# Patient Record
Sex: MALE | Race: WHITE | NOT HISPANIC OR LATINO | Employment: FULL TIME | URBAN - METROPOLITAN AREA
[De-identification: names, ages, dates, MRNs, and addresses within clinical notes are randomized per-mention and may not be internally consistent; named-entity substitution may affect disease eponyms.]

---

## 2023-06-03 DIAGNOSIS — E55.9 VITAMIN D DEFICIENCY: Primary | ICD-10-CM

## 2023-06-08 ENCOUNTER — APPOINTMENT (OUTPATIENT)
Dept: PRIMARY CARE | Facility: CLINIC | Age: 53
End: 2023-06-08
Payer: COMMERCIAL

## 2023-06-08 ENCOUNTER — TELEPHONE (OUTPATIENT)
Dept: PRIMARY CARE | Facility: CLINIC | Age: 53
End: 2023-06-08

## 2023-06-08 NOTE — TELEPHONE ENCOUNTER
Patient called to reschedule appointment stated he was admitted into [facility] this morning for his gallbladder. Just wanted to let you know.

## 2023-06-28 ENCOUNTER — APPOINTMENT (OUTPATIENT)
Dept: PRIMARY CARE | Facility: CLINIC | Age: 53
End: 2023-06-28
Payer: COMMERCIAL

## 2023-07-05 ENCOUNTER — OFFICE VISIT (OUTPATIENT)
Dept: PRIMARY CARE | Facility: CLINIC | Age: 53
End: 2023-07-05
Payer: COMMERCIAL

## 2023-07-05 VITALS
BODY MASS INDEX: 33.55 KG/M2 | HEART RATE: 114 BPM | DIASTOLIC BLOOD PRESSURE: 70 MMHG | SYSTOLIC BLOOD PRESSURE: 115 MMHG | WEIGHT: 211 LBS

## 2023-07-05 DIAGNOSIS — E11.43 DIABETIC AUTONOMIC NEUROPATHY ASSOCIATED WITH TYPE 2 DIABETES MELLITUS (MULTI): ICD-10-CM

## 2023-07-05 DIAGNOSIS — E29.1 HYPOGONADISM IN MALE: ICD-10-CM

## 2023-07-05 DIAGNOSIS — E11.69 TYPE 2 DIABETES MELLITUS WITH OTHER SPECIFIED COMPLICATION, WITHOUT LONG-TERM CURRENT USE OF INSULIN (MULTI): Primary | ICD-10-CM

## 2023-07-05 DIAGNOSIS — Z95.1 S/P CABG (CORONARY ARTERY BYPASS GRAFT): ICD-10-CM

## 2023-07-05 DIAGNOSIS — I50.42 CHRONIC COMBINED SYSTOLIC AND DIASTOLIC CONGESTIVE HEART FAILURE (MULTI): ICD-10-CM

## 2023-07-05 DIAGNOSIS — I10 BENIGN HYPERTENSION: ICD-10-CM

## 2023-07-05 DIAGNOSIS — Z12.11 SCREENING FOR MALIGNANT NEOPLASM OF COLON: ICD-10-CM

## 2023-07-05 DIAGNOSIS — I25.810 CORONARY ARTERY DISEASE INVOLVING AUTOLOGOUS VEIN CORONARY BYPASS GRAFT WITHOUT ANGINA PECTORIS: ICD-10-CM

## 2023-07-05 DIAGNOSIS — K21.00 GASTROESOPHAGEAL REFLUX DISEASE WITH ESOPHAGITIS WITHOUT HEMORRHAGE: ICD-10-CM

## 2023-07-05 PROBLEM — E55.9 VITAMIN D DEFICIENCY: Status: ACTIVE | Noted: 2023-07-05

## 2023-07-05 PROBLEM — I42.9 CARDIOMYOPATHY (MULTI): Status: ACTIVE | Noted: 2023-07-05

## 2023-07-05 PROBLEM — E04.9 GOITER: Status: ACTIVE | Noted: 2023-07-05

## 2023-07-05 PROBLEM — G47.19 EXCESSIVE DAYTIME SLEEPINESS: Status: ACTIVE | Noted: 2023-07-05

## 2023-07-05 PROBLEM — I44.7 LBBB (LEFT BUNDLE BRANCH BLOCK): Status: ACTIVE | Noted: 2023-07-05

## 2023-07-05 PROBLEM — E11.9 DIABETES MELLITUS (MULTI): Status: RESOLVED | Noted: 2023-07-05 | Resolved: 2023-07-05

## 2023-07-05 PROBLEM — R91.1 LUNG NODULE: Status: ACTIVE | Noted: 2023-07-05

## 2023-07-05 PROBLEM — N52.9 ERECTILE DYSFUNCTION: Status: ACTIVE | Noted: 2023-07-05

## 2023-07-05 PROBLEM — M17.12 PRIMARY OSTEOARTHRITIS OF LEFT KNEE: Status: ACTIVE | Noted: 2023-07-05

## 2023-07-05 PROBLEM — E11.9 DIABETES MELLITUS (MULTI): Status: ACTIVE | Noted: 2023-07-05

## 2023-07-05 PROCEDURE — 99212 OFFICE O/P EST SF 10 MIN: CPT | Performed by: NURSE PRACTITIONER

## 2023-07-05 PROCEDURE — 1036F TOBACCO NON-USER: CPT | Performed by: NURSE PRACTITIONER

## 2023-07-05 PROCEDURE — 3074F SYST BP LT 130 MM HG: CPT | Performed by: NURSE PRACTITIONER

## 2023-07-05 PROCEDURE — 3078F DIAST BP <80 MM HG: CPT | Performed by: NURSE PRACTITIONER

## 2023-07-05 PROCEDURE — 4010F ACE/ARB THERAPY RXD/TAKEN: CPT | Performed by: NURSE PRACTITIONER

## 2023-07-05 RX ORDER — EMPAGLIFLOZIN 25 MG/1
1 TABLET, FILM COATED ORAL DAILY
COMMUNITY
Start: 2020-11-10

## 2023-07-05 RX ORDER — ATORVASTATIN CALCIUM 40 MG/1
1 TABLET, FILM COATED ORAL DAILY
COMMUNITY
Start: 2017-12-22 | End: 2023-11-07 | Stop reason: SDUPTHER

## 2023-07-05 RX ORDER — ASPIRIN 81 MG/1
1 TABLET ORAL DAILY
COMMUNITY
Start: 2018-01-09

## 2023-07-05 RX ORDER — EZETIMIBE 10 MG/1
TABLET ORAL
COMMUNITY
Start: 2023-07-04

## 2023-07-05 RX ORDER — OMEPRAZOLE 20 MG/1
CAPSULE, DELAYED RELEASE ORAL
COMMUNITY
Start: 2020-11-10

## 2023-07-05 RX ORDER — LISINOPRIL 20 MG/1
1 TABLET ORAL DAILY
COMMUNITY
Start: 2018-01-09 | End: 2023-12-12

## 2023-07-05 RX ORDER — DULAGLUTIDE 3 MG/.5ML
INJECTION, SOLUTION SUBCUTANEOUS
COMMUNITY
Start: 2023-06-13 | End: 2024-04-19

## 2023-07-05 RX ORDER — CLOPIDOGREL BISULFATE 75 MG/1
1 TABLET ORAL DAILY
COMMUNITY
Start: 2018-01-09 | End: 2023-12-12

## 2023-07-05 RX ORDER — METFORMIN HYDROCHLORIDE 500 MG/1
2 TABLET ORAL
COMMUNITY
Start: 2017-11-28 | End: 2023-08-31 | Stop reason: SDUPTHER

## 2023-07-05 RX ORDER — ISOSORBIDE MONONITRATE 30 MG/1
1 TABLET, EXTENDED RELEASE ORAL NIGHTLY
COMMUNITY
Start: 2020-03-10 | End: 2023-12-12

## 2023-07-05 NOTE — PATIENT INSTRUCTIONS
Thank you for coming in to see me today. It was a pleasure to see you again!     #CAD s/p CABG  c/w Plavix, ASA, statin, Isosorbide  f/u cardiology     #HTN  c/w lisinopril 20 mg   today BP= 115/70     #HLD  c/w Atorvastatin  exercise regularly     #T2DM/HYPOGONADISM   Tx per Endocrinology   c/w Trulicity, Jardiance, Metformin     #GERD  c/w Prilosec OTC    Cologuard due in Dec 2023     RTC 6 MONTHS AND AS NEEDED

## 2023-07-05 NOTE — PROGRESS NOTES
"Subjective   Chief Complaint   Patient presents with    Follow-up     ARNALDO IS HERE TODAY FOR ROUTINE 6 MONTH F/U.        Patient ID: Arnaldo Balderrama is a 52 y.o. male who presents for Follow-up (ARNALDO IS HERE TODAY FOR ROUTINE 6 MONTH F/U. ).    HPI  Est 53 yo male presents today for f/u    PMH: CAD s/p CABG X 4 (2017), HTN, HLD,T2DM, GERD  works FT as maintenance, , children, social ETOH, nonsmoker    Pt had gallbladder removed in June 2023 per Dr. Lemons     #HYPOGONADISM   seeing Dr. Barr, urology  started testosterone injections ---> Testosterone= 249 Sept 2022  Testosterone d/c'd in June 2022 per Dr. Montiel \"level was normal\"     Pt had CT chest done in May for RUL nodule  no change @ 11 mm   recommend repeat CT; was due in Nov 2022       #CAD s/p CABG  cardiologist Dr. Schmidt/Juliano; LOV Nov 2022  stable on Plavix, ASA, statin, Isosorbide  had heart cath in Feb 2020---> clear     #HTN  Rx lisinopril 20 mg daily  stable today 115/70  NONSMOKER      #HLD  Rx Atorvastatin  FLP-----> 151/94/120 Sept 2022     #T2DM  Dx 2002  sees Dr. Augustin; LOV June 2023   Rx Trulicity, Jardiance, Metformin  6.8 % in Oct 2022  last eye exam: 2020  neuropathy: b/l feet, does not take medication      #HYPOTHYROIDISM  Off levothyroxine since Aug 2020  TSH = 2.64 June 2022     #GERD  Taking Prilosec OTC  sx controlled      #HM  COLON: cologuard due 2023  PSA: 2022    Review of Systems  All 13 systems were reviewed and are within normal limits except positive and pertinent negative responses which are noted below or in HPI.      Objective   /70   Pulse (!) 114   Wt 95.7 kg (211 lb)   BMI 33.55 kg/m²        Physical Exam  Vitals reviewed.   Cardiovascular:      Pulses: Normal pulses.   Pulmonary:      Effort: Pulmonary effort is normal.   Abdominal:      General: Bowel sounds are normal.   Skin:     General: Skin is warm and dry.      Capillary Refill: Capillary refill takes less than 2 seconds. "   Neurological:      Mental Status: He is alert.   Psychiatric:         Mood and Affect: Mood normal.         Assessment/Plan   Problem List Items Addressed This Visit       Chronic combined systolic and diastolic congestive heart failure (CMS/HCC)    Relevant Medications    clopidogrel (Plavix) 75 mg tablet    isosorbide mononitrate ER (Imdur) 30 mg 24 hr tablet    Hypogonadism in male    Benign hypertension    CAD (coronary artery disease), autologous vein bypass graft    Relevant Medications    clopidogrel (Plavix) 75 mg tablet    isosorbide mononitrate ER (Imdur) 30 mg 24 hr tablet    RESOLVED: Diabetes mellitus (CMS/HCC) - Primary    RESOLVED: Diabetic autonomic neuropathy associated with type 2 diabetes mellitus (CMS/HCC)    Gastroesophageal reflux disease with esophagitis    S/P CABG (coronary artery bypass graft)     Other Visit Diagnoses       Screening for malignant neoplasm of colon        Relevant Orders    Cologuard® colon cancer screening

## 2023-08-31 DIAGNOSIS — E11.69 DIABETES MELLITUS TYPE 2 IN OBESE: ICD-10-CM

## 2023-08-31 DIAGNOSIS — E66.9 DIABETES MELLITUS TYPE 2 IN OBESE: ICD-10-CM

## 2023-08-31 RX ORDER — METFORMIN HYDROCHLORIDE 500 MG/1
1000 TABLET ORAL
Qty: 360 TABLET | Refills: 0 | Status: SHIPPED | OUTPATIENT
Start: 2023-08-31 | End: 2023-12-11

## 2023-09-03 PROBLEM — E11.40 DIABETIC NEUROPATHY, PAINFUL (MULTI): Status: ACTIVE | Noted: 2023-09-03

## 2023-09-03 PROBLEM — Z79.4 LONG TERM (CURRENT) USE OF INSULIN (MULTI): Status: ACTIVE | Noted: 2023-09-03

## 2023-09-03 PROBLEM — I25.2 HISTORY OF MYOCARDIAL INFARCT AT AGE LESS THAN 60 YEARS: Status: ACTIVE | Noted: 2023-09-03

## 2023-09-03 PROBLEM — D68.9 COAGULOPATHY (MULTI): Status: ACTIVE | Noted: 2023-09-03

## 2023-09-03 PROBLEM — R13.10 DYSPHAGIA: Status: ACTIVE | Noted: 2023-09-03

## 2023-09-03 PROBLEM — I25.10 TRIPLE VESSEL DISEASE OF THE HEART: Status: ACTIVE | Noted: 2023-09-03

## 2023-09-03 PROBLEM — K81.0 ACUTE CHOLECYSTITIS: Status: ACTIVE | Noted: 2023-09-03

## 2023-09-03 PROBLEM — E78.5 DYSLIPIDEMIA: Status: ACTIVE | Noted: 2023-09-03

## 2023-09-03 PROBLEM — E66.8 OTHER OBESITY: Status: ACTIVE | Noted: 2023-09-03

## 2023-09-03 PROBLEM — E11.42 TYPE 2 DIABETES MELLITUS WITH DIABETIC POLYNEUROPATHY, WITHOUT LONG-TERM CURRENT USE OF INSULIN (MULTI): Status: ACTIVE | Noted: 2023-07-05

## 2023-09-03 PROBLEM — I25.10 ATHEROSCLEROSIS OF NATIVE CORONARY ARTERY OF NATIVE HEART WITHOUT ANGINA PECTORIS: Status: ACTIVE | Noted: 2023-09-03

## 2023-09-03 PROBLEM — I49.8 BIGEMINY: Status: ACTIVE | Noted: 2023-09-03

## 2023-09-03 PROBLEM — M1A.9XX0 CHRONIC GOUT WITHOUT TOPHUS: Status: ACTIVE | Noted: 2023-09-03

## 2023-09-03 PROBLEM — Z90.49 HX LAPAROSCOPIC CHOLECYSTECTOMY: Status: ACTIVE | Noted: 2023-09-03

## 2023-09-03 PROBLEM — E87.70 HYPERVOLEMIA: Status: ACTIVE | Noted: 2023-09-03

## 2023-09-03 PROBLEM — R73.9 HYPERGLYCEMIA: Status: ACTIVE | Noted: 2023-09-03

## 2023-09-03 PROBLEM — R57.0 CARDIOGENIC SHOCK (MULTI): Status: ACTIVE | Noted: 2023-09-03

## 2023-09-03 PROBLEM — I25.810 ATHEROSCLEROSIS OF AUTOLOGOUS VEIN CORONARY ARTERY BYPASS GRAFT: Status: ACTIVE | Noted: 2018-01-30

## 2023-09-03 PROBLEM — D62 ACUTE BLOOD LOSS AS CAUSE OF POSTOPERATIVE ANEMIA: Status: ACTIVE | Noted: 2023-09-03

## 2023-09-03 PROBLEM — I16.0 HYPERTENSIVE URGENCY: Status: ACTIVE | Noted: 2023-09-03

## 2023-09-03 PROBLEM — E66.89 OTHER OBESITY: Status: ACTIVE | Noted: 2023-09-03

## 2023-09-03 RX ORDER — ATORVASTATIN CALCIUM 20 MG/1
20 TABLET, FILM COATED ORAL DAILY
COMMUNITY
End: 2023-11-07

## 2023-09-03 RX ORDER — DULAGLUTIDE 1.5 MG/.5ML
INJECTION, SOLUTION SUBCUTANEOUS
COMMUNITY
End: 2023-11-07

## 2023-09-03 RX ORDER — METFORMIN HYDROCHLORIDE 500 MG/1
1000 TABLET, EXTENDED RELEASE ORAL 2 TIMES DAILY
COMMUNITY
End: 2023-11-07

## 2023-11-07 ENCOUNTER — OFFICE VISIT (OUTPATIENT)
Dept: CARDIOLOGY | Facility: CLINIC | Age: 53
End: 2023-11-07
Payer: COMMERCIAL

## 2023-11-07 VITALS
DIASTOLIC BLOOD PRESSURE: 80 MMHG | HEART RATE: 93 BPM | SYSTOLIC BLOOD PRESSURE: 133 MMHG | OXYGEN SATURATION: 97 % | WEIGHT: 200.7 LBS | BODY MASS INDEX: 31.43 KG/M2

## 2023-11-07 DIAGNOSIS — I25.10 CORONARY ARTERY DISEASE INVOLVING NATIVE HEART WITHOUT ANGINA PECTORIS, UNSPECIFIED VESSEL OR LESION TYPE: ICD-10-CM

## 2023-11-07 DIAGNOSIS — I10 ESSENTIAL HYPERTENSION, BENIGN: Primary | ICD-10-CM

## 2023-11-07 DIAGNOSIS — E78.5 HYPERLIPIDEMIA, UNSPECIFIED HYPERLIPIDEMIA TYPE: ICD-10-CM

## 2023-11-07 PROCEDURE — 3075F SYST BP GE 130 - 139MM HG: CPT | Performed by: NURSE PRACTITIONER

## 2023-11-07 PROCEDURE — 93005 ELECTROCARDIOGRAM TRACING: CPT | Performed by: NURSE PRACTITIONER

## 2023-11-07 PROCEDURE — 1036F TOBACCO NON-USER: CPT | Performed by: NURSE PRACTITIONER

## 2023-11-07 PROCEDURE — 99213 OFFICE O/P EST LOW 20 MIN: CPT | Performed by: NURSE PRACTITIONER

## 2023-11-07 PROCEDURE — 3051F HG A1C>EQUAL 7.0%<8.0%: CPT | Performed by: NURSE PRACTITIONER

## 2023-11-07 PROCEDURE — 99213 OFFICE O/P EST LOW 20 MIN: CPT | Mod: 25 | Performed by: NURSE PRACTITIONER

## 2023-11-07 PROCEDURE — 4010F ACE/ARB THERAPY RXD/TAKEN: CPT | Performed by: NURSE PRACTITIONER

## 2023-11-07 PROCEDURE — 93010 ELECTROCARDIOGRAM REPORT: CPT | Performed by: INTERNAL MEDICINE

## 2023-11-07 PROCEDURE — 3079F DIAST BP 80-89 MM HG: CPT | Performed by: NURSE PRACTITIONER

## 2023-11-07 RX ORDER — ACETAMINOPHEN 500 MG
500 TABLET ORAL EVERY 4 HOURS PRN
COMMUNITY
Start: 2023-06-10 | End: 2024-05-01 | Stop reason: WASHOUT

## 2023-11-07 RX ORDER — IBUPROFEN 600 MG/1
600 TABLET ORAL EVERY 6 HOURS PRN
COMMUNITY
Start: 2023-06-10 | End: 2024-05-01 | Stop reason: WASHOUT

## 2023-11-07 ASSESSMENT — ENCOUNTER SYMPTOMS
EYES NEGATIVE: 1
HEMATOLOGIC/LYMPHATIC NEGATIVE: 1
CARDIOVASCULAR NEGATIVE: 1
MYALGIAS: 1
ENDOCRINE NEGATIVE: 1
RESPIRATORY NEGATIVE: 1
CONSTITUTIONAL NEGATIVE: 1
PSYCHIATRIC NEGATIVE: 1
GASTROINTESTINAL NEGATIVE: 1
NEUROLOGICAL NEGATIVE: 1
DEPRESSION: 0

## 2023-11-07 NOTE — PROGRESS NOTES
Referred by Dr. Margo lewis. provider found for Follow-up (1 yr.)     History Of Present Illness:    Arnaldo Balderrama is a very pleasant 53 year old gentleman with a history of CAD s/p CABG x4, DM, HTN and HLD, he is here for a follow up visit. The patient is seen in collaboration with Dr. Schmidt. Mr. Balderrama has occasional right sided chest pain, occurs more in the morning and gets better through out the day. The pain is brief in nature when it occurs. Denies shortness of breath or heart palpitations. Continues to stay active on a regular basis without limitations.       Review of Systems   Constitutional: Negative.   HENT: Negative.     Eyes: Negative.    Cardiovascular: Negative.    Respiratory: Negative.     Endocrine: Negative.    Hematologic/Lymphatic: Negative.    Skin: Negative.    Musculoskeletal:  Positive for muscle weakness and myalgias.   Gastrointestinal: Negative.    Neurological: Negative.    Psychiatric/Behavioral: Negative.        Past Medical History:  He has a past medical history of Hypothyroidism, unspecified (12/02/2020), Other fatigue (12/02/2020), Personal history of other diseases of the musculoskeletal system and connective tissue (09/24/2019), and Personal history of other specified conditions (10/09/2018).    Past Surgical History:  He has a past surgical history that includes Coronary artery bypass graft (01/16/2018) and Other surgical history (12/08/2022).      Social History:  He reports that he has never smoked. He has never been exposed to tobacco smoke. He has never used smokeless tobacco. He reports current alcohol use. He reports that he does not use drugs.    Family History:  Family History   Problem Relation Name Age of Onset    Thyroid disease Mother      Diabetes Father      Heart disease Father      Thyroid disease Father      Hypertension Sibling          Allergies:  Patient has no known allergies.    Outpatient Medications:  Current Outpatient Medications   Medication  Instructions    acetaminophen (TYLENOL) 500 mg, oral, Every 4 hours PRN    aspirin 81 mg EC tablet 1 tablet, oral, Daily    cholecalciferol (Vitamin D3) 50 mcg (2,000 unit) capsule TAKE ONE CAPSULE BY MOUTH DAILY    clopidogrel (Plavix) 75 mg tablet 1 tablet, oral, Daily    ezetimibe (Zetia) 10 mg tablet     ibuprofen 600 mg, oral, Every 6 hours PRN    isosorbide mononitrate ER (Imdur) 30 mg 24 hr tablet 1 tablet, oral, Nightly    Jardiance 25 mg 1 tablet, oral, Daily    lisinopril 20 mg tablet 1 tablet, oral, Daily    metFORMIN (GLUCOPHAGE) 1,000 mg, oral, 2 times daily with meals    omeprazole (PriLOSEC) 20 mg DR capsule oral    polyethylene glycol (Glycolax, Miralax) 17 gram/dose powder MIX 17 GRAMS INTO 4-8 OUNCES OF WATER OR JUICE AND TAKE BY MOUTH ONCE A DAY    Trulicity 3 mg/0.5 mL pen injector USE AS DIRECTED. INJECT 3mg SUBCUTANEOUSLY WEEKLY        Last Recorded Vitals:  Vitals:    11/07/23 1527   BP: 133/80   Pulse: 93   SpO2: 97%   Weight: 91 kg (200 lb 11.2 oz)       Physical Exam:  Physical Exam  Vitals reviewed.   HENT:      Head: Normocephalic.      Nose: Nose normal.   Eyes:      Pupils: Pupils are equal, round, and reactive to light.   Cardiovascular:      Rate and Rhythm: Normal rate and regular rhythm.   Pulmonary:      Effort: Pulmonary effort is normal.      Breath sounds: Normal breath sounds.   Abdominal:      General: Abdomen is flat.      Palpations: Abdomen is soft.   Musculoskeletal:         General: Normal range of motion.      Cervical back: Normal range of motion.   Skin:     General: Skin is warm and dry.   Neurological:      General: No focal deficit present.      Mental Status: He is alert and oriented to person, place, and time.   Psychiatric:         Mood and Affect: Mood normal.            Last Labs:  CBC -  Lab Results   Component Value Date    WBC 9.1 06/12/2023    HGB 15.7 06/12/2023    HCT 47.4 06/12/2023    MCV 87.8 06/12/2023     06/12/2023       CMP -  Lab Results    Component Value Date    CALCIUM 9.6 06/12/2023    PHOS 4.7 12/22/2017    PROT 7.0 06/12/2023    ALBUMIN 3.9 06/12/2023    AST 34 06/12/2023    ALT 54 (H) 06/12/2023    ALKPHOS 77 06/12/2023    BILITOT 0.3 06/12/2023       LIPID PANEL -   Lab Results   Component Value Date    CHOL 113 (L) 06/12/2023    TRIG 124 06/12/2023    HDL 29 (L) 06/12/2023    CHHDL 3.9 06/12/2023    LDLF 94 09/24/2022    VLDL 24 09/24/2022    NHDL 156 03/08/2018       RENAL FUNCTION PANEL -   Lab Results   Component Value Date    GLUCOSE 132 (H) 06/12/2023     06/12/2023    K 4.5 06/12/2023     06/12/2023    CO2 22 (L) 06/12/2023    ANIONGAP 16 06/12/2023    BUN 10 06/12/2023    CREATININE 0.8 06/12/2023    GFRMALE >90 06/18/2022    CALCIUM 9.6 06/12/2023    PHOS 4.7 12/22/2017    ALBUMIN 3.9 06/12/2023        Lab Results   Component Value Date     (H) 12/11/2017    HGBA1C 7.8 (H) 06/08/2023       Last Cardiology Tests:  ECG:  EKG independently reviewed from today showed sinus rhythm heart rate 89 bpm     Echo:  Echocardiogram 4/7/2022  1. The left ventricular systolic function is normal with a 55-60% estimated ejection fraction.  2. There is trace mitral and tricuspid regurgitation.    Echo 1/19/18:   1. The left ventricular systolic function is low normal with a 50-55% estimated  ejection fraction.   2. Abnormal septal motion consistent with post-operative status.   3. Spectral Doppler shows an impaired relaxation pattern of left ventricular  diastolic filling.   4. There is mild mitral and tricuspid regurgitation.    Echo 12/14/17:   1. The left ventricular systolic function is severely decreased with a 25-30%  estimated ejection fraction.   2. Poorly visualized anatomical structures due to suboptimal image quality.   3. Spectral Doppler shows a restrictive pattern of left ventricular diastolic  filling.   4. RVSP within normal limits.  Ejection Fractions:  LVEF 55-60%    Cath:  Cath 2/21/2020:  1. Left main: 30% distal  stenosis.  2. LAD: 95% diffuse prox-mid disease.  3. LCx: 90% diffuse prox-mid disease.  4. Ramus 80% proximal disease.  5. RCA: 80% diffuse prox-mid disease.  6. 4/4 patent grafts: (1) LIMA to LAD (2) sequential SVG to OM2-D1 (3) SVG to  rPDA.  7. LVEDP 9mmHg, no aortic stenosis on LV-Ao gradient.    Stress Test:    Cardiac Imaging:      Assessment/Plan   Very pleasant 53-year-old gentleman with hypertension, diabetes, dyslipidemia, and coronary artery disease status post 4 vessel CABG in December 2017 (LIMA to LAD, SVG to PDA, SVG to OM, SVG to Diag attached proximally to zaman of OM graft). Underwent LHC in 2/2020 that showed 4/4 patent grafts. He continues to do well from a cardiac standpoint. He has occasional atypical chest pain. He was given reassurance. He continues to stay active without limitations. Echocardiogram in April 2022 showed a preserved LV function. Heart rate and blood pressure are well controlled today.      Plan   -call with any questions   -continue Aspirin indefinitely and Plavix   -continue Atorvastatin, Imdur and Lisinopril   -follow up in one year       PHOENIX Durán-CNP

## 2023-11-13 LAB
ATRIAL RATE: 89 BPM
P AXIS: 14 DEGREES
P OFFSET: 204 MS
P ONSET: 150 MS
PR INTERVAL: 146 MS
Q ONSET: 223 MS
QRS COUNT: 14 BEATS
QRS DURATION: 86 MS
QT INTERVAL: 352 MS
QTC CALCULATION(BAZETT): 428 MS
QTC FREDERICIA: 401 MS
R AXIS: 27 DEGREES
T AXIS: 155 DEGREES
T OFFSET: 399 MS
VENTRICULAR RATE: 89 BPM

## 2023-12-11 DIAGNOSIS — Z95.1 S/P CABG (CORONARY ARTERY BYPASS GRAFT): ICD-10-CM

## 2023-12-11 DIAGNOSIS — E11.69 DIABETES MELLITUS TYPE 2 IN OBESE: ICD-10-CM

## 2023-12-11 DIAGNOSIS — E66.9 DIABETES MELLITUS TYPE 2 IN OBESE: ICD-10-CM

## 2023-12-11 DIAGNOSIS — I25.2 HISTORY OF MYOCARDIAL INFARCT AT AGE LESS THAN 60 YEARS: ICD-10-CM

## 2023-12-11 DIAGNOSIS — I10 ESSENTIAL HYPERTENSION, BENIGN: ICD-10-CM

## 2023-12-11 RX ORDER — METFORMIN HYDROCHLORIDE 500 MG/1
TABLET ORAL
Qty: 360 TABLET | Refills: 0 | Status: SHIPPED | OUTPATIENT
Start: 2023-12-11 | End: 2024-03-21

## 2023-12-12 RX ORDER — LISINOPRIL 20 MG/1
20 TABLET ORAL DAILY
Qty: 90 TABLET | Refills: 3 | Status: SHIPPED | OUTPATIENT
Start: 2023-12-12

## 2023-12-12 RX ORDER — CLOPIDOGREL BISULFATE 75 MG/1
75 TABLET ORAL DAILY
Qty: 90 TABLET | Refills: 3 | Status: SHIPPED | OUTPATIENT
Start: 2023-12-12

## 2023-12-12 RX ORDER — ISOSORBIDE MONONITRATE 30 MG/1
30 TABLET, EXTENDED RELEASE ORAL NIGHTLY
Qty: 90 TABLET | Refills: 3 | Status: SHIPPED | OUTPATIENT
Start: 2023-12-12

## 2023-12-13 ENCOUNTER — ANCILLARY PROCEDURE (OUTPATIENT)
Dept: RADIOLOGY | Facility: CLINIC | Age: 53
End: 2023-12-13
Payer: COMMERCIAL

## 2023-12-13 DIAGNOSIS — R91.1 SOLITARY PULMONARY NODULE: ICD-10-CM

## 2023-12-13 PROCEDURE — 71250 CT THORAX DX C-: CPT

## 2023-12-13 PROCEDURE — 71250 CT THORAX DX C-: CPT | Performed by: RADIOLOGY

## 2023-12-21 ENCOUNTER — OFFICE VISIT (OUTPATIENT)
Dept: ENDOCRINOLOGY | Facility: CLINIC | Age: 53
End: 2023-12-21
Payer: COMMERCIAL

## 2023-12-21 VITALS
DIASTOLIC BLOOD PRESSURE: 79 MMHG | WEIGHT: 205 LBS | SYSTOLIC BLOOD PRESSURE: 153 MMHG | HEART RATE: 96 BPM | BODY MASS INDEX: 32.11 KG/M2

## 2023-12-21 DIAGNOSIS — E78.5 DYSLIPIDEMIA: ICD-10-CM

## 2023-12-21 DIAGNOSIS — E53.8 VITAMIN B12 DEFICIENCY: ICD-10-CM

## 2023-12-21 DIAGNOSIS — I10 ESSENTIAL HYPERTENSION, BENIGN: ICD-10-CM

## 2023-12-21 DIAGNOSIS — R53.83 OTHER FATIGUE: ICD-10-CM

## 2023-12-21 DIAGNOSIS — E11.42 TYPE 2 DIABETES MELLITUS WITH DIABETIC POLYNEUROPATHY, WITHOUT LONG-TERM CURRENT USE OF INSULIN (MULTI): Primary | ICD-10-CM

## 2023-12-21 LAB — POC HEMOGLOBIN A1C: 7.2 % (ref 4.2–6.5)

## 2023-12-21 PROCEDURE — 83036 HEMOGLOBIN GLYCOSYLATED A1C: CPT | Performed by: NURSE PRACTITIONER

## 2023-12-21 PROCEDURE — 99213 OFFICE O/P EST LOW 20 MIN: CPT | Performed by: NURSE PRACTITIONER

## 2023-12-21 PROCEDURE — 3077F SYST BP >= 140 MM HG: CPT | Performed by: NURSE PRACTITIONER

## 2023-12-21 PROCEDURE — 3051F HG A1C>EQUAL 7.0%<8.0%: CPT | Performed by: NURSE PRACTITIONER

## 2023-12-21 PROCEDURE — 1036F TOBACCO NON-USER: CPT | Performed by: NURSE PRACTITIONER

## 2023-12-21 PROCEDURE — 3078F DIAST BP <80 MM HG: CPT | Performed by: NURSE PRACTITIONER

## 2023-12-21 PROCEDURE — 4010F ACE/ARB THERAPY RXD/TAKEN: CPT | Performed by: NURSE PRACTITIONER

## 2023-12-21 RX ORDER — ATORVASTATIN CALCIUM 40 MG/1
40 TABLET, FILM COATED ORAL DAILY
COMMUNITY
Start: 2023-12-02 | End: 2024-04-15

## 2023-12-21 ASSESSMENT — PAIN SCALES - GENERAL: PAINLEVEL: 0-NO PAIN

## 2023-12-21 ASSESSMENT — ENCOUNTER SYMPTOMS: DEPRESSION: 0

## 2023-12-21 NOTE — PROGRESS NOTES
HPI:   54yo of Dr. Montiel practice with Diabetes 2 (dx in his 30's), HTN, Dyslipidemia, CVD s/p cabg , gout, presents for followup. A1c 7.2% was 7.8%. Pt is testing sugars <1 times per day. Pt is having low sugars 0 times/week. Pt's typical blood sugars are running 130's on waking, infrequent tests in the evening . Pt is following a carb controlled diet and knows reasonable carb allowances. Pt is able to afford their medications. Pt is exercising.         Taking metformin 500mger (4), jardiance 25mg, trulicity 3 mg (ozempic was working but not covered in 2023)         Taking atorvastatin 40mg, zetia 10mg tolerating LDL 59         Taking lisinopril 20mg, imdur 30mg for htn/cvd.    /79 (BP Location: Left arm, Patient Position: Sitting)   Pulse 96   Wt 93 kg (205 lb)   BMI 32.11 kg/m²     Labs:  Lab Results   Component Value Date    WBC 9.1 06/12/2023    NRBC 0 06/12/2023    RBC 5.40 06/12/2023    HGB 15.7 06/12/2023    HCT 47.4 06/12/2023     06/12/2023     Lab Results   Component Value Date    CALCIUM 9.6 06/12/2023    AST 34 06/12/2023    ALKPHOS 77 06/12/2023    BILITOT 0.3 06/12/2023    PROT 7.0 06/12/2023    ALBUMIN 3.9 06/12/2023    GLOB 3.1 06/12/2023    AGR 1.3 (L) 06/12/2023     06/12/2023    K 4.5 06/12/2023     06/12/2023    CO2 22 (L) 06/12/2023    ANIONGAP 16 06/12/2023    BUN 10 06/12/2023    CREATININE 0.8 06/12/2023    UREACREAUR 12.5 06/12/2023    GLUCOSE 132 (H) 06/12/2023    ALT 54 (H) 06/12/2023    EGFR 107 06/12/2023     Lab Results   Component Value Date    CHOL 113 (L) 06/12/2023    TRIG 124 06/12/2023    HDL 29 (L) 06/12/2023    LDLCALC 59 (L) 06/12/2023       Lab Results   Component Value Date    TSH 2.64 06/18/2022     Lab Results   Component Value Date    [id number] 902 12/05/2020     Lab Results   Component Value Date    HGBA1C 7.2 (A) 12/21/2023         Current Outpatient Medications:     atorvastatin (Lipitor) 40 mg tablet, Take 1 tablet (40 mg) by mouth once  daily., Disp: , Rfl:     acetaminophen (Tylenol) 500 mg tablet, Take 1 tablet (500 mg) by mouth every 4 hours if needed for mild pain (1 - 3)., Disp: , Rfl:     aspirin 81 mg EC tablet, Take 1 tablet (81 mg) by mouth once daily., Disp: , Rfl:     cholecalciferol (Vitamin D3) 50 mcg (2,000 unit) capsule, TAKE ONE CAPSULE BY MOUTH DAILY, Disp: 90 capsule, Rfl: 3    clopidogrel (Plavix) 75 mg tablet, TAKE ONE TABLET BY MOUTH DAILY, Disp: 90 tablet, Rfl: 3    ezetimibe (Zetia) 10 mg tablet, , Disp: , Rfl:     ibuprofen 600 mg tablet, Take 1 tablet (600 mg) by mouth every 6 hours if needed for mild pain (1 - 3)., Disp: , Rfl:     isosorbide mononitrate ER (Imdur) 30 mg 24 hr tablet, TAKE ONE TABLET BY MOUTH DAILY AT BEDTIME, Disp: 90 tablet, Rfl: 3    Jardiance 25 mg, Take 1 tablet (25 mg) by mouth once daily., Disp: , Rfl:     lisinopril 20 mg tablet, TAKE ONE TABLET BY MOUTH EVERY DAY, Disp: 90 tablet, Rfl: 3    metFORMIN (Glucophage) 500 mg tablet, TAKE TWO TABLETS (1000 MG) BY MOUTH TWO TIMES A DAY WITH MEALS, Disp: 360 tablet, Rfl: 0    omeprazole (PriLOSEC) 20 mg DR capsule, Take by mouth., Disp: , Rfl:     polyethylene glycol (Glycolax, Miralax) 17 gram/dose powder, MIX 17 GRAMS INTO 4-8 OUNCES OF WATER OR JUICE AND TAKE BY MOUTH ONCE A DAY, Disp: 510 g, Rfl: 1    Trulicity 3 mg/0.5 mL pen injector, USE AS DIRECTED. INJECT 3mg SUBCUTANEOUSLY WEEKLY, Disp: , Rfl:     Review of Systems:  Cardiology: Lightheadedness-denies.  Chest pain-denies.  Leg edema-denies.  Palpitations-denies.  Respiratory: Cough-denies. Shortness of breath-denies.  Wheezing-denies.  Gastroenterology: Constipation-denies.  Diarrhea-denies.  Heartburn-denies.  Endocrinology: Cold intolerance-denies.  Heat intolerance-denies.  Sweats-denies.  Neurology: Headache-denies.  Tremor-denies.  Neuropathy in extremities-denies.  Psychology: Low energy-denies.  Irritability-denies.  Sleep disturbances-denies.    Physical Exam:  General Appearance:  pleasant, cooperative, no acute distress  HEENT: no chemosis, no proptosis, no lid lag, no lid retraction  Neck: no lymphadenopathy, no thyromegaly, no dominant thyroid nodules  Heart: no murmurs, regular rate and rhythm, S1 and S2  Lungs: no wheezes, no rhonci, no rales  Extremities: no lower extremity swelling    Assessment and Plan:  1. Type 2 diabetes mellitus with diabetic polyneuropathy, without long-term current use of insulin (CMS/[facility])  -no changes in regimen  -reviewed allowable carbs per meal/snack working 2 jobs makes it challenging  -consider Ozempic 2024 if affordable    - POCT glycosylated hemoglobin (Hb A1C) manually resulted    2. Dyslipidemia  -LDL target < 55  -labs ordered    3. Essential hypertension, benign  -stable     Follow Up: 6 months    -labs/tests/notes reviewed  -reviewed and counseled patient on medication monitoring and side effects

## 2023-12-26 NOTE — PROGRESS NOTES
"Subjective   Chief Complaint   Patient presents with    Follow-up     Arnaldo is here today for routine 6 month F/U        Patient ID: Arnaldo Balderrama is a 53 y.o. male who presents for Follow-up (Arnaldo is here today for routine 6 month F/U ).    HPI  Arnaldo is a 52 yo male presenting today for 6 month f/u    PMH: CAD s/p CABG X 4 (2017), HTN, HLD,T2DM, GERD    works FT as , he is , has no children, social ETOH, nonsmoker     Pt had gallbladder removed in June 2023 per Dr. Lemons     #HYPOGONADISM   seeing Dr. Barr, urology  started testosterone injections ---> Testosterone= 249 Sept 2022  Testosterone d/c'd in June 2022 per Dr. Montiel, Endo b/c \"level was normal\"     Pt had CT chest done in May for RUL nodule  no change @ 11 mm   LDCT due Dec 2024     #CAD s/p CABG  cardiologist Dr. Schmidt/Juliano; LOV Nov 2022  stable on Plavix, ASA, statin, Isosorbide  had heart cath in Feb 2020---> clear     #HTN  Rx lisinopril 20 mg daily  stable today 128/80  NONSMOKER      #HLD  Rx Atorvastatin  FLP-----> DUE      #T2DM  Dx 2002  sees Dr. Augustin; LOV Dec 2023   Rx Trulicity, Jardiance, Metformin  7.2%  Dec 2023  last eye exam: 2023 (Pt had cataract surgery this month)  neuropathy: b/l feet, does not take medication      #HYPOTHYROIDISM  Off levothyroxine since Aug 2020  TSH = 2.64 June 2022, ordered      #GERD  Taking Prilosec OTC  sx controlled      #HM  COLON: cologuard due 2023 (pt has kit at home)  PSA: 2022    Review of Systems  All 13 systems were reviewed and are within normal limits except positive and pertinent negative responses which are noted below or in HPI.      Objective   /80   Pulse 88   Ht 1.702 m (5' 7\")   Wt 94.8 kg (209 lb)   BMI 32.73 kg/m²      Current Outpatient Medications   Medication Instructions    acetaminophen (TYLENOL) 500 mg, oral, Every 4 hours PRN    aspirin 81 mg EC tablet 1 tablet, oral, Daily    atorvastatin (LIPITOR) 40 mg, oral, Daily    " cholecalciferol (Vitamin D3) 50 mcg (2,000 unit) capsule TAKE ONE CAPSULE BY MOUTH DAILY    clopidogrel (PLAVIX) 75 mg, oral, Daily    ezetimibe (Zetia) 10 mg tablet     ibuprofen 600 mg, oral, Every 6 hours PRN    isosorbide mononitrate ER (IMDUR) 30 mg, oral, Nightly    Jardiance 25 mg 1 tablet, oral, Daily    lisinopril 20 mg, oral, Daily    metFORMIN (Glucophage) 500 mg tablet TAKE TWO TABLETS (1000 MG) BY MOUTH TWO TIMES A DAY WITH MEALS    omeprazole (PriLOSEC) 20 mg DR capsule oral    polyethylene glycol (Glycolax, Miralax) 17 gram/dose powder MIX 17 GRAMS INTO 4-8 OUNCES OF WATER OR JUICE AND TAKE BY MOUTH ONCE A DAY    Trulicity 3 mg/0.5 mL pen injector USE AS DIRECTED. INJECT 3mg SUBCUTANEOUSLY WEEKLY         Physical Exam  Vitals reviewed.   Cardiovascular:      Pulses: Normal pulses.      Heart sounds: Normal heart sounds.   Pulmonary:      Effort: Pulmonary effort is normal.      Breath sounds: Normal breath sounds.   Skin:     General: Skin is warm and dry.   Neurological:      Mental Status: He is alert.   Psychiatric:         Mood and Affect: Mood normal.         Thought Content: Thought content normal.         Judgment: Judgment normal.           Assessment/Plan   Problem List Items Addressed This Visit             ICD-10-CM    Essential hypertension, benign - Primary I10    Type 2 diabetes mellitus with diabetic polyneuropathy, without long-term current use of insulin (CMS/[facility]) E11.42    Vitamin D deficiency E55.9    Relevant Orders    Vitamin D 25-Hydroxy,Total (for eval of Vitamin D levels)    Dyslipidemia E78.5    Atherosclerosis of native coronary artery of native heart without angina pectoris I25.10

## 2023-12-27 ENCOUNTER — OFFICE VISIT (OUTPATIENT)
Dept: PRIMARY CARE | Facility: CLINIC | Age: 53
End: 2023-12-27
Payer: COMMERCIAL

## 2023-12-27 VITALS
HEART RATE: 88 BPM | BODY MASS INDEX: 32.8 KG/M2 | WEIGHT: 209 LBS | HEIGHT: 67 IN | SYSTOLIC BLOOD PRESSURE: 128 MMHG | DIASTOLIC BLOOD PRESSURE: 80 MMHG

## 2023-12-27 DIAGNOSIS — E11.42 TYPE 2 DIABETES MELLITUS WITH DIABETIC POLYNEUROPATHY, WITHOUT LONG-TERM CURRENT USE OF INSULIN (MULTI): ICD-10-CM

## 2023-12-27 DIAGNOSIS — I10 ESSENTIAL HYPERTENSION, BENIGN: Primary | ICD-10-CM

## 2023-12-27 DIAGNOSIS — I25.10 ATHEROSCLEROSIS OF NATIVE CORONARY ARTERY OF NATIVE HEART WITHOUT ANGINA PECTORIS: ICD-10-CM

## 2023-12-27 DIAGNOSIS — E78.5 DYSLIPIDEMIA: ICD-10-CM

## 2023-12-27 DIAGNOSIS — E55.9 VITAMIN D DEFICIENCY: ICD-10-CM

## 2023-12-27 PROBLEM — I16.0 HYPERTENSIVE URGENCY: Status: RESOLVED | Noted: 2023-09-03 | Resolved: 2023-12-27

## 2023-12-27 PROCEDURE — 3051F HG A1C>EQUAL 7.0%<8.0%: CPT | Performed by: NURSE PRACTITIONER

## 2023-12-27 PROCEDURE — 3074F SYST BP LT 130 MM HG: CPT | Performed by: NURSE PRACTITIONER

## 2023-12-27 PROCEDURE — 99212 OFFICE O/P EST SF 10 MIN: CPT | Performed by: NURSE PRACTITIONER

## 2023-12-27 PROCEDURE — 3079F DIAST BP 80-89 MM HG: CPT | Performed by: NURSE PRACTITIONER

## 2023-12-27 PROCEDURE — 1036F TOBACCO NON-USER: CPT | Performed by: NURSE PRACTITIONER

## 2023-12-27 PROCEDURE — 4010F ACE/ARB THERAPY RXD/TAKEN: CPT | Performed by: NURSE PRACTITIONER

## 2023-12-27 NOTE — PATIENT INSTRUCTIONS
Thank you for seeing me today.  It was a pleasure to see you again!    #HTN  Your blood pressure should be </= 130/80.  Today your blood pressure was 128/80  You should avoid foods that are high in sodium and saturated fats  Exercise daily for at least 30 minutes  minutes/week  Avoid stressful situations as this can increase your blood pressure  Take your medications as prescribed--do not skip doses  Obtain a BP monitor and check your blood pressure at home. Check it around the same time each day, at least 1 hour after taking your medication.  Record your blood pressure in a log and  bring your log with you to your next appointment.    Lab work ordered today.  Please have your blood drawn in the next 1-2 weeks.  You need to be FASTING for 12 hours prior to blood draw.  You may only have water.  Please contact your insurance company to ask about the best location to get blood drawn.  We will contact you with the results of your blood work and any necessary adjustments  to your plan of care, if you do not hear from us within 3-5 days of having your blood drawn, please call the office at [phone].    Call and schedule appt with pulmonary for f/u after CT chest    RTC 6 MONTHS FOR CPE

## 2023-12-29 ENCOUNTER — LAB (OUTPATIENT)
Dept: LAB | Facility: LAB | Age: 53
End: 2023-12-29
Payer: COMMERCIAL

## 2023-12-29 DIAGNOSIS — E55.9 VITAMIN D DEFICIENCY: ICD-10-CM

## 2023-12-29 DIAGNOSIS — R53.83 OTHER FATIGUE: ICD-10-CM

## 2023-12-29 DIAGNOSIS — E53.8 VITAMIN B12 DEFICIENCY: ICD-10-CM

## 2023-12-29 DIAGNOSIS — E11.42 TYPE 2 DIABETES MELLITUS WITH DIABETIC POLYNEUROPATHY, WITHOUT LONG-TERM CURRENT USE OF INSULIN (MULTI): ICD-10-CM

## 2023-12-29 LAB
25(OH)D3 SERPL-MCNC: 75 NG/ML (ref 31–100)
ALBUMIN SERPL-MCNC: 4.3 G/DL (ref 3.5–5)
ALP BLD-CCNC: 79 U/L (ref 35–125)
ALT SERPL-CCNC: 45 U/L (ref 5–40)
ANION GAP SERPL CALC-SCNC: 11 MMOL/L
AST SERPL-CCNC: 31 U/L (ref 5–40)
BASOPHILS # BLD AUTO: 0.06 X10*3/UL (ref 0–0.1)
BASOPHILS NFR BLD AUTO: 0.6 %
BILIRUB SERPL-MCNC: 0.9 MG/DL (ref 0.1–1.2)
BUN SERPL-MCNC: 15 MG/DL (ref 8–25)
CALCIUM SERPL-MCNC: 9.4 MG/DL (ref 8.5–10.4)
CHLORIDE SERPL-SCNC: 100 MMOL/L (ref 97–107)
CHOLEST SERPL-MCNC: 122 MG/DL (ref 133–200)
CHOLEST/HDLC SERPL: 3.1 {RATIO}
CO2 SERPL-SCNC: 26 MMOL/L (ref 24–31)
CREAT SERPL-MCNC: 0.9 MG/DL (ref 0.4–1.6)
CREAT UR-MCNC: 60.8 MG/DL
EOSINOPHIL # BLD AUTO: 0.09 X10*3/UL (ref 0–0.7)
EOSINOPHIL NFR BLD AUTO: 0.9 %
ERYTHROCYTE [DISTWIDTH] IN BLOOD BY AUTOMATED COUNT: 12.8 % (ref 11.5–14.5)
GFR SERPL CREATININE-BSD FRML MDRD: >90 ML/MIN/1.73M*2
GLUCOSE SERPL-MCNC: 91 MG/DL (ref 65–99)
HCT VFR BLD AUTO: 46.1 % (ref 41–52)
HDLC SERPL-MCNC: 39 MG/DL
HGB BLD-MCNC: 15.6 G/DL (ref 13.5–17.5)
IMM GRANULOCYTES # BLD AUTO: 0.07 X10*3/UL (ref 0–0.7)
IMM GRANULOCYTES NFR BLD AUTO: 0.7 % (ref 0–0.9)
LDLC SERPL CALC-MCNC: 64 MG/DL (ref 65–130)
LYMPHOCYTES # BLD AUTO: 3.29 X10*3/UL (ref 1.2–4.8)
LYMPHOCYTES NFR BLD AUTO: 33.6 %
MCH RBC QN AUTO: 29.5 PG (ref 26–34)
MCHC RBC AUTO-ENTMCNC: 33.8 G/DL (ref 32–36)
MCV RBC AUTO: 87 FL (ref 80–100)
MICROALBUMIN UR-MCNC: <12 MG/L (ref 0–23)
MICROALBUMIN/CREAT UR: NORMAL MG/G{CREAT}
MONOCYTES # BLD AUTO: 0.79 X10*3/UL (ref 0.1–1)
MONOCYTES NFR BLD AUTO: 8.1 %
NEUTROPHILS # BLD AUTO: 5.49 X10*3/UL (ref 1.2–7.7)
NEUTROPHILS NFR BLD AUTO: 56.1 %
NRBC BLD-RTO: 0 /100 WBCS (ref 0–0)
PLATELET # BLD AUTO: 284 X10*3/UL (ref 150–450)
POTASSIUM SERPL-SCNC: 4.7 MMOL/L (ref 3.4–5.1)
PROT SERPL-MCNC: 6.6 G/DL (ref 5.9–7.9)
RBC # BLD AUTO: 5.29 X10*6/UL (ref 4.5–5.9)
SODIUM SERPL-SCNC: 137 MMOL/L (ref 133–145)
TRIGL SERPL-MCNC: 94 MG/DL (ref 40–150)
TSH SERPL DL<=0.05 MIU/L-ACNC: 2.97 MIU/L (ref 0.27–4.2)
VIT B12 SERPL-MCNC: 783 PG/ML (ref 211–946)
WBC # BLD AUTO: 9.8 X10*3/UL (ref 4.4–11.3)

## 2023-12-29 PROCEDURE — 82306 VITAMIN D 25 HYDROXY: CPT

## 2023-12-29 PROCEDURE — 80053 COMPREHEN METABOLIC PANEL: CPT

## 2023-12-29 PROCEDURE — 85025 COMPLETE CBC W/AUTO DIFF WBC: CPT

## 2023-12-29 PROCEDURE — 82570 ASSAY OF URINE CREATININE: CPT

## 2023-12-29 PROCEDURE — 36415 COLL VENOUS BLD VENIPUNCTURE: CPT

## 2023-12-29 PROCEDURE — 82043 UR ALBUMIN QUANTITATIVE: CPT

## 2023-12-29 PROCEDURE — 84443 ASSAY THYROID STIM HORMONE: CPT

## 2023-12-29 PROCEDURE — 80061 LIPID PANEL: CPT

## 2023-12-29 PROCEDURE — 82607 VITAMIN B-12: CPT

## 2024-01-19 LAB — NONINV COLON CA DNA+OCC BLD SCRN STL QL: NEGATIVE

## 2024-01-24 ENCOUNTER — TELEPHONE (OUTPATIENT)
Dept: PULMONOLOGY | Facility: HOSPITAL | Age: 54
End: 2024-01-24

## 2024-01-24 ENCOUNTER — TELEMEDICINE (OUTPATIENT)
Dept: PULMONOLOGY | Facility: HOSPITAL | Age: 54
End: 2024-01-24
Payer: COMMERCIAL

## 2024-01-24 DIAGNOSIS — R91.1 LUNG NODULE: Primary | ICD-10-CM

## 2024-01-24 PROCEDURE — 99212 OFFICE O/P EST SF 10 MIN: CPT | Performed by: NURSE PRACTITIONER

## 2024-01-24 NOTE — TELEPHONE ENCOUNTER
Detailed message left requesting call back to office concerning provider's request to change pulmonary apppointment to a virtual meet.

## 2024-01-24 NOTE — PATIENT INSTRUCTIONS
1. Abnormal CT chest: small area of infiltrate and small nodule-- exam stable. New areas of ground glass/ inflammation - no infectious symptoms at that time   - will get CT chest in 3 months      2. Concern for GENESIS: excessive daytime sleepiness   - referral to sleep medicine at last visit     Thank you for visiting the Pulmonary clinic today!   Return to clinic  3 months after CT chest  or sooner if needed   Jazlyn Blas CNP  My office -  [phone]- Myrtle is my  and Ambika is my nurse.   Radiology scheduling [phone]   Appointment scheduling [phone]   Pulmonary function testing - [phone]

## 2024-01-24 NOTE — PROGRESS NOTES
Patient: Arnaldo Balderrama    [id number]  : 1970 -- AGE 53 y.o.    Provider: PHOENIX Weller-CNP     Location [facility]   Service Date: 2024              [facility]  Follow up visit note      HISTORY OF PRESENT ILLNESS       HISTORY OF PRESENT ILLNESS   Mr. Balderrama is a 53 year old CM (never smoker) here for follow up for abnormal CT chest. Last seen in clinic on 22.       PCP: Dr. Manuel    He denies any respiratory symptoms. He states he has SIMONS at times when he has a cold. He states his wife laying her head on his chest would cause him discomfort. He goes in/ out of cold at work - can make his nose runny. He does not think he felt sick prior to his CT scan in Dec. He denies any cough, wheezing, SIMONS, SOB at rest, or CP.     22: Since last visit he hears himself wheezing once a while with a deep breath. Reports SIMONS with pushing himself too much and over exerting himself. Denies cough, SOB at rest, allergies and chest pain. GERD is under good control with omeprazole. He did not go for sleep study as recommended with last visit. He states he feels his day time tiredness is related to not getting enough sleep.     22 Since last visit He denies any wheezing, SIMONS, SOB at rest, GERD, or CP. A few weeks ago he had some runny nose and post nasal drip. he had a bit of a cough at that time that has since resolved.      3/30/22: He had chills, no fever, sweating, shortness of breath, and chest pain. They could not find anything wrong. He had just that day started his testosterone shots -- did at 4 and went to ED at 10. He and his wife think it was all related to his sugars being low -- he had orange juice and felt better. CS was 71 and then down to 67 - was able to drink some juice and then felt better. He did not have any changes to his DM medications. He is not sure if his testosterone could have effected his blood sugar. He has not  had symptoms like this since then -- he did his next injection with eating pasta and did ok with it. He has some SIMONS with working out and at times with wearing a mask. He denies any coughing, wheezing, or SOB at rest. He has a little nasal congestion in the morning, but otherwise denies any respiratory symptoms. His GERD is under good control of omeprazole. He gets intermittent chest pains and has for several years. For some reason he has noticed it is worse in Feb/March and a few years ago he had a heart cath for similar symptoms. At that time he was told everything looked ok. He describes the pain as a twinge.      Previous pulmonary history: He has no history of recurrent infections, or lung disease as a child. He had no previous lung hx, never on oxygen or inhaler therapy.      Inhalers/nebulized medications: none      Hospitalization History: He has not been hospitalized over the last year for breathing related problem.     Sleep history: He does not snore often. He never wakes up rested -- he doesn't sleep well. He dozes off often in the evening.        ALLERGIES AND MEDICATIONS     ALLERGIES  No Known Allergies    MEDICATIONS  Current Outpatient Medications   Medication Sig Dispense Refill    acetaminophen (Tylenol) 500 mg tablet Take 1 tablet (500 mg) by mouth every 4 hours if needed for mild pain (1 - 3).      aspirin 81 mg EC tablet Take 1 tablet (81 mg) by mouth once daily.      atorvastatin (Lipitor) 40 mg tablet Take 1 tablet (40 mg) by mouth once daily.      cholecalciferol (Vitamin D3) 50 mcg (2,000 unit) capsule TAKE ONE CAPSULE BY MOUTH DAILY 90 capsule 3    clopidogrel (Plavix) 75 mg tablet TAKE ONE TABLET BY MOUTH DAILY 90 tablet 3    ezetimibe (Zetia) 10 mg tablet       ibuprofen 600 mg tablet Take 1 tablet (600 mg) by mouth every 6 hours if needed for mild pain (1 - 3).      isosorbide mononitrate ER (Imdur) 30 mg 24 hr tablet TAKE ONE TABLET BY MOUTH DAILY AT BEDTIME 90 tablet 3    Jardiance 25  mg Take 1 tablet (25 mg) by mouth once daily.      lisinopril 20 mg tablet TAKE ONE TABLET BY MOUTH EVERY DAY 90 tablet 3    metFORMIN (Glucophage) 500 mg tablet TAKE TWO TABLETS (1000 MG) BY MOUTH TWO TIMES A DAY WITH MEALS 360 tablet 0    omeprazole (PriLOSEC) 20 mg DR capsule Take by mouth.      polyethylene glycol (Glycolax, Miralax) 17 gram/dose powder MIX 17 GRAMS INTO 4-8 OUNCES OF WATER OR JUICE AND TAKE BY MOUTH ONCE A  g 1    Trulicity 3 mg/0.5 mL pen injector USE AS DIRECTED. INJECT 3mg SUBCUTANEOUSLY WEEKLY       No current facility-administered medications for this visit.         PAST HISTORY     PAST MEDICAL HISTORY  - HTN   - CAD s/p CABG - 2/2017                                                                                 - HLD   - DMII                                              - GERD              - ? thyroid issues                                 PAST SURGICAL HISTORY  Past Surgical History:   Procedure Laterality Date    CARDIAC CATHETERIZATION  03/2020    CARDIAC SURGERY  2017    CORONARY ARTERY BYPASS GRAFT  01/16/2018    CABG    GALLBLADDER SURGERY  06/2023    OTHER SURGICAL HISTORY  12/08/2022    Cataract surgery       IMMUNIZATION HISTORY  Immunization History   Administered Date(s) Administered    Flu vaccine (IIV4), preservative free *Check age/dose* 09/25/2018, 09/15/2021    Flu vaccine, quadrivalent, no egg protein, age 6 month or greater (FLUCELVAX) 09/23/2020, 10/07/2023    Influenza, Unspecified 09/03/2022    Influenza, injectable, quadrivalent 09/01/2019, 09/25/2019    Influenza, injectable, quadrivalent, preservative free, pediatric 09/20/2022    Influenza, seasonal, injectable, preservative free 10/03/2016, 10/10/2020    Influenza, trivalent, adjuvanted 09/12/2017    Pfizer COVID-19 vaccine, Fall 2023, 12 years and older, (30mcg/0.3mL) 10/07/2023    Pfizer Purple Cap SARS-CoV-2 03/17/2021, 04/14/2021, 10/26/2021    Tdap vaccine, age 7 year and older (BOOSTRIX) 06/23/2012     Zoster, Unspecified 12/08/2021, 12/08/2022       SOCIAL HISTORY  smoking: significant 2nd hand smoke exposure from parents   drinking: none  illicit drug use: none     OCCUPATIONAL/ENVIRONMENTAL HISTORY  Occupation: Currently works in maintenance - machining, welding, insulation      FAMILY HISTORY  GENESIS - mother and two brothers   Lung cancer - father - surgery for removal, grand father, and uncle   COPD - Father     RESULTS/DATA     Pulmonary Function Test Results     None on record     Chest Radiograph     XR chest 1 view   Impression  No acute finding.    [id number]      Chest CT Scan     CT chest:   - 5/9/22 - Stable size but with slightly reduced attenuation of a peribronchial focal ground-glass opacity within right upper lobe, measuring approximately 11 mm in average diameter. Although, the findings likely favor underlying inflammatory etiology, attention on follow-up CT scan chest within 6-9 months is recommended to assess stability/document resolution. No definite airspace consolidation, pleural effusion or pneumothorax. Esophagus is mildly patulous throughout its course and correlate with concern for gastroesophageal reflux versus motility disorder. Severe coronary artery calcifications, status post CABG. Redemonstrated layering hyperdensity within the gallbladder,  likely representing gallbladder sludge versus stones. No CT evidence of acute cholecystitis.  - 12/12/22 Similar appearance of a right upper lobe ground-glass nodule as compared to 05/09/2022 and appears less conspicuous as compared to 02/02/2022.Pulmonary nodules detected on CT images. Nonspecific 4 mm ground-glass nodule the left lower lobe. No further follow-up is required, however, if the nodule morphology is suspicious, consider follow up at 2 and 4 years; if grows or increasingly solid, consider resection. Chronic findings as above.    ----------------------------------     [facility] - 2/22/22 - No CT signs of pulmonary  embolism, aortic aneurysm or aortic dissection Small right upper lobe infiltrate. Also there is a 3 mm left lower lobe noncalcified nodule. Follow-up of the infiltrate to complete resolution is needed to exclude underlying neoplasm             . Hyperdense material in the gallbladder either sludge or gallstones. Postthoracotomy changes  -----------  12/13/23 - IMPRESSION:  Mild patchy ground-glass opacities in the right upper lobe, some of  which are nodular appearance, progressed when compared to the  previous study as evidenced by an increase in their size and number.  New similar appearing mild patchy ground-glass opacities in the  lingula.  Interval resolution of the 4 mm ground-glass nodule within  the left lower lobe. Findings are nonspecific and may relate to an  infectious or inflammatory process but will require continued  surveillance.      Echocardiogram        Echo: 1/19/18 - EF 50-55% . Abnormal septal motion consistent with post-operative status. Spectral Doppler shows an impaired relaxation pattern of left ventricular diastolic filling. There is mild mitral and tricuspid regurgitation.          Labs/ Other testing        REVIEW OF SYSTEMS     REVIEW OF SYSTEMS  Review of Systems      PHYSICAL EXAM     VITAL SIGNS: There were no vitals taken for this visit.     CURRENT WEIGHT: [unfilled]  BMI: [unfilled]  PREVIOUS WEIGHTS:  Wt Readings from Last 3 Encounters:   12/27/23 94.8 kg (209 lb)   12/21/23 93 kg (205 lb)   11/07/23 91 kg (200 lb 11.2 oz)       Physical Exam- pt in no acute distress on live audio/video chat    ASSESSMENT/PLAN       1. Abnormal CT chest: small area of infiltrate and small nodule-- exam stable. New areas of ground glass/ inflammation - no infectious symptoms at that time   - will get CT chest in 3 months      2. Concern for GENESIS: excessive daytime sleepiness   - referral to sleep medicine at last visit       Spoke with the patient on live audio/video chat for 10 minutes.

## 2024-02-13 ENCOUNTER — APPOINTMENT (OUTPATIENT)
Dept: RADIOLOGY | Facility: HOSPITAL | Age: 54
End: 2024-02-13
Payer: COMMERCIAL

## 2024-02-13 ENCOUNTER — HOSPITAL ENCOUNTER (EMERGENCY)
Facility: HOSPITAL | Age: 54
Discharge: HOME | End: 2024-02-13
Payer: COMMERCIAL

## 2024-02-13 VITALS
WEIGHT: 208 LBS | HEIGHT: 67 IN | HEART RATE: 88 BPM | RESPIRATION RATE: 16 BRPM | DIASTOLIC BLOOD PRESSURE: 77 MMHG | BODY MASS INDEX: 32.65 KG/M2 | OXYGEN SATURATION: 95 % | SYSTOLIC BLOOD PRESSURE: 155 MMHG | TEMPERATURE: 97.7 F

## 2024-02-13 DIAGNOSIS — S02.85XA CLOSED FRACTURE OF ORBIT, INITIAL ENCOUNTER (MULTI): ICD-10-CM

## 2024-02-13 DIAGNOSIS — S09.93XA FACIAL INJURY, INITIAL ENCOUNTER: Primary | ICD-10-CM
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
## 2024-02-13 PROCEDURE — 96365 THER/PROPH/DIAG IV INF INIT: CPT

## 2024-02-13 PROCEDURE — 70486 CT MAXILLOFACIAL W/O DYE: CPT

## 2024-02-13 PROCEDURE — 90715 TDAP VACCINE 7 YRS/> IM: CPT

## 2024-02-13 PROCEDURE — 90471 IMMUNIZATION ADMIN: CPT

## 2024-02-13 PROCEDURE — 99285 EMERGENCY DEPT VISIT HI MDM: CPT | Mod: 25

## 2024-02-13 PROCEDURE — 2500000005 HC RX 250 GENERAL PHARMACY W/O HCPCS

## 2024-02-13 PROCEDURE — 2500000001 HC RX 250 WO HCPCS SELF ADMINISTERED DRUGS (ALT 637 FOR MEDICARE OP)

## 2024-02-13 PROCEDURE — 72125 CT NECK SPINE W/O DYE: CPT

## 2024-02-13 PROCEDURE — 2500000004 HC RX 250 GENERAL PHARMACY W/ HCPCS (ALT 636 FOR OP/ED)

## 2024-02-13 PROCEDURE — 96375 TX/PRO/DX INJ NEW DRUG ADDON: CPT

## 2024-02-13 PROCEDURE — 70450 CT HEAD/BRAIN W/O DYE: CPT

## 2024-02-13 PROCEDURE — 76377 3D RENDER W/INTRP POSTPROCES: CPT

## 2024-02-13 RX ORDER — IBUPROFEN 600 MG/1
600 TABLET ORAL ONCE
Status: COMPLETED | OUTPATIENT
Start: 2024-02-13 | End: 2024-02-13

## 2024-02-13 RX ADMIN — IBUPROFEN 600 MG: 600 TABLET, FILM COATED ORAL at 14:54

## 2024-02-13 RX ADMIN — TETANUS TOXOID, REDUCED DIPHTHERIA TOXOID AND ACELLULAR PERTUSSIS VACCINE, ADSORBED 0.5 ML: 5; 2.5; 8; 8; 2.5 SUSPENSION INTRAMUSCULAR at 14:54

## 2024-02-13 RX ADMIN — Medication 1 TUBE: at 14:40

## 2024-02-13 ASSESSMENT — COLUMBIA-SUICIDE SEVERITY RATING SCALE - C-SSRS
2. HAVE YOU ACTUALLY HAD ANY THOUGHTS OF KILLING YOURSELF?: NO
1. IN THE PAST MONTH, HAVE YOU WISHED YOU WERE DEAD OR WISHED YOU COULD GO TO SLEEP AND NOT WAKE UP?: NO
6. HAVE YOU EVER DONE ANYTHING, STARTED TO DO ANYTHING, OR PREPARED TO DO ANYTHING TO END YOUR LIFE?: NO

## 2024-02-13 ASSESSMENT — PAIN SCALES - GENERAL: PAINLEVEL_OUTOF10: 5 - MODERATE PAIN

## 2024-02-13 ASSESSMENT — PAIN - FUNCTIONAL ASSESSMENT: PAIN_FUNCTIONAL_ASSESSMENT: 0-10

## 2024-02-13 NOTE — DISCHARGE INSTRUCTIONS
Please follow-up with the oral maxillofacial surgeon within 5 days for further evaluation of your CT scans and plan for your facial fractures.  Please take sinus precautions and prevent sneezing or blowing your nose.  Back to ER for any worsening of symptoms or decreased vision.  You may take Tylenol and ibuprofen for your pain at home.

## 2024-02-13 NOTE — ED PROVIDER NOTES
HPI   Chief Complaint   Patient presents with    Facial Injury     Was on a ladder and was trying to tighten a bolt and fell over and landed on his face. Was about 3-4 feet up on ladder.       Patient is a 53-year-old male presenting for evaluation of fall and facial injury.  Patient was at work up 3 to 4 feet on a ladder and states he fell and hit his face on the cement.  He is endorsing facial pain primarily on the right side of his face.  He denies headache, visual changes, dizziness.  No loss of consciousness not on blood thinners.  States he also scraped up his left arm but is refusing x-ray of the left arm at this time and is denying other injury or trauma.  Patient unsure of his last tetanus shot.  Denies chest pain or shortness of breath.  Denies abdominal pain.  No other acute complaints at this time.                        [place] Coma Scale Score: 15                     Patient History   Past Medical History:   Diagnosis Date    Atherosclerosis of native coronary artery of native heart without angina pectoris     Chronic gout without tophus, unspecified cause, unspecified site     Hyperlipidemia     Hypothyroidism, unspecified 12/02/2020    Primary hypothyroidism    Other fatigue 12/02/2020    Tiredness    Personal history of other diseases of the musculoskeletal system and connective tissue 09/24/2019    History of gout    Personal history of other specified conditions 10/09/2018    History of nausea and vomiting    Type 2 diabetes mellitus with diabetic polyneuropathy, without long-term current use of insulin (CMS/[facility])      Past Surgical History:   Procedure Laterality Date    CARDIAC CATHETERIZATION  03/2020    CARDIAC SURGERY  2017    CORONARY ARTERY BYPASS GRAFT  01/16/2018    CABG    GALLBLADDER SURGERY  06/2023    OTHER SURGICAL HISTORY  12/08/2022    Cataract surgery     Family History   Problem Relation Name Age of Onset    Thyroid disease Mother      Diabetes Father      Heart disease Father       Thyroid disease Father      Hypertension Sibling       Social History     Tobacco Use    Smoking status: Never     Passive exposure: Never    Smokeless tobacco: Never   Vaping Use    Vaping Use: Never used   Substance Use Topics    Alcohol use: Yes     Comment: SOCIAL    Drug use: Never       Physical Exam   ED Triage Vitals [02/13/24 1125]   Temperature Heart Rate Respirations BP   36.5 °C (97.7 °F) (!) 103 18 160/79      Pulse Ox Temp src Heart Rate Source Patient Position   95 % -- -- --      BP Location FiO2 (%)     -- --       Physical Exam  Vitals and nursing note reviewed.   Constitutional:       General: He is not in acute distress.     Appearance: He is not toxic-appearing.      Comments: Alert oriented resting in hospital chair with tissue to his nose   HENT:      Head:      Comments: 2 cm very superficial laceration above the eye with periorbital swelling and contusion.  Abrasion to the right cheek.     Ears:      Comments: No Ford sign     Nose:      Comments: Dried blood at the nose otherwise no tenderness to palpation     Mouth/Throat:      Comments: Dentition intact, no evidence of lesions or bleeding in the mouth.  No trismus.  Eyes:      Comments: No entrapment.  Pupils equal and reactive to light.  Extraocular eye movements intact   Neck:      Comments: No midline tenderness  Cardiovascular:      Rate and Rhythm: Normal rate and regular rhythm.      Heart sounds: Normal heart sounds.   Pulmonary:      Effort: Pulmonary effort is normal.      Breath sounds: Normal breath sounds.      Comments: Breath sounds equal bilaterally  Abdominal:      General: Abdomen is flat.      Palpations: Abdomen is soft.      Tenderness: There is no abdominal tenderness.      Comments: No ecchymosis   Musculoskeletal:      Cervical back: Normal range of motion and neck supple.   Skin:     General: Skin is warm and dry.   Neurological:      Mental Status: He is oriented to person, place, and time.      Comments: [facility]  0.  No cranial nerve deficit.  Symmetric strength of bilateral upper and lower extremities.  No sensory deficit.  Gait intact.   Psychiatric:         Mood and Affect: Mood normal.         Behavior: Behavior normal.         ED Course & MDM   ED Course as of 02/14/24 0714 Tue Feb 13, 2024 1454 Spoke with oral maxillofacial trauma surgeon Dr. Maharaj.  His recommendations include sinus protocol with no blowing of nose and follow-up with him in the office in 3 to 5 days for further assessment. [JJ]      ED Course User Index  [JJ] Vilma Urbano PA-C         Diagnoses as of 02/14/24 0714   Facial injury, initial encounter   Fall from ladder, initial encounter   Closed fracture of orbit, initial encounter (CMS/[facility])       Medical Decision Making  Parts of this chart have been completed using voice recognition software. Please excuse any errors of transcription.  My thought process and reason for plan has been formulated from the time that I saw the patient until the time of disposition and is not specific to one specific moment during their visit and furthermore my MDM encompasses this entire chart and not only this text box.      HPI: Detailed above.    Exam: A medically appropriate exam performed, outlined above, given the known history and presentation.    History obtained from: Patient    Social Determinants of Health considered during this visit: Lives independently    Medications given during visit:  Medications   ibuprofen tablet 600 mg (600 mg oral Given 2/13/24 1454)   diphth,pertus(acell),tetanus (BoostRIX) 2.5-8-5 Lf-mcg-Lf/0.5mL vaccine 0.5 mL (0.5 mL intramuscular Given 2/13/24 1454)   2-octyl cyanoacrylate (Octylseal) adhesive 1 Tube (1 Tube Topical Given 2/13/24 1440)        Diagnostic/tests  Labs Reviewed - No data to display   CT head wo IV contrast   Final Result   No CT evidence for acute intracranial pathology.        Right periorbital soft tissue swelling. Fractures of the right   anterior, lateral  and medial right maxillary sinus as well as a   minimally displaced fracture through the lateral wall of the right   orbit, better described and seen on facial bones CT.        Signed by: Lisandro Samson 2/13/2024 1:56 PM   Dictation workstation:   [id number]      CT cervical spine wo IV contrast   Final Result   No acute fracture or spondylolisthesis.             Signed by: Lisandro Samson 2/13/2024 2:01 PM   Dictation workstation:   [id number]      CT maxillofacial bones wo IV contrast   Final Result   Right periorbital soft tissue swelling. There is a mildly comminuted   but nondisplaced fracture through the zygoma. The right zygomatic   arch appears intact. There is also a minimally displaced fracture   through the lateral wall of the right orbit. Comminuted nondisplaced   fracture of the orbital floor, without significant herniation of   orbital fat into the right maxillary sinus. The orbital floor   fracture fragments are not significantly displaced inferiorly. There   also comminuted fractures of the lateral wall and anterior wall of   the right maxillary sinus. There is a nondisplaced fracture of the   medial wall of the maxillary sinus, otherwise the medial wall of the   maxillary sinuses intact. Hemorrhagic products are noted within the   right maxillary sinus with near complete opacification.        MACRO:   None        Signed by: Lisandro Samson 2/13/2024 2:16 PM   Dictation workstation:   [id number]           Considerations/further MDM:  53-year-old male presenting for evaluation of fall and facial injury.  During ER visit the patient is alert and oriented, neurologically intact in no acute distress.  Vital signs within normal limits during the visit.  Given the nature of the injury CT head, C-spine and maxillofacial bones was warranted.  Patient's laceration and skin versions were cleaned appropriately by myself and nursing staff.  CT significant for comminuted nondisplaced fracture through  the zygoma, orbital wall fractures without significant herniation of orbital flat and orbital floor fractures.  There is also a nondisplaced fracture of the medial wall of the maxillary sinus.  I spoke with oral maxillofacial surgeon as described in ED course we discussed patient case.  He is agreeable to discharging the patient home and following up with him in the office within the next 3 to 5 days.  Patient was educated on this and is agreeable.  He was educated to take sinus precautions.  He was provided his Boostrix and ibuprofen for while in the ER.  Dermabond was utilized to seal his minimal laceration to the right eyebrow.  He was discharged home in stable condition with proper follow-up.      Procedure  Procedures     Vilma Urbano PA-C  02/14/24 0755

## 2024-03-20 DIAGNOSIS — E11.69 DIABETES MELLITUS TYPE 2 IN OBESE: ICD-10-CM

## 2024-03-20 DIAGNOSIS — E66.9 DIABETES MELLITUS TYPE 2 IN OBESE: ICD-10-CM

## 2024-03-21 RX ORDER — METFORMIN HYDROCHLORIDE 500 MG/1
TABLET ORAL
Qty: 360 TABLET | Refills: 0 | Status: SHIPPED | OUTPATIENT
Start: 2024-03-21

## 2024-04-13 DIAGNOSIS — E11.42 TYPE 2 DIABETES MELLITUS WITH DIABETIC POLYNEUROPATHY, WITHOUT LONG-TERM CURRENT USE OF INSULIN (MULTI): Primary | ICD-10-CM

## 2024-04-15 RX ORDER — ATORVASTATIN CALCIUM 40 MG/1
40 TABLET, FILM COATED ORAL DAILY
Qty: 30 TABLET | Refills: 0 | Status: SHIPPED | OUTPATIENT
Start: 2024-04-15

## 2024-04-18 DIAGNOSIS — E11.42 TYPE 2 DIABETES MELLITUS WITH DIABETIC POLYNEUROPATHY, WITHOUT LONG-TERM CURRENT USE OF INSULIN (MULTI): Primary | ICD-10-CM

## 2024-04-19 RX ORDER — DULAGLUTIDE 3 MG/.5ML
INJECTION, SOLUTION SUBCUTANEOUS
Qty: 2 ML | Refills: 5 | Status: SHIPPED | OUTPATIENT
Start: 2024-04-19

## 2024-04-24 ENCOUNTER — HOSPITAL ENCOUNTER (OUTPATIENT)
Dept: RADIOLOGY | Facility: CLINIC | Age: 54
Discharge: HOME | End: 2024-04-24
Payer: COMMERCIAL

## 2024-04-24 DIAGNOSIS — R91.1 LUNG NODULE: ICD-10-CM

## 2024-04-24 PROCEDURE — 71250 CT THORAX DX C-: CPT | Performed by: RADIOLOGY

## 2024-04-24 PROCEDURE — 71250 CT THORAX DX C-: CPT

## 2024-05-01 ENCOUNTER — OFFICE VISIT (OUTPATIENT)
Dept: PULMONOLOGY | Facility: HOSPITAL | Age: 54
End: 2024-05-01
Payer: COMMERCIAL

## 2024-05-01 VITALS
WEIGHT: 215.39 LBS | TEMPERATURE: 97.5 F | BODY MASS INDEX: 33.73 KG/M2 | SYSTOLIC BLOOD PRESSURE: 150 MMHG | HEART RATE: 83 BPM | DIASTOLIC BLOOD PRESSURE: 74 MMHG

## 2024-05-01 DIAGNOSIS — R91.1 LUNG NODULE: ICD-10-CM

## 2024-05-01 PROCEDURE — 99213 OFFICE O/P EST LOW 20 MIN: CPT | Performed by: NURSE PRACTITIONER

## 2024-05-01 PROCEDURE — 3077F SYST BP >= 140 MM HG: CPT | Performed by: NURSE PRACTITIONER

## 2024-05-01 PROCEDURE — 3078F DIAST BP <80 MM HG: CPT | Performed by: NURSE PRACTITIONER

## 2024-05-01 PROCEDURE — 4010F ACE/ARB THERAPY RXD/TAKEN: CPT | Performed by: NURSE PRACTITIONER

## 2024-05-01 ASSESSMENT — ENCOUNTER SYMPTOMS
HEADACHES: 0
JOINT SWELLING: 0
FATIGUE: 0
FEVER: 0
PALPITATIONS: 0
EYE PAIN: 0
DIZZINESS: 0
NAUSEA: 0
NUMBNESS: 0
DIARRHEA: 0
WEAKNESS: 0
VOICE CHANGE: 0
ARTHRALGIAS: 0
BACK PAIN: 0
VOMITING: 0
MYALGIAS: 0
NERVOUS/ANXIOUS: 0
SINUS PRESSURE: 0
ABDOMINAL PAIN: 0
AGITATION: 0
RHINORRHEA: 0

## 2024-05-01 ASSESSMENT — PAIN SCALES - GENERAL: PAINLEVEL: 0-NO PAIN

## 2024-05-01 NOTE — PATIENT INSTRUCTIONS
1. Abnormal CT chest: small area of infiltrate and small nodule-- exam stable. New areas of ground glass/ inflammation - no infectious symptoms at that time   - will get CT chest in 1 year      2. Concern for GENESIS: excessive daytime sleepiness   - referral to sleep medicine at last visit -- declined       Thank you for visiting the Pulmonary clinic today!   Return to clinic 1 year after CT chest or sooner if needed   Jazlyn Blas CNP  My office -  [phone]- Myrtle is my  and Ambika is my nurse.   Radiology scheduling [phone]   Appointment scheduling [phone]   Pulmonary function testing - [phone]

## 2024-05-01 NOTE — PROGRESS NOTES
Patient: Arnaldo Balderrama    [id number]  : 1970 -- AGE 53 y.o.    Provider: PHOENIX Weller-CNP     Location [facility]   Service Date: 2024              [facility]  Follow up visit note      HISTORY OF PRESENT ILLNESS       HISTORY OF PRESENT ILLNESS   Mr. Balderrama is a 53 year old CM (never smoker) here for follow up for abnormal CT chest. Last seen in clinic on 24.      PCP: Dr. Manuel    Since last visit he continues to deny any respiratory symptoms. He denies any SOB at rest, cough, wheezing, or CP.  He states he had a little SIMONS yesterday - he was not sure if he over did it.  He denies any allergies. He feels his GERD is under good control on his PPI. Discussed walking more - currently working 80 hours a week at two jobs so feels he does not have time.     24: He denies any respiratory symptoms. He states he has SIMONS at times when he has a cold. He states his wife laying her head on his chest would cause him discomfort. He goes in/ out of cold at work - can make his nose runny. He does not think he felt sick prior to his CT scan in Dec. He denies any cough, wheezing, SIMONS, SOB at rest, or CP.     22: Since last visit he hears himself wheezing once a while with a deep breath. Reports SIMONS with pushing himself too much and over exerting himself. Denies cough, SOB at rest, allergies and chest pain. GERD is under good control with omeprazole. He did not go for sleep study as recommended with last visit. He states he feels his day time tiredness is related to not getting enough sleep.     22 Since last visit He denies any wheezing, SIMONS, SOB at rest, GERD, or CP. A few weeks ago he had some runny nose and post nasal drip. he had a bit of a cough at that time that has since resolved.      3/30/22: He had chills, no fever, sweating, shortness of breath, and chest pain. They could not find anything wrong. He had just that day  started his testosterone shots -- did at 4 and went to ED at 10. He and his wife think it was all related to his sugars being low -- he had orange juice and felt better. CS was 71 and then down to 67 - was able to drink some juice and then felt better. He did not have any changes to his DM medications. He is not sure if his testosterone could have effected his blood sugar. He has not had symptoms like this since then -- he did his next injection with eating pasta and did ok with it. He has some SIMONS with working out and at times with wearing a mask. He denies any coughing, wheezing, or SOB at rest. He has a little nasal congestion in the morning, but otherwise denies any respiratory symptoms. His GERD is under good control of omeprazole. He gets intermittent chest pains and has for several years. For some reason he has noticed it is worse in Feb/March and a few years ago he had a heart cath for similar symptoms. At that time he was told everything looked ok. He describes the pain as a twinge.      Previous pulmonary history: He has no history of recurrent infections, or lung disease as a child. He had no previous lung hx, never on oxygen or inhaler therapy.      Inhalers/nebulized medications: none      Hospitalization History: He has not been hospitalized over the last year for breathing related problem.     Sleep history: He does not snore often. He never wakes up rested -- he doesn't sleep well. He dozes off often in the evening.        ALLERGIES AND MEDICATIONS     ALLERGIES  No Known Allergies    MEDICATIONS  Current Outpatient Medications   Medication Sig Dispense Refill    aspirin 81 mg EC tablet Take 1 tablet (81 mg) by mouth once daily.      atorvastatin (Lipitor) 40 mg tablet TAKE ONE TABLET BY MOUTH ONCE A DAY 30 tablet 0    cholecalciferol (Vitamin D3) 50 mcg (2,000 unit) capsule TAKE ONE CAPSULE BY MOUTH DAILY 90 capsule 3    clopidogrel (Plavix) 75 mg tablet TAKE ONE TABLET BY MOUTH DAILY 90 tablet 3     dulaglutide (Trulicity) 3 mg/0.5 mL pen injector USE AS DIRECTED. INJECT 3mg SUBCUTANEOUSLY WEEKLY 2 mL 5    ezetimibe (Zetia) 10 mg tablet       isosorbide mononitrate ER (Imdur) 30 mg 24 hr tablet TAKE ONE TABLET BY MOUTH DAILY AT BEDTIME 90 tablet 3    Jardiance 25 mg Take 1 tablet (25 mg) by mouth once daily.      lisinopril 20 mg tablet TAKE ONE TABLET BY MOUTH EVERY DAY 90 tablet 3    metFORMIN (Glucophage) 500 mg tablet take 2 tablets by mouth (1000mg) by mouth 2 times a day with meals. 360 tablet 0    omeprazole (PriLOSEC) 20 mg DR capsule Take by mouth.      acetaminophen (Tylenol) 500 mg tablet Take 1 tablet (500 mg) by mouth every 4 hours if needed for mild pain (1 - 3).      ibuprofen 600 mg tablet Take 1 tablet (600 mg) by mouth every 6 hours if needed for mild pain (1 - 3).      polyethylene glycol (Glycolax, Miralax) 17 gram/dose powder MIX 17 GRAMS INTO 4-8 OUNCES OF WATER OR JUICE AND TAKE BY MOUTH ONCE A DAY (Patient not taking: Reported on 5/1/2024) 510 g 1     No current facility-administered medications for this visit.         PAST HISTORY     PAST MEDICAL HISTORY  - HTN   - CAD s/p CABG - /2017                                                                                 - HLD   - DMII                                              - GERD              - ? thyroid issues                              PAST SURGICAL HISTORY  Past Surgical History:   Procedure Laterality Date    CARDIAC CATHETERIZATION  03/2020    CARDIAC SURGERY  2017    CORONARY ARTERY BYPASS GRAFT  01/16/2018    CABG    GALLBLADDER SURGERY  06/2023    OTHER SURGICAL HISTORY  12/08/2022    Cataract surgery       IMMUNIZATION HISTORY  Immunization History   Administered Date(s) Administered    Flu vaccine (IIV4), preservative free *Check age/dose* 09/25/2018, 09/15/2021    Flu vaccine, quadrivalent, no egg protein, age 6 month or greater (FLUCELVAX) 09/23/2020, 10/07/2023    Influenza, Unspecified 09/03/2022    Influenza,  injectable, quadrivalent 09/01/2019, 09/25/2019    Influenza, injectable, quadrivalent, preservative free, pediatric 09/20/2022    Influenza, seasonal, injectable, preservative free 10/03/2016, 10/10/2020    Influenza, trivalent, adjuvanted 09/12/2017    Pfizer COVID-19 vaccine, Fall 2023, 12 years and older, (30mcg/0.3mL) 10/07/2023    Pfizer Purple Cap SARS-CoV-2 03/17/2021, 04/14/2021, 10/26/2021    Tdap vaccine, age 7 year and older (BOOSTRIX, ADACEL) 06/23/2012, 02/13/2024    Zoster, Unspecified 12/08/2021, 12/08/2022       SOCIAL HISTORY  smoking: significant 2nd hand smoke exposure from parents   drinking: none  illicit drug use: none     OCCUPATIONAL/ENVIRONMENTAL HISTORY  Occupation: Currently works in maintenance - machining, welding, insulation      FAMILY HISTORY  GENESIS - mother and two brothers   Lung cancer - father - surgery for removal, grand father, and uncle   COPD - Father     RESULTS/DATA     Pulmonary Function Test Results     None on record     Chest Radiograph     XR chest 1 view   Impression  No acute finding.    [id number]      Chest CT Scan     CT chest:   - 5/9/22 - Stable size but with slightly reduced attenuation of a peribronchial focal ground-glass opacity within right upper lobe, measuring approximately 11 mm in average diameter. Although, the findings likely favor underlying inflammatory etiology, attention on follow-up CT scan chest within 6-9 months is recommended to assess stability/document resolution. No definite airspace consolidation, pleural effusion or pneumothorax. Esophagus is mildly patulous throughout its course and correlate with concern for gastroesophageal reflux versus motility disorder. Severe coronary artery calcifications, status post CABG. Redemonstrated layering hyperdensity within the gallbladder,  likely representing gallbladder sludge versus stones. No CT evidence of acute cholecystitis.  - 12/12/22 Similar appearance of a right upper lobe ground-glass  nodule as compared to 05/09/2022 and appears less conspicuous as compared to 02/02/2022.Pulmonary nodules detected on CT images. Nonspecific 4 mm ground-glass nodule the left lower lobe. No further follow-up is required, however, if the nodule morphology is suspicious, consider follow up at 2 and 4 years; if grows or increasingly solid, consider resection. Chronic findings as above.    ----------------------------------     [facility] CTPE - 2/22/22 - No CT signs of pulmonary embolism, aortic aneurysm or aortic dissection Small right upper lobe infiltrate. Also there is a 3 mm left lower lobe noncalcified nodule. Follow-up of the infiltrate to complete resolution is needed to exclude underlying neoplasm             . Hyperdense material in the gallbladder either sludge or gallstones. Postthoracotomy changes  -----------  12/13/23 - IMPRESSION:  Mild patchy ground-glass opacities in the right upper lobe, some of  which are nodular appearance, progressed when compared to the  previous study as evidenced by an increase in their size and number.  New similar appearing mild patchy ground-glass opacities in the  lingula.  Interval resolution of the 4 mm ground-glass nodule within  the left lower lobe. Findings are nonspecific and may relate to an  infectious or inflammatory process but will require continued  surveillance.    - 4/24/24 - Improvement of right upper lobe airspace opacities presumed  inflammatory. Some residual focal bronchiectasis detected in the  right upper lobe not significantly changed.  2. New area of subtle ground-glass opacity seen in the left upper  lobe as well as a ground-glass nodule also new. These are highly  likely inflammatory. Conservative management would be a follow-up in  12 months  3. Median sternotomy. Heart size is enlarged. Hepatic steatosis.  Vascular calcification.  4. Small sliding hiatal hernia.          Echocardiogram        Echo: 1/19/18 - EF 50-55% . Abnormal septal motion  consistent with post-operative status. Spectral Doppler shows an impaired relaxation pattern of left ventricular diastolic filling. There is mild mitral and tricuspid regurgitation.          Labs/ Other testing        REVIEW OF SYSTEMS     REVIEW OF SYSTEMS  Review of Systems   Constitutional:  Negative for fatigue and fever.   HENT:  Negative for congestion, postnasal drip, rhinorrhea, sinus pressure and voice change.    Eyes:  Negative for pain and visual disturbance.   Cardiovascular:  Negative for chest pain, palpitations and leg swelling.   Gastrointestinal:  Negative for abdominal pain, diarrhea, nausea and vomiting.   Endocrine: Negative for cold intolerance and heat intolerance.   Musculoskeletal:  Negative for arthralgias, back pain, joint swelling and myalgias.   Skin:  Negative for rash.   Neurological:  Negative for dizziness, weakness, numbness and headaches.   Psychiatric/Behavioral:  Negative for agitation. The patient is not nervous/anxious.          PHYSICAL EXAM     VITAL SIGNS: /74 (BP Location: Left arm, Patient Position: Sitting)   Pulse 83   Temp 36.4 °C (97.5 °F)   Wt 97.7 kg (215 lb 6.2 oz)   BMI 33.73 kg/m²      CURRENT WEIGHT: [unfilled]  BMI: [unfilled]  PREVIOUS WEIGHTS:  Wt Readings from Last 3 Encounters:   05/01/24 97.7 kg (215 lb 6.2 oz)   02/13/24 94.3 kg (208 lb)   12/27/23 94.8 kg (209 lb)       Physical Exam  Vitals reviewed.   Constitutional:       General: He is not in acute distress.     Appearance: Normal appearance. He is not ill-appearing or toxic-appearing.   HENT:      Head: Normocephalic.      Nose: No rhinorrhea.   Cardiovascular:      Rate and Rhythm: Normal rate and regular rhythm.      Heart sounds: Normal heart sounds.   Pulmonary:      Effort: Pulmonary effort is normal. No respiratory distress.      Breath sounds: Normal breath sounds. No stridor. No wheezing, rhonchi or rales.   Abdominal:      General: Abdomen is flat.   Musculoskeletal:         General:  Normal range of motion.      Right lower leg: No edema.      Left lower leg: No edema.   Skin:     General: Skin is warm and dry.      Nails: There is no clubbing.   Neurological:      General: No focal deficit present.      Mental Status: He is alert and oriented to person, place, and time.   Psychiatric:         Mood and Affect: Mood normal.         Behavior: Behavior normal.         Judgment: Judgment normal.     - pt in no acute distress on live audio/video chat    ASSESSMENT/PLAN       1. Abnormal CT chest: small area of infiltrate and small nodule-- exam stable. New areas of ground glass/ inflammation - no infectious symptoms at that time   - will get CT chest in 1 year      2. Concern for GENESIS: excessive daytime sleepiness   - referral to sleep medicine at last visit -- declined       Thank you for visiting the Pulmonary clinic today!   Return to clinic 1 year after CT chest or sooner if needed   Jazlyn Blas CNP  My office -  [phone]- Myrtle is my  and Ambika is my nurse.   Radiology scheduling [phone]   Appointment scheduling [phone]   Pulmonary function testing - [phone]

## 2024-05-30 ENCOUNTER — HOSPITAL ENCOUNTER (EMERGENCY)
Facility: HOSPITAL | Age: 54
Discharge: HOME | End: 2024-05-30
Attending: EMERGENCY MEDICINE
Payer: COMMERCIAL

## 2024-05-30 ENCOUNTER — APPOINTMENT (OUTPATIENT)
Dept: RADIOLOGY | Facility: HOSPITAL | Age: 54
End: 2024-05-30
Payer: COMMERCIAL

## 2024-05-30 ENCOUNTER — APPOINTMENT (OUTPATIENT)
Dept: CARDIOLOGY | Facility: HOSPITAL | Age: 54
End: 2024-05-30
Payer: COMMERCIAL

## 2024-05-30 VITALS
WEIGHT: 215 LBS | HEIGHT: 67 IN | TEMPERATURE: 98.1 F | DIASTOLIC BLOOD PRESSURE: 88 MMHG | SYSTOLIC BLOOD PRESSURE: 136 MMHG | OXYGEN SATURATION: 95 % | RESPIRATION RATE: 18 BRPM | HEART RATE: 88 BPM | BODY MASS INDEX: 33.74 KG/M2

## 2024-05-30 DIAGNOSIS — K85.00 IDIOPATHIC ACUTE PANCREATITIS WITHOUT INFECTION OR NECROSIS (HHS-HCC): ICD-10-CM

## 2024-05-30 DIAGNOSIS — K59.00 CONSTIPATION, UNSPECIFIED CONSTIPATION TYPE: Primary | ICD-10-CM

## 2024-05-30 LAB
ALBUMIN SERPL BCP-MCNC: 4.2 G/DL (ref 3.4–5)
ALP SERPL-CCNC: 75 U/L (ref 33–120)
ALT SERPL W P-5'-P-CCNC: 28 U/L (ref 10–52)
ANION GAP SERPL CALC-SCNC: 14 MMOL/L (ref 10–20)
AST SERPL W P-5'-P-CCNC: 14 U/L (ref 9–39)
BASOPHILS # BLD AUTO: 0.07 X10*3/UL (ref 0–0.1)
BASOPHILS NFR BLD AUTO: 0.6 %
BILIRUB SERPL-MCNC: 1.1 MG/DL (ref 0–1.2)
BUN SERPL-MCNC: 13 MG/DL (ref 6–23)
CALCIUM SERPL-MCNC: 9.2 MG/DL (ref 8.6–10.3)
CHLORIDE SERPL-SCNC: 101 MMOL/L (ref 98–107)
CO2 SERPL-SCNC: 25 MMOL/L (ref 21–32)
CREAT SERPL-MCNC: 1.06 MG/DL (ref 0.5–1.3)
EGFRCR SERPLBLD CKD-EPI 2021: 84 ML/MIN/1.73M*2
EOSINOPHIL # BLD AUTO: 0.21 X10*3/UL (ref 0–0.7)
EOSINOPHIL NFR BLD AUTO: 1.7 %
ERYTHROCYTE [DISTWIDTH] IN BLOOD BY AUTOMATED COUNT: 12.9 % (ref 11.5–14.5)
GLUCOSE SERPL-MCNC: 126 MG/DL (ref 74–99)
HCT VFR BLD AUTO: 46 % (ref 41–52)
HGB BLD-MCNC: 15.9 G/DL (ref 13.5–17.5)
IMM GRANULOCYTES # BLD AUTO: 0.08 X10*3/UL (ref 0–0.7)
IMM GRANULOCYTES NFR BLD AUTO: 0.7 % (ref 0–0.9)
LIPASE SERPL-CCNC: 31 U/L (ref 9–82)
LYMPHOCYTES # BLD AUTO: 3.56 X10*3/UL (ref 1.2–4.8)
LYMPHOCYTES NFR BLD AUTO: 29 %
MCH RBC QN AUTO: 29.7 PG (ref 26–34)
MCHC RBC AUTO-ENTMCNC: 34.6 G/DL (ref 32–36)
MCV RBC AUTO: 86 FL (ref 80–100)
MONOCYTES # BLD AUTO: 1.16 X10*3/UL (ref 0.1–1)
MONOCYTES NFR BLD AUTO: 9.5 %
NEUTROPHILS # BLD AUTO: 7.18 X10*3/UL (ref 1.2–7.7)
NEUTROPHILS NFR BLD AUTO: 58.5 %
NRBC BLD-RTO: 0 /100 WBCS (ref 0–0)
PLATELET # BLD AUTO: 286 X10*3/UL (ref 150–450)
POTASSIUM SERPL-SCNC: 4 MMOL/L (ref 3.5–5.3)
PROT SERPL-MCNC: 7.3 G/DL (ref 6.4–8.2)
RBC # BLD AUTO: 5.36 X10*6/UL (ref 4.5–5.9)
SODIUM SERPL-SCNC: 136 MMOL/L (ref 136–145)
WBC # BLD AUTO: 12.3 X10*3/UL (ref 4.4–11.3)

## 2024-05-30 PROCEDURE — 2550000001 HC RX 255 CONTRASTS: Performed by: EMERGENCY MEDICINE

## 2024-05-30 PROCEDURE — 74177 CT ABD & PELVIS W/CONTRAST: CPT | Performed by: RADIOLOGY

## 2024-05-30 PROCEDURE — 80053 COMPREHEN METABOLIC PANEL: CPT | Performed by: STUDENT IN AN ORGANIZED HEALTH CARE EDUCATION/TRAINING PROGRAM

## 2024-05-30 PROCEDURE — 93005 ELECTROCARDIOGRAM TRACING: CPT

## 2024-05-30 PROCEDURE — 74177 CT ABD & PELVIS W/CONTRAST: CPT

## 2024-05-30 PROCEDURE — 36415 COLL VENOUS BLD VENIPUNCTURE: CPT | Performed by: STUDENT IN AN ORGANIZED HEALTH CARE EDUCATION/TRAINING PROGRAM

## 2024-05-30 PROCEDURE — 85025 COMPLETE CBC W/AUTO DIFF WBC: CPT | Performed by: STUDENT IN AN ORGANIZED HEALTH CARE EDUCATION/TRAINING PROGRAM

## 2024-05-30 PROCEDURE — 99285 EMERGENCY DEPT VISIT HI MDM: CPT | Mod: 25

## 2024-05-30 PROCEDURE — 83690 ASSAY OF LIPASE: CPT | Performed by: STUDENT IN AN ORGANIZED HEALTH CARE EDUCATION/TRAINING PROGRAM

## 2024-05-30 RX ORDER — BISACODYL 5 MG
5 TABLET, DELAYED RELEASE (ENTERIC COATED) ORAL 2 TIMES DAILY
Qty: 14 TABLET | Refills: 0 | Status: SHIPPED | OUTPATIENT
Start: 2024-05-30 | End: 2024-06-06

## 2024-05-30 RX ORDER — POLYETHYLENE GLYCOL 3350 17 G/17G
17 POWDER, FOR SOLUTION ORAL DAILY
Qty: 51 G | Refills: 0 | Status: SHIPPED | OUTPATIENT
Start: 2024-05-30 | End: 2024-06-02

## 2024-05-30 RX ADMIN — IOHEXOL 75 ML: 350 INJECTION, SOLUTION INTRAVENOUS at 18:42

## 2024-05-30 ASSESSMENT — PAIN DESCRIPTION - LOCATION: LOCATION: ABDOMEN

## 2024-05-30 ASSESSMENT — PAIN SCALES - GENERAL: PAINLEVEL_OUTOF10: 5 - MODERATE PAIN

## 2024-05-30 ASSESSMENT — PAIN DESCRIPTION - DESCRIPTORS
DESCRIPTORS: ACHING;CRAMPING
DESCRIPTORS: ACHING;CRAMPING

## 2024-05-30 ASSESSMENT — PAIN - FUNCTIONAL ASSESSMENT: PAIN_FUNCTIONAL_ASSESSMENT: 0-10

## 2024-05-30 ASSESSMENT — LIFESTYLE VARIABLES
HAVE PEOPLE ANNOYED YOU BY CRITICIZING YOUR DRINKING: NO
HAVE YOU EVER FELT YOU SHOULD CUT DOWN ON YOUR DRINKING: NO
EVER FELT BAD OR GUILTY ABOUT YOUR DRINKING: NO
EVER HAD A DRINK FIRST THING IN THE MORNING TO STEADY YOUR NERVES TO GET RID OF A HANGOVER: NO
TOTAL SCORE: 0

## 2024-05-30 ASSESSMENT — PAIN DESCRIPTION - PAIN TYPE: TYPE: ACUTE PAIN

## 2024-05-30 NOTE — ED TRIAGE NOTES
Patient c/o abdominal pain with nausea and constipation for the past 2 days. Patient states he took miralax yesterday and was able to have a bowel movement.

## 2024-05-30 NOTE — ED PROVIDER NOTES
CC: Abdominal Pain     HPI:  Patient is a 53-year-old male with past medical history as noted above is presenting to the emergency department with a chief concern of abdominal pain.  Patient states abdominal pain has been present since Tuesday.  Patient reports initial concern for constipation reports that he is attempted MiraLAX and has had small hard stool since then.  Reports that he has no associated chest pain, shortness of breath, slight nausea with no vomiting, no fevers/chills.  Patient reports abdominal pain is similar to when he had his gallbladder surgically removed.  He reports polyuria but denies hematuria, dysuria, denies bloody bowel movements, denies hematemesis, denies melena.    Records Reviewed:  Recent available ED and inpatient notes reviewed in EMR.    PMHx/PSHx:  Per HPI.   - has a past medical history of Atherosclerosis of native coronary artery of native heart without angina pectoris, Chronic gout without tophus, unspecified cause, unspecified site, Hyperlipidemia, Hypothyroidism, unspecified (12/02/2020), Other fatigue (12/02/2020), Personal history of other diseases of the musculoskeletal system and connective tissue (09/24/2019), Personal history of other specified conditions (10/09/2018), and Type 2 diabetes mellitus with diabetic polyneuropathy, without long-term current use of insulin (Multi).  - has a past surgical history that includes Coronary artery bypass graft (01/16/2018); Other surgical history (12/08/2022); Cardiac surgery (2017); Cardiac catheterization (03/2020); and Gallbladder surgery (06/2023).    Medications:  Reviewed in EMR. See EMR for complete list of medications and doses.    Allergies:  Patient has no known allergies.    Social History:  - Tobacco:  reports that he has never smoked. He has never been exposed to tobacco smoke. He has never used smokeless tobacco.   - Alcohol:  reports current alcohol use.   - Illicit Drugs:  reports no history of drug use.     ROS:   Per HPI.       ???????????????????????????????????????????????????????????????  Triage Vitals:  T 36.7 °C (98.1 °F)  HR 90  /77  RR 18  O2 98 % None (Room air)    Physical Exam  Vitals and nursing note reviewed.   Constitutional:       General: He is not in acute distress.     Appearance: Normal appearance. He is not toxic-appearing.   HENT:      Head: Normocephalic and atraumatic.      Nose: No congestion or rhinorrhea.      Mouth/Throat:      Mouth: Mucous membranes are moist.      Pharynx: Oropharynx is clear.   Eyes:      Extraocular Movements: Extraocular movements intact.      Conjunctiva/sclera: Conjunctivae normal.      Pupils: Pupils are equal, round, and reactive to light.   Cardiovascular:      Rate and Rhythm: Normal rate and regular rhythm.      Pulses: Normal pulses.      Heart sounds: No murmur heard.     No friction rub. No gallop.   Pulmonary:      Effort: Pulmonary effort is normal.      Breath sounds: Normal breath sounds. No wheezing, rhonchi or rales.   Abdominal:      General: There is no distension.      Palpations: Abdomen is soft.      Tenderness: There is generalized abdominal tenderness. There is no guarding or rebound.   Musculoskeletal:         General: No swelling, deformity or signs of injury. Normal range of motion.      Right lower leg: No edema.      Left lower leg: No edema.   Skin:     General: Skin is warm.      Findings: No bruising, lesion or rash.   Neurological:      General: No focal deficit present.      Mental Status: He is alert and oriented to person, place, and time. Mental status is at baseline.      Cranial Nerves: No cranial nerve deficit.      Sensory: No sensory deficit.      Coordination: Coordination normal.   Psychiatric:         Mood and Affect: Mood normal.         Thought Content: Thought content normal.         Judgment: Judgment normal.       ???????????????????????????????????????????????????????????????  EKG:  EKG is obtained at 1735 is noted to  be normal sinus rhythm with a ventricular rate of 94 bpm, parable 144, QRS duration of 82, QTc of 445 there is no significant T wave version, no sign of ST elevation it is compared to prior EKG from November 7, 2023 with no significant changes appreciated.    Assessment and Plan:  Patient is a 53-year-old male with past medical history as noted above is presenting to the emergency department with a chief concern of abdominal pain.  Patient states abdominal pain has been present since Tuesday.  Patient reports initial concern for constipation reports that he is attempted MiraLAX and has had small hard stool since then.  Reports that he has no associated chest pain, shortness of breath, slight nausea with no vomiting, no fevers/chills.  Patient reports abdominal pain is similar to when he had his gallbladder surgically removed.  He reports polyuria but denies hematuria, dysuria, denies bloody bowel movements, denies hematemesis, denies melena.    Given patient's significant pain will obtain abdominal labs, CT imaging to evaluate for potential intra-abdominal infection, low concern for abdominal aortic aneurysm however this is also on the differential diagnosis and will be evaluated for with contrasted CT.  Patient was without significant chest pain, without significant radiation towards the back and therefore I have a low concern for thoracic pathology.    ED Course:  Diagnoses as of 06/01/24 1007   Constipation, unspecified constipation type   Idiopathic acute pancreatitis without infection or necrosis ([facility])      Patient a 53-year-old male past medical history as noted above presented emergency room with generalized abdominal pain.  Workup is most notable for CT evidence of pancreatitis with generalized abdominal pain on physical examination and nonelevated lipase.  Patient has no history of drug or alcohol use, is not a smoker.  Patient is currently on Trulicity.  Patient was advised to discuss use of Trulicity  with his primary care physician in light of these findings.  Patient is currently tolerating p.o. intake, reports ability to take anti-inflammatory medications at home.  Given the patient is tolerating p.o. intake, tolerating pain admission at this point would be of limited utility, patient is advised return to the emergency department if there is any severe worsening of pain and follow-up with his primary care physician and GI regarding recommendations for potential idiopathic pancreatitis, modification of diabetic regimen.  Patient is agreeable with plan for discharge and is discharged in stable condition.    Social Determinants Limiting Care:      Disposition:  Discharge    Ajay Yanez MD       Procedures ? [employer] last updated 5/30/2024 5:59 PM        Ajay Yanez MD  Resident  06/01/24 1017

## 2024-05-31 ENCOUNTER — OFFICE VISIT (OUTPATIENT)
Dept: GASTROENTEROLOGY | Facility: CLINIC | Age: 54
End: 2024-05-31
Payer: COMMERCIAL

## 2024-05-31 VITALS — BODY MASS INDEX: 33.74 KG/M2 | HEIGHT: 67 IN | HEART RATE: 94 BPM | OXYGEN SATURATION: 95 % | WEIGHT: 215 LBS

## 2024-05-31 DIAGNOSIS — K85.80 OTHER ACUTE PANCREATITIS, UNSPECIFIED COMPLICATION STATUS (HHS-HCC): Primary | ICD-10-CM

## 2024-05-31 PROBLEM — K85.90 ACUTE PANCREATITIS (HHS-HCC): Status: ACTIVE | Noted: 2024-05-31

## 2024-05-31 PROCEDURE — 99203 OFFICE O/P NEW LOW 30 MIN: CPT

## 2024-05-31 PROCEDURE — 4010F ACE/ARB THERAPY RXD/TAKEN: CPT

## 2024-05-31 PROCEDURE — 1036F TOBACCO NON-USER: CPT

## 2024-05-31 RX ORDER — IBUPROFEN 600 MG/1
600 TABLET ORAL EVERY 8 HOURS PRN
Qty: 20 TABLET | Refills: 0 | Status: SHIPPED | OUTPATIENT
Start: 2024-05-31 | End: 2024-06-10

## 2024-05-31 RX ORDER — ACETAMINOPHEN 500 MG
500 TABLET ORAL EVERY 6 HOURS PRN
Qty: 30 TABLET | Refills: 0 | Status: SHIPPED | OUTPATIENT
Start: 2024-05-31 | End: 2024-06-10

## 2024-05-31 ASSESSMENT — ENCOUNTER SYMPTOMS
SHORTNESS OF BREATH: 0
RECTAL PAIN: 0
ABDOMINAL PAIN: 1
ABDOMINAL DISTENTION: 0
ANAL BLEEDING: 0
COUGH: 0
FEVER: 0
DIARRHEA: 0
CHILLS: 0
APPETITE CHANGE: 0
TROUBLE SWALLOWING: 0
BLOOD IN STOOL: 0
VOMITING: 0
FATIGUE: 0
CONSTIPATION: 0
NAUSEA: 0

## 2024-05-31 NOTE — PROGRESS NOTES
Subjective     History of Present Illness:   Arnaldo Balderrama is a 53 y.o. male with PMHx of CHF, fatty liver, CAD on Plavix, s/p CABG, HTN, DM2, cardiomyopathy, GERD who presents to GI clinic for further evaluation pancreatitis    Today, patient states starting Tuesday felt constipated and stomach started hurting.  Started Miralax, which seemed to have started working yesterday and today has fecal urgency.  Went to ED and has been rotating Tylenol and ibuprofen for pain control.  States he was supposed to get IV fluids in the ED and they never gave it to him.  Pain is in epigastric region moving to low abdomen. Normally moves bowels every few days.  Does not drink water.  Only drinks diet mountain dew.  Is very sensitive with the taste of drinks and Mountain Dew is the only thing he can tolerate.  States he works a lot and is unable to take a break.  Patient went to ED yesterday for abdominal pain, CT suggested acute interstitial edematous pancreatitis and umbilical hernia, lipase was normal.  Denies diarrhea, dyspepsia, melena, hematochezia, dysphagia, unintentional weight loss    Denies ETOH, smoking, marijuana  Denies fxh GI cancer or IBD  Abdominal Surgeries: Cholecystectomy    Denies history of colonoscopy   Denies history of EGD       Past Medical History  As per HPI.     Social History  he  reports that he has never smoked. He has never been exposed to tobacco smoke. He has never used smokeless tobacco. He reports current alcohol use. He reports that he does not use drugs.     Family History  his family history includes Diabetes in his father; Heart disease in his father; Hypertension in his sibling; Thyroid disease in his father and mother.     Review of Systems  Review of Systems   Constitutional:  Negative for appetite change, chills, fatigue and fever.   HENT:  Negative for trouble swallowing.    Respiratory:  Negative for cough and shortness of breath.    Gastrointestinal:  Positive for abdominal pain  (Generalized, worse in epigastric region). Negative for abdominal distention, anal bleeding, blood in stool, constipation, diarrhea, nausea, rectal pain and vomiting.       Allergies  No Known Allergies    Medications  Current Outpatient Medications   Medication Instructions    acetaminophen (TYLENOL EXTRA STRENGTH) 500 mg, oral, Every 6 hours PRN    aspirin 81 mg EC tablet 1 tablet, oral, Daily    atorvastatin (LIPITOR) 40 mg, oral, Daily    bisacodyl (DULCOLAX) 5 mg, oral, 2 times daily, Do not crush, chew, or split.    cholecalciferol (Vitamin D3) 50 mcg (2,000 unit) capsule TAKE ONE CAPSULE BY MOUTH DAILY    clopidogrel (PLAVIX) 75 mg, oral, Daily    dulaglutide (Trulicity) 3 mg/0.5 mL pen injector USE AS DIRECTED. INJECT 3mg SUBCUTANEOUSLY WEEKLY    ezetimibe (Zetia) 10 mg tablet     ibuprofen 600 mg, oral, Every 8 hours PRN    isosorbide mononitrate ER (IMDUR) 30 mg, oral, Nightly    Jardiance 25 mg 1 tablet, oral, Daily    lisinopril 20 mg, oral, Daily    metFORMIN (Glucophage) 500 mg tablet take 2 tablets by mouth (1000mg) by mouth 2 times a day with meals.    omeprazole (PriLOSEC) 20 mg DR capsule oral    polyethylene glycol (GLYCOLAX, MIRALAX) 17 g, oral, Daily        Objective   Visit Vitals  Pulse 94      Physical Exam  Constitutional:       Appearance: Normal appearance. He is obese.   HENT:      Mouth/Throat:      Mouth: Mucous membranes are dry.      Pharynx: Oropharynx is clear.   Cardiovascular:      Rate and Rhythm: Normal rate and regular rhythm.   Pulmonary:      Effort: Pulmonary effort is normal.      Breath sounds: Normal breath sounds. No wheezing or rhonchi.   Abdominal:      General: Abdomen is flat. Bowel sounds are normal. There is no distension.      Palpations: Abdomen is soft. There is no hepatomegaly.      Tenderness: There is abdominal tenderness (Epigastric, radiating to entire abdomen). There is guarding and rebound. Negative signs include Patel's sign.      Hernia: No hernia is  present.   Musculoskeletal:         General: Normal range of motion.   Skin:     General: Skin is warm and dry.   Neurological:      General: No focal deficit present.      Mental Status: He is alert and oriented to person, place, and time.   Psychiatric:         Mood and Affect: Mood normal.         Behavior: Behavior normal.           Lab Results   Component Value Date    WBC 12.3 (H) 05/30/2024    WBC 9.8 12/29/2023    WBC 9.1 06/12/2023    HGB 15.9 05/30/2024    HGB 15.6 12/29/2023    HGB 15.7 06/12/2023    HCT 46.0 05/30/2024    HCT 46.1 12/29/2023    HCT 47.4 06/12/2023     05/30/2024     12/29/2023     06/12/2023     Lab Results   Component Value Date     05/30/2024     12/29/2023     06/12/2023    K 4.0 05/30/2024    K 4.7 12/29/2023    K 4.5 06/12/2023     05/30/2024     12/29/2023     06/12/2023    CO2 25 05/30/2024    CO2 26 12/29/2023    CO2 22 (L) 06/12/2023    BUN 13 05/30/2024    BUN 15 12/29/2023    BUN 10 06/12/2023    CREATININE 1.06 05/30/2024    CREATININE 0.90 12/29/2023    CREATININE 0.8 06/12/2023    CALCIUM 9.2 05/30/2024    CALCIUM 9.4 12/29/2023    CALCIUM 9.6 06/12/2023    PROT 7.3 05/30/2024    PROT 6.6 12/29/2023    PROT 7.0 06/12/2023    BILITOT 1.1 05/30/2024    BILITOT 0.9 12/29/2023    BILITOT 0.3 06/12/2023    ALKPHOS 75 05/30/2024    ALKPHOS 79 12/29/2023    ALKPHOS 77 06/12/2023    ALT 28 05/30/2024    ALT 45 (H) 12/29/2023    ALT 54 (H) 06/12/2023    AST 14 05/30/2024    AST 31 12/29/2023    AST 34 06/12/2023    GLUCOSE 126 (H) 05/30/2024    GLUCOSE 91 12/29/2023    GLUCOSE 132 (H) 06/12/2023           Arnaldo Balderrama is a 53 y.o. male who presents to GI clinic for acute pancreatitis.    Acute pancreatitis ([facility]-HCC)  CT scan confirms mild pancreatitis.  Likely medication induced from atorvastatin, possibly diabetes or Trulicity.  Consider idiopathic pancreatitis  -Recommend pain control with Tylenol and ibuprofen,  significantly increased hydration  -Clear liquid diet advance as tolerated  -Recommended patient go to ED if symptoms worsen  -Patient has appointment with endocrinology and PCP.  PCP notified we stopped atorvastatin.  Follow-up as needed         Madalyn Melendrez APRN-CNP

## 2024-05-31 NOTE — ASSESSMENT & PLAN NOTE
CT scan confirms mild pancreatitis.  Likely medication induced from atorvastatin, possibly diabetes or Trulicity.  Consider idiopathic pancreatitis  -Recommend pain control with Tylenol and ibuprofen, significantly increased hydration  -Clear liquid diet advance as tolerated  -Recommended patient go to ED if symptoms worsen  -Patient has appointment with endocrinology and PCP.  PCP notified we stopped atorvastatin.  Follow-up as needed

## 2024-05-31 NOTE — PATIENT INSTRUCTIONS
I recommend you stop your atorvastatin because this can cause pancreatitis.  You may also stop your Trulicity for 1 week and speak to your endocrinologist about this possibly causing it.  Clear liquid diet for a few days then advance as tolerated.  I recommend you hydrate with water.  Continue your as needed ibuprofen and tylenol alternating every 4 hours  I recommend taking 1 capful of Miralax daily in 8 oz of liquid.  This is to help move bowels and soften stool.  If your symptoms worsen, go back to ED.  Follow up as needed

## 2024-06-01 LAB
ATRIAL RATE: 94 BPM
P AXIS: -20 DEGREES
P OFFSET: 202 MS
P ONSET: 154 MS
PR INTERVAL: 144 MS
Q ONSET: 226 MS
QRS COUNT: 16 BEATS
QRS DURATION: 82 MS
QT INTERVAL: 356 MS
QTC CALCULATION(BAZETT): 445 MS
QTC FREDERICIA: 413 MS
R AXIS: 12 DEGREES
T AXIS: 142 DEGREES
T OFFSET: 404 MS
VENTRICULAR RATE: 94 BPM

## 2024-06-19 ENCOUNTER — APPOINTMENT (OUTPATIENT)
Dept: PRIMARY CARE | Facility: CLINIC | Age: 54
End: 2024-06-19
Payer: COMMERCIAL

## 2024-06-19 VITALS
SYSTOLIC BLOOD PRESSURE: 126 MMHG | WEIGHT: 214 LBS | BODY MASS INDEX: 33.59 KG/M2 | OXYGEN SATURATION: 96 % | HEART RATE: 83 BPM | HEIGHT: 67 IN | DIASTOLIC BLOOD PRESSURE: 82 MMHG

## 2024-06-19 DIAGNOSIS — I10 ESSENTIAL HYPERTENSION, BENIGN: ICD-10-CM

## 2024-06-19 DIAGNOSIS — E11.42 TYPE 2 DIABETES MELLITUS WITH DIABETIC POLYNEUROPATHY, WITHOUT LONG-TERM CURRENT USE OF INSULIN (MULTI): ICD-10-CM

## 2024-06-19 DIAGNOSIS — Z00.00 ANNUAL PHYSICAL EXAM: Primary | ICD-10-CM

## 2024-06-19 DIAGNOSIS — I50.42 CHRONIC COMBINED SYSTOLIC AND DIASTOLIC CONGESTIVE HEART FAILURE (MULTI): ICD-10-CM

## 2024-06-19 DIAGNOSIS — E66.09 CLASS 1 OBESITY DUE TO EXCESS CALORIES WITH SERIOUS COMORBIDITY AND BODY MASS INDEX (BMI) OF 33.0 TO 33.9 IN ADULT: ICD-10-CM

## 2024-06-19 DIAGNOSIS — R91.1 LUNG NODULE: ICD-10-CM

## 2024-06-19 PROBLEM — K85.90 ACUTE PANCREATITIS (HHS-HCC): Status: RESOLVED | Noted: 2024-05-31 | Resolved: 2024-06-19

## 2024-06-19 PROBLEM — R73.9 HYPERGLYCEMIA: Status: RESOLVED | Noted: 2023-09-03 | Resolved: 2024-06-19

## 2024-06-19 PROBLEM — R57.0 CARDIOGENIC SHOCK (MULTI): Status: RESOLVED | Noted: 2023-09-03 | Resolved: 2024-06-19

## 2024-06-19 PROBLEM — D68.9 COAGULOPATHY (MULTI): Status: RESOLVED | Noted: 2023-09-03 | Resolved: 2024-06-19

## 2024-06-19 PROBLEM — Z79.4 LONG TERM (CURRENT) USE OF INSULIN (MULTI): Status: RESOLVED | Noted: 2023-09-03 | Resolved: 2024-06-19

## 2024-06-19 PROBLEM — E66.8 OTHER OBESITY: Status: RESOLVED | Noted: 2023-09-03 | Resolved: 2024-06-19

## 2024-06-19 PROBLEM — K81.0 ACUTE CHOLECYSTITIS: Status: RESOLVED | Noted: 2023-09-03 | Resolved: 2024-06-19

## 2024-06-19 PROBLEM — R13.10 DYSPHAGIA: Status: RESOLVED | Noted: 2023-09-03 | Resolved: 2024-06-19

## 2024-06-19 PROBLEM — D62 ACUTE BLOOD LOSS AS CAUSE OF POSTOPERATIVE ANEMIA: Status: RESOLVED | Noted: 2023-09-03 | Resolved: 2024-06-19

## 2024-06-19 PROBLEM — E66.89 OTHER OBESITY: Status: RESOLVED | Noted: 2023-09-03 | Resolved: 2024-06-19

## 2024-06-19 PROBLEM — E66.811 CLASS 1 OBESITY DUE TO EXCESS CALORIES WITH SERIOUS COMORBIDITY AND BODY MASS INDEX (BMI) OF 33.0 TO 33.9 IN ADULT: Status: ACTIVE | Noted: 2024-06-19

## 2024-06-19 PROBLEM — E87.70 HYPERVOLEMIA: Status: RESOLVED | Noted: 2023-09-03 | Resolved: 2024-06-19

## 2024-06-19 PROCEDURE — G0447 BEHAVIOR COUNSEL OBESITY 15M: HCPCS | Performed by: NURSE PRACTITIONER

## 2024-06-19 PROCEDURE — 3079F DIAST BP 80-89 MM HG: CPT | Performed by: NURSE PRACTITIONER

## 2024-06-19 PROCEDURE — 99213 OFFICE O/P EST LOW 20 MIN: CPT | Performed by: NURSE PRACTITIONER

## 2024-06-19 PROCEDURE — 99396 PREV VISIT EST AGE 40-64: CPT | Performed by: NURSE PRACTITIONER

## 2024-06-19 PROCEDURE — 3074F SYST BP LT 130 MM HG: CPT | Performed by: NURSE PRACTITIONER

## 2024-06-19 PROCEDURE — 3008F BODY MASS INDEX DOCD: CPT | Performed by: NURSE PRACTITIONER

## 2024-06-19 PROCEDURE — 4010F ACE/ARB THERAPY RXD/TAKEN: CPT | Performed by: NURSE PRACTITIONER

## 2024-06-19 ASSESSMENT — ENCOUNTER SYMPTOMS
DEPRESSION: 0
OCCASIONAL FEELINGS OF UNSTEADINESS: 0
LOSS OF SENSATION IN FEET: 0

## 2024-06-19 NOTE — PATIENT INSTRUCTIONS
Thank you for seeing me today.  It was a pleasure to see you again!      Today we did your Annual Physical Exam and discussed the following:     Continue medications as we reviewed in office today    See Endo next week, your A1C is due    For assistance with scheduling referrals or consultations, please call [phone] or [phone].    For laboratory, radiology, and other tests, please call [phone] ([phone] for pediatrics).   If you do not get results within 7-10 days, or you have any questions or concerns, please send a message, call the office ([phone]), or return to the office for a follow-up appointment.     For acute/sick visits, if you are unable to get an office visit, you can do a  On Demand Virtual Visit that is accessible via your My Chart account.  For emergencies, call 9-1-1 or go to the nearest Emergency Department.     Please schedule additional appointment(s) to address concern(s) not addressed today.    Please review prescription inserts and published information for possible adverse effects of all medications.     In general, results are discussed over the phone or via  [employer].     You can see your health information, review clinical summaries from office visits & test results online when you follow your health with MY  Chart, a personal health record.   To sign up go to [email or web address].   If you need assistance with signing up or trouble getting into your account call [facility] Patient Line 24/7 at [phone]         Ways to Help Prevent Falls at Home    Quick Tips   ? Ask for help if you need it. Most people want to help!   ? Get up slowly after sitting or laying down   ? Wear a medical alert device or keep cell phone in your pocket   ? Use night lights, especially areas near a bathroom   ? Keep the items you use often within reach on a small stool or end table   ? Use an assistive device such as walker or cane, as directed by provider/physical therapy   ?  Use a non-slip mat and grab bars in your bathroom. Look for home health sections for best options     Other Areas to Focus On   ? Exercise and nutrition: Regular exercise or taking a falls prevention class are great ways improve strength and balance. Don’t forget to stay hydrated and bring a snack!   ? Medicine side effects: Some medicines can make you sleepy or dizzy, which could cause a fall. Ask your healthcare provider about the side effects your medicines could cause. Be sure to let them know if you take any vitamins or supplements as well.   ? Tripping hazards: Remove items you could trip on, such as loose mats, rugs, cords, and clutter. Wear closed toe shoes with rubber soles.   ? Health and wellness: Get regular checkups with your healthcare provider, plus routine vision and hearing screenings. Talk with your healthcare provider about:   o Your medicines and the possible side effects - bring them in a bag if that is easier!   o Problems with balance or feeling dizzy   o Ways to promote bone health, such as Vitamin D and calcium supplements   o Questions or concerns about falling     *Ask your healthcare team if you have questions     [facility], 2022    RTC 6 MONTHS

## 2024-06-19 NOTE — ASSESSMENT & PLAN NOTE
cardiologist Dr. Schmidt/Juliano; LOV Nov 2022  stable on Plavix, ASA, statin, Isosorbide  had heart cath in Feb 2020---> clear

## 2024-06-19 NOTE — ASSESSMENT & PLAN NOTE
"seeing Dr. Barr, urology  started testosterone injections ---> Testosterone= 249 Sept 2022  Testosterone d/c'd in June 2022 per Dr. Montiel, Endo b/c \"level was normal\"  No concerns per pt.  "

## 2024-06-19 NOTE — PROGRESS NOTES
"Subjective   Reason for Visit: Arnaldo Balderrama is an 53 y.o. male here for a CPE     Chief Complaint   Patient presents with    Annual Exam     Arnaldo Balderrama is a 53 y.o. male is here today for a CPE. Patient reports no questions or concerns at this time. /98       HPI  Arnaldo is a 54 yo male presenting today for Annual CPE and 6 month f/u on HTN   LOV: 12/27/2023    Dx: CAD s/p CABG X 4 (2017), HTN, HLD,T2DM, GERD    Pt was in ED on 5/30/24 for pancreatitis  Diabetes meds held x 2 weeks, then pt resumed  Feeling better, no abdominal pain  CT reviewed w/pt  Has appt w/Endo this week     Health Maintenance Due   Topic Date Due    HIV Screening  Never done    MMR Vaccines (1 of 1 - Standard series) Never done    Pneumococcal Vaccine: Pediatrics (0 to 5 Years) and At-Risk Patients (6 to 64 Years) (1 of 2 - PCV) Never done    Hepatitis C Screening  Never done    Hepatitis B Vaccines (1 of 3 - 19+ 3-dose series) Never done    Echocardiogram  04/07/2023    Diabetes: Foot Exam  02/22/2024    Diabetes: Hemoglobin A1C  03/21/2024       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 Vitamin D3 and B12    Are you taking aspirin daily? Yes     Colon cancer screening: UTD  PSA: 2022  Fasting blood work: UTD  Last eye exam: 2023  Last dental Exam: years   Exercise: not regularly  Mood: no concerns   Sleep: waking up several times at night, not to pee  Vaccines: UTD    #HYPOGONADISM   seeing Dr. Barr, urology  started testosterone injections ---> Testosterone= 249 Sept 2022  Testosterone d/c'd in June 2022 per Dr. Montiel, Endo b/c \"level was normal\"     Pt had CT chest done in May for RUL nodule  no change @ 11 mm   LDCT due Dec 2024     #CAD s/p CABG  cardiologist Dr. Schmidt/Juliano; LOV Nov 2022  stable on Plavix, ASA, statin, Isosorbide  had heart cath " in Feb 2020---> clear     #HTN  Rx lisinopril 20 mg daily  BP today: 126/82  NONSMOKER      #HLD  Rx Atorvastatin  FLP done Dec 2023     #T2DM  Dx 2002  sees Dr. Augustin  Rx Trulicity, Jardiance, Metformin  7.2%  Dec 2023---> due, has appt Friday w/Endo  last eye exam: 2023 (Pt had cataract surgery this month)  neuropathy: b/l feet, does not take medication   Pt was in ED in May for pancreatitis   Rare ETOH use, 2/2 medications ---> stopped Atorvastatin and Trulicity x 2 weeks, but has resumed      #HYPOTHYROIDISM  Off levothyroxine since Aug 2020  TSH = 2.97 Dec 2023     #GERD  Taking Prilosec OTC  sx controlled      #HM  COLON: UTD  PSA: 2022    No Known Allergies    Admission on 05/30/2024, Discharged on 05/30/2024   Component Date Value Ref Range Status    WBC 05/30/2024 12.3 (H)  4.4 - 11.3 x10*3/uL Final    nRBC 05/30/2024 0.0  0.0 - 0.0 /100 WBCs Final    RBC 05/30/2024 5.36  4.50 - 5.90 x10*6/uL Final    Hemoglobin 05/30/2024 15.9  13.5 - 17.5 g/dL Final    Hematocrit 05/30/2024 46.0  41.0 - 52.0 % Final    MCV 05/30/2024 86  80 - 100 fL Final    MCH 05/30/2024 29.7  26.0 - 34.0 pg Final    MCHC 05/30/2024 34.6  32.0 - 36.0 g/dL Final    RDW 05/30/2024 12.9  11.5 - 14.5 % Final    Platelets 05/30/2024 286  150 - 450 x10*3/uL Final    Neutrophils % 05/30/2024 58.5  40.0 - 80.0 % Final    Immature Granulocytes %, Automated 05/30/2024 0.7  0.0 - 0.9 % Final    Immature Granulocyte Count (IG) includes promyelocytes, myelocytes and metamyelocytes but does not include bands. Percent differential counts (%) should be interpreted in the context of the absolute cell counts (cells/UL).    Lymphocytes % 05/30/2024 29.0  13.0 - 44.0 % Final    Monocytes % 05/30/2024 9.5  2.0 - 10.0 % Final    Eosinophils % 05/30/2024 1.7  0.0 - 6.0 % Final    Basophils % 05/30/2024 0.6  0.0 - 2.0 % Final    Neutrophils Absolute 05/30/2024 7.18  1.20 - 7.70 x10*3/uL Final    Percent differential counts (%) should be interpreted in the  context of the absolute cell counts (cells/uL).    Immature Granulocytes Absolute, Au* 05/30/2024 0.08  0.00 - 0.70 x10*3/uL Final    Lymphocytes Absolute 05/30/2024 3.56  1.20 - 4.80 x10*3/uL Final    Monocytes Absolute 05/30/2024 1.16 (H)  0.10 - 1.00 x10*3/uL Final    Eosinophils Absolute 05/30/2024 0.21  0.00 - 0.70 x10*3/uL Final    Basophils Absolute 05/30/2024 0.07  0.00 - 0.10 x10*3/uL Final    Glucose 05/30/2024 126 (H)  74 - 99 mg/dL Final    Sodium 05/30/2024 136  136 - 145 mmol/L Final    Potassium 05/30/2024 4.0  3.5 - 5.3 mmol/L Final    Chloride 05/30/2024 101  98 - 107 mmol/L Final    Bicarbonate 05/30/2024 25  21 - 32 mmol/L Final    Anion Gap 05/30/2024 14  10 - 20 mmol/L Final    Urea Nitrogen 05/30/2024 13  6 - 23 mg/dL Final    Creatinine 05/30/2024 1.06  0.50 - 1.30 mg/dL Final    eGFR 05/30/2024 84  >60 mL/min/1.73m*2 Final    Calculations of estimated GFR are performed using the 2021 CKD-EPI Study Refit equation without the race variable for the IDMS-Traceable creatinine methods.  https://jasn.asnjournals.org/content/early/2021/09/22/ASN.8485193196    Calcium 05/30/2024 9.2  8.6 - 10.3 mg/dL Final    Albumin 05/30/2024 4.2  3.4 - 5.0 g/dL Final    Alkaline Phosphatase 05/30/2024 75  33 - 120 U/L Final    Total Protein 05/30/2024 7.3  6.4 - 8.2 g/dL Final    AST 05/30/2024 14  9 - 39 U/L Final    Bilirubin, Total 05/30/2024 1.1  0.0 - 1.2 mg/dL Final    ALT 05/30/2024 28  10 - 52 U/L Final    Patients treated with Sulfasalazine may generate falsely decreased results for ALT.    Lipase 05/30/2024 31  9 - 82 U/L Final    Ventricular Rate 05/30/2024 94  BPM Final    Atrial Rate 05/30/2024 94  BPM Final    MS Interval 05/30/2024 144  ms Final    QRS Duration 05/30/2024 82  ms Final    QT Interval 05/30/2024 356  ms Final    QTC Calculation(Bazett) 05/30/2024 445  ms Final    P Axis 05/30/2024 -20  degrees Final    R Axis 05/30/2024 12  degrees Final    T Axis 05/30/2024 142  degrees Final    QRS  "Count 05/30/2024 16  beats Final    Q Onset 05/30/2024 226  ms Final    P Onset 05/30/2024 154  ms Final    P Offset 05/30/2024 202  ms Final    T Offset 05/30/2024 404  ms Final    QTC Fredericia 05/30/2024 413  ms Final         Patient Care Team:  NATHAN Yousif as PCP - General (Family Medicine)  NATHAN Yousif as PCP - [facility] PCP  NATHAN Yousif     Review of Systems  ROS was completed and all systems are negative with the exception of what was noted in the the HPI.       Objective   Vitals:  /82   Pulse 83   Ht 1.702 m (5' 7\")   Wt 97.1 kg (214 lb)   SpO2 96%   BMI 33.52 kg/m²       Current Outpatient Medications   Medication Instructions    aspirin 81 mg EC tablet 1 tablet, oral, Daily    atorvastatin (LIPITOR) 40 mg, oral, Daily    cholecalciferol (Vitamin D3) 50 mcg (2,000 unit) capsule TAKE ONE CAPSULE BY MOUTH DAILY    clopidogrel (PLAVIX) 75 mg, oral, Daily    dulaglutide (Trulicity) 3 mg/0.5 mL pen injector USE AS DIRECTED. INJECT 3mg SUBCUTANEOUSLY WEEKLY    ezetimibe (Zetia) 10 mg tablet     isosorbide mononitrate ER (IMDUR) 30 mg, oral, Nightly    Jardiance 25 mg 1 tablet, oral, Daily    lisinopril 20 mg, oral, Daily    metFORMIN (Glucophage) 500 mg tablet take 2 tablets by mouth (1000mg) by mouth 2 times a day with meals.    omeprazole (PriLOSEC) 20 mg DR capsule oral     CT abdomen pelvis w IV contrast 05/30/2024    Narrative  Interpreted By:  Jeffery Diallo,  STUDY:  CT ABDOMEN PELVIS W IV CONTRAST;  5/30/2024 6:41 pm    INDICATION:  Signs/Symptoms:Right upper quadrant to bilateral lower quadrant  abdominal pain.    COMPARISON:  06/07/2023    ACCESSION NUMBER(S):  [id number]    ORDERING CLINICIAN:  FLOWER SULTANA    TECHNIQUE:  Axial CT images of the abdomen and pelvis with coronal and sagittal  reconstructed images obtained. Intravenous contrast was administered,  75 mL Omnipaque 350.    FINDINGS:  LOWER CHEST: There is mild subsegmental " atelectasis/scarring.  Calcified granuloma within the medial left lower lobe.    LIVER: Within normal limits.    BILE DUCTS: Normal caliber.    GALLBLADDER: Cholecystectomy.    PANCREAS: There is mild fat stranding about the pancreatic head.  Normal pancreatic enhancement. No peripancreatic fluid collection.    SPLEEN: Within normal limits.    ADRENALS: Within normal limits.    KIDNEYS, URETERS, and BLADDER: There is mild bilateral perinephric  fat stranding. No hydronephrosis or renal calculi. Ureters are  non-dilated.  Urinary bladder within normal limits.    REPRODUCTIVE: No pelvic masses.    VESSELS: Moderate atherosclerosis. No abdominal aortic aneurysm.    RETROPERITONEUM and LYMPH NODES: No lymphadenopathy.    BOWEL: The stomach is unremarkable. Small bowel is non-dilated.  Normal appendix. Large bowel is normal.    PERITONEUM: No ascites or free air, no fluid collection.    BODY WALL: Small fat containing periumbilical hernia with mild  surrounding fat stranding which appears similar to 06/07/2023. Small  bilateral fat containing inguinal hernias.    MUSCULOSKELETAL: Subacute fractures of the bilateral anterolateral  6th ribs. Moderate multilevel spinal degenerative change. Bilateral  L5 pars interarticularis defects and unchanged grade 2  anterolisthesis of L5 on S1 measuring 1.5 cm.    Impression  1. Mild fat stranding about the pancreatic head concerning for acute  interstitial edematous pancreatitis. No fluid collection or  pancreatic hypoenhancement to suggest necrosis.  2. Small fat containing periumbilical hernia with mild surrounding  fat stranding/inflammation which appears similar to 06/07/2023.  Please correlate for focal point tenderness.  3. Bilateral L5 pars interarticularis defects and unchanged grade 2  anterolisthesis of L5 on S1 measuring 1.5 cm.    Signed by: Jeffery Diallo 5/30/2024 7:02 PM  Dictation workstation:   [id number]        Physical Exam  Vitals reviewed.   HENT:       Mouth/Throat:      Mouth: Mucous membranes are moist.   Eyes:      Conjunctiva/sclera: Conjunctivae normal.   Cardiovascular:      Pulses: Normal pulses.      Heart sounds: Normal heart sounds.   Pulmonary:      Effort: Pulmonary effort is normal.      Breath sounds: Normal breath sounds.   Abdominal:      General: Bowel sounds are normal.   Skin:     General: Skin is warm and dry.   Neurological:      Mental Status: He is alert and oriented to person, place, and time.   Psychiatric:         Mood and Affect: Mood normal.         Assessment/Plan   Problem List Items Addressed This Visit             ICD-10-CM    Chronic combined systolic and diastolic congestive heart failure (Multi) I50.42     cardiologist Dr. Schmidt/Juliano; LOV Nov 2022  stable on Plavix, ASA, statin, Isosorbide  had heart cath in Feb 2020---> clear         Essential hypertension, benign I10     Rx lisinopril 20 mg daily  BP today: 126/82  NONSMOKER          Type 2 diabetes mellitus with diabetic polyneuropathy, without long-term current use of insulin (Multi) E11.42     Dx 2002  sees Dr. Augustin  Rx Trulicity, Jardiance, Metformin  7.2%  Dec 2023---> due, has appt next week w/Endo  last eye exam: 2023 (Pt had cataract surgery this month)  neuropathy: b/l feet, does not take medication         Lung nodule R91.1     Pt had CT chest done in May for RUL nodule  no change @ 11 mm   LDCT due Dec 2024         Annual physical exam - Primary Z00.00     - Counseled on healthy diet and regular exercise  - Fall avoidance information provided  - Personalized prevention plan provided   - Colon UTD  - Vaccines UTD            Class 1 obesity due to excess calories with serious comorbidity and body mass index (BMI) of 33.0 to 33.9 in adult E66.09, Z68.33     In a face to face session, I informed patient of his/her BMI > 30.  We discussed appropriate nutrition choices and exercise plan to help achieve weight reduction.   Aim for 60 gm of Protein daily  Drink  60 oz of Water daily  Exercise 60 min EVERYDAY                Patient was identified as a fall risk. Risk prevention instructions provided.    Minutes spent on counseling patient on weight loss: 10

## 2024-06-19 NOTE — ASSESSMENT & PLAN NOTE
- Counseled on healthy diet and regular exercise  - Fall avoidance information provided  - Personalized prevention plan provided   - Colon UTD  - Vaccines UTD

## 2024-06-19 NOTE — ASSESSMENT & PLAN NOTE
Dx 2002  sees Dr. Augustin  Rx Anuradha Mcneill, Metformin  7.2%  Dec 2023---> due, has appt next week w/Endo  last eye exam: 2023 (Pt had cataract surgery this month)  neuropathy: b/l feet, does not take medication

## 2024-06-21 ENCOUNTER — APPOINTMENT (OUTPATIENT)
Dept: ENDOCRINOLOGY | Facility: CLINIC | Age: 54
End: 2024-06-21
Payer: COMMERCIAL

## 2024-06-21 DIAGNOSIS — E11.42 TYPE 2 DIABETES MELLITUS WITH DIABETIC POLYNEUROPATHY, WITHOUT LONG-TERM CURRENT USE OF INSULIN (MULTI): Primary | ICD-10-CM

## 2024-06-21 DIAGNOSIS — I10 ESSENTIAL HYPERTENSION, BENIGN: ICD-10-CM

## 2024-06-21 DIAGNOSIS — E78.5 DYSLIPIDEMIA: ICD-10-CM

## 2024-06-21 LAB — POC HEMOGLOBIN A1C: 7.5 % (ref 4.2–6.5)

## 2024-06-21 PROCEDURE — 99214 OFFICE O/P EST MOD 30 MIN: CPT | Performed by: NURSE PRACTITIONER

## 2024-06-21 PROCEDURE — 83036 HEMOGLOBIN GLYCOSYLATED A1C: CPT | Performed by: NURSE PRACTITIONER

## 2024-06-21 PROCEDURE — 3008F BODY MASS INDEX DOCD: CPT | Performed by: NURSE PRACTITIONER

## 2024-06-21 PROCEDURE — 3075F SYST BP GE 130 - 139MM HG: CPT | Performed by: NURSE PRACTITIONER

## 2024-06-21 PROCEDURE — 4010F ACE/ARB THERAPY RXD/TAKEN: CPT | Performed by: NURSE PRACTITIONER

## 2024-06-21 PROCEDURE — 3078F DIAST BP <80 MM HG: CPT | Performed by: NURSE PRACTITIONER

## 2024-06-21 RX ORDER — DULAGLUTIDE 1.5 MG/.5ML
1.5 INJECTION, SOLUTION SUBCUTANEOUS
Qty: 2 ML | Refills: 1 | Status: SHIPPED | OUTPATIENT
Start: 2024-06-23

## 2024-06-21 RX ORDER — EMPAGLIFLOZIN 25 MG/1
25 TABLET, FILM COATED ORAL DAILY
Qty: 30 TABLET | Refills: 11 | Status: SHIPPED | OUTPATIENT
Start: 2024-06-21

## 2024-06-21 ASSESSMENT — LIFESTYLE VARIABLES
HOW MANY STANDARD DRINKS CONTAINING ALCOHOL DO YOU HAVE ON A TYPICAL DAY: PATIENT DOES NOT DRINK
HOW OFTEN DO YOU HAVE A DRINK CONTAINING ALCOHOL: NEVER
SKIP TO QUESTIONS 9-10: 1
AUDIT-C TOTAL SCORE: 0
HOW OFTEN DO YOU HAVE SIX OR MORE DRINKS ON ONE OCCASION: NEVER

## 2024-06-21 ASSESSMENT — PATIENT HEALTH QUESTIONNAIRE - PHQ9
SUM OF ALL RESPONSES TO PHQ9 QUESTIONS 1 & 2: 0
1. LITTLE INTEREST OR PLEASURE IN DOING THINGS: NOT AT ALL
2. FEELING DOWN, DEPRESSED OR HOPELESS: NOT AT ALL

## 2024-06-21 ASSESSMENT — PAIN SCALES - GENERAL: PAINLEVEL: 0-NO PAIN

## 2024-06-21 ASSESSMENT — ENCOUNTER SYMPTOMS
LOSS OF SENSATION IN FEET: 0
DEPRESSION: 0
OCCASIONAL FEELINGS OF UNSTEADINESS: 0

## 2024-06-21 NOTE — PROGRESS NOTES
HPI   54 yo with Diabetes 2 (dx in his 30's), HTN, Dyslipidemia, CVD s/p cabg , gout, presents for followup. A1c 7.5% was 7.2%. Pt is testing sugars <1 times per day. Pt is having low sugars 0 times/week. Pt's typical blood sugars are running 130's on waking, infrequent tests in the evening . Pt is following a carb controlled diet and knows reasonable carb allowances. Pt is able to afford their medications. Pt is exercising.         Taking metformin 500mger (4), jardiance 25mg, trulicity 3 mg (ozempic was working but not covered in 2023)         Taking atorvastatin 40mg, zetia 10mg tolerating LDL 59         Taking lisinopril 20mg, imdur 30mg for htn/cvd.    Patient had ER visit and diagnosed with abdominal pain/constipation/idiopathic acute pancreatitis without infection or necrosis (normal lipase) followed up by GI (05/2024).       Current Outpatient Medications:     aspirin 81 mg EC tablet, Take 1 tablet (81 mg) by mouth once daily., Disp: , Rfl:     atorvastatin (Lipitor) 40 mg tablet, TAKE ONE TABLET BY MOUTH ONCE A DAY, Disp: 30 tablet, Rfl: 0    cholecalciferol (Vitamin D3) 50 mcg (2,000 unit) capsule, TAKE ONE CAPSULE BY MOUTH DAILY, Disp: 90 capsule, Rfl: 3    clopidogrel (Plavix) 75 mg tablet, TAKE ONE TABLET BY MOUTH DAILY, Disp: 90 tablet, Rfl: 3    dulaglutide (Trulicity) 3 mg/0.5 mL pen injector, USE AS DIRECTED. INJECT 3mg SUBCUTANEOUSLY WEEKLY, Disp: 2 mL, Rfl: 5    ezetimibe (Zetia) 10 mg tablet, , Disp: , Rfl:     isosorbide mononitrate ER (Imdur) 30 mg 24 hr tablet, TAKE ONE TABLET BY MOUTH DAILY AT BEDTIME, Disp: 90 tablet, Rfl: 3    lisinopril 20 mg tablet, TAKE ONE TABLET BY MOUTH EVERY DAY, Disp: 90 tablet, Rfl: 3    metFORMIN (Glucophage) 500 mg tablet, take 2 tablets by mouth (1000mg) by mouth 2 times a day with meals., Disp: 360 tablet, Rfl: 0    omeprazole (PriLOSEC) 20 mg DR capsule, Take by mouth., Disp: , Rfl:     dulaglutide (Trulicity) 1.5 mg/0.5 mL pen injector injection, Inject 1.5  "mg under the skin 1 (one) time per week., Disp: 2 mL, Rfl: 1    Jardiance 25 mg, Take 1 tablet (25 mg) by mouth once daily., Disp: 30 tablet, Rfl: 11      Allergies as of 06/21/2024    (No Known Allergies)         Review of Systems   Cardiology: Lightheadedness-denies.  Chest pain-denies.  Leg edema-denies.  Palpitations-denies.  Respiratory: Cough-denies. Shortness of breath-denies.  Wheezing-denies.  Gastroenterology: Constipation-denies.  Diarrhea-denies.  Heartburn-denies.  Endocrinology: Cold intolerance-denies.  Heat intolerance-denies.  Sweats-denies.  Neurology: Headache-denies.  Tremor-denies.  Neuropathy in extremities-denies.  Psychology: Low energy-denies.  Irritability-denies.  Sleep disturbances-denies.      /78   Pulse 86   Ht 1.702 m (5' 7\")   Wt 96.2 kg (212 lb)   BMI 33.20 kg/m²       Labs:  Lab Results   Component Value Date    WBC 12.3 (H) 05/30/2024    NRBC 0.0 05/30/2024    RBC 5.36 05/30/2024    HGB 15.9 05/30/2024    HCT 46.0 05/30/2024     05/30/2024     Lab Results   Component Value Date    CALCIUM 9.2 05/30/2024    AST 14 05/30/2024    ALKPHOS 75 05/30/2024    BILITOT 1.1 05/30/2024    PROT 7.3 05/30/2024    ALBUMIN 4.2 05/30/2024    GLOB 3.1 06/12/2023    AGR 1.3 (L) 06/12/2023     05/30/2024    K 4.0 05/30/2024     05/30/2024    CO2 25 05/30/2024    ANIONGAP 14 05/30/2024    BUN 13 05/30/2024    CREATININE 1.06 05/30/2024    UREACREAUR 12.5 06/12/2023    GLUCOSE 126 (H) 05/30/2024    ALT 28 05/30/2024    EGFR 84 05/30/2024     Lab Results   Component Value Date    CHOL 122 (L) 12/29/2023    TRIG 94 12/29/2023    HDL 39.0 (L) 12/29/2023    LDLCALC 64 (L) 12/29/2023     Lab Results   Component Value Date    MICROALBCREA  12/29/2023      Comment:      One or more analytes used in this calculation is outside of the analytical measurement range. Calculation cannot be performed.     Lab Results   Component Value Date    TSH 2.97 12/29/2023     Lab Results "   Component Value Date    [id number] 783 12/29/2023     Lab Results   Component Value Date    HGBA1C 7.5 (A) 06/21/2024         Physical Exam   General Appearance: pleasant, cooperative, no acute distress  HEENT: no chemosis, no proptosis, no lid lag, no lid retraction  Neck: no lymphadenopathy, no thyromegaly, no dominant thyroid nodules  Heart: no murmurs, regular rate and rhythm, S1 and S2  Lungs: no wheezes, no rhonci, no rales  Extremities: no lower extremity swelling      Assessment/Plan   1. Type 2 diabetes mellitus with diabetic polyneuropathy, without long-term current use of insulin (Multi)  -seen by GI, remains feeling well  -can resume GLP1 lower dose x 1 month if okay return to 3 mg dose  -discussed possible/reportable side effects with GLP1 if symptoms return would stop GLP1 and not resume  -reviewed target BS    - POCT glycosylated hemoglobin (Hb A1C) manually resulted  - Jardiance 25 mg; Take 1 tablet (25 mg) by mouth once daily.  Dispense: 30 tablet; Refill: 11  - dulaglutide (Trulicity) 1.5 mg/0.5 mL pen injector injection; Inject 1.5 mg under the skin 1 (one) time per week.  Dispense: 2 mL; Refill: 1    2. Dyslipidemia  -statin + zetia  -LDL target < 70    3. Essential hypertension, benign  -stable     Follow Up: CNP 4 months    -labs/tests/notes reviewed  -reviewed and counseled patient on medication monitoring and side effects

## 2024-06-24 VITALS
HEIGHT: 67 IN | HEART RATE: 86 BPM | DIASTOLIC BLOOD PRESSURE: 78 MMHG | BODY MASS INDEX: 33.27 KG/M2 | WEIGHT: 212 LBS | SYSTOLIC BLOOD PRESSURE: 136 MMHG

## 2024-06-28 DIAGNOSIS — E11.42 TYPE 2 DIABETES MELLITUS WITH DIABETIC POLYNEUROPATHY, WITHOUT LONG-TERM CURRENT USE OF INSULIN (MULTI): ICD-10-CM

## 2024-07-01 RX ORDER — ATORVASTATIN CALCIUM 40 MG/1
40 TABLET, FILM COATED ORAL DAILY
Qty: 90 TABLET | Refills: 3 | Status: SHIPPED | OUTPATIENT
Start: 2024-07-01

## 2024-09-26 ENCOUNTER — TELEPHONE (OUTPATIENT)
Dept: ENDOCRINOLOGY | Facility: CLINIC | Age: 54
End: 2024-09-26
Payer: COMMERCIAL

## 2024-09-26 NOTE — TELEPHONE ENCOUNTER
Arnaldo Balderrama   1970   [id number]   [phone]       Called and spoke to patient in regards to canceling 10/21/24 appt with CNP and rescheduled with MD due to provider is leaving the practice.

## 2024-09-30 NOTE — PROGRESS NOTES
HPI   55 yo with Diabetes 2 (dx in his 30's), HTN, Dyslipidemia, CVD s/p cabg , gout, presents for followup. A1c 7.5% last visit, today 8.2%.     Pt is testing sugars <1 times per day. Pt is having low sugars 0 times/week. Pt's typical blood sugars are running 120-150's on waking, infrequent tests in the evening . Pt is following a carb controlled diet and knows reasonable carb allowances. Pt is able to afford their medications. Pt is active at work.           Taking metformin 500mger (4), jardiance 25mg, trulicity 3 mg   -(ozempic was working but not covered in 2023)  -no tzd/fluid retention concerns with cardiac history per previous notes           Taking atorvastatin 40mg, zetia 10mg and tolerating.           Taking lisinopril 20mg, imdur 30mg for htn/cvd.     Patient had ER visit and diagnosed with abdominal pain/constipation/idiopathic acute pancreatitis without infection or necrosis (normal lipase) followed up by GI (05/2024).           Current Outpatient Medications:     aspirin 81 mg EC tablet, Take 1 tablet (81 mg) by mouth once daily., Disp: , Rfl:     atorvastatin (Lipitor) 40 mg tablet, TAKE ONE TABLET BY MOUTH once a day, Disp: 90 tablet, Rfl: 3    cholecalciferol (Vitamin D3) 50 mcg (2,000 unit) capsule, TAKE ONE CAPSULE BY MOUTH DAILY, Disp: 90 capsule, Rfl: 3    clopidogrel (Plavix) 75 mg tablet, TAKE ONE TABLET BY MOUTH DAILY, Disp: 90 tablet, Rfl: 3    dulaglutide (Trulicity) 1.5 mg/0.5 mL pen injector injection, Inject 1.5 mg under the skin 1 (one) time per week., Disp: 2 mL, Rfl: 1    dulaglutide (Trulicity) 3 mg/0.5 mL pen injector, USE AS DIRECTED. INJECT 3mg SUBCUTANEOUSLY WEEKLY, Disp: 2 mL, Rfl: 5    ezetimibe (Zetia) 10 mg tablet, , Disp: , Rfl:     isosorbide mononitrate ER (Imdur) 30 mg 24 hr tablet, TAKE ONE TABLET BY MOUTH DAILY AT BEDTIME, Disp: 90 tablet, Rfl: 3    Jardiance 25 mg, Take 1 tablet (25 mg) by mouth once daily., Disp: 30 tablet, Rfl: 11    lisinopril 20 mg tablet, TAKE  "ONE TABLET BY MOUTH EVERY DAY, Disp: 90 tablet, Rfl: 3    metFORMIN (Glucophage) 500 mg tablet, take 2 tablets by mouth (1000mg) by mouth 2 times a day with meals., Disp: 360 tablet, Rfl: 0    omeprazole (PriLOSEC) 20 mg DR capsule, Take by mouth., Disp: , Rfl:       Allergies as of 10/01/2024    (No Known Allergies)         Review of Systems   Cardiology: Lightheadedness-denies.  Chest pain-denies.  Leg edema-denies.  Palpitations-denies.  Respiratory: Cough-denies. Shortness of breath-at times.  Wheezing-denies.  Gastroenterology: Constipation-denies.  Diarrhea-denies.  Heartburn-denies.  Endocrinology: Cold intolerance-denies.  Heat intolerance-denies.  Sweats-denies.  Neurology: Headache-denies.  Tremor-denies.  Neuropathy in extremities-denies.  Psychology: Low energy-denies.  Irritability-denies.  Sleep disturbances-denies.      /81   Pulse 81   Ht 1.702 m (5' 7\")   Wt 103 kg (226 lb 6.4 oz)   BMI 35.46 kg/m²       Labs:  Lab Results   Component Value Date    WBC 12.3 (H) 05/30/2024    NRBC 0.0 05/30/2024    RBC 5.36 05/30/2024    HGB 15.9 05/30/2024    HCT 46.0 05/30/2024     05/30/2024     Lab Results   Component Value Date    CALCIUM 9.2 05/30/2024    AST 14 05/30/2024    ALKPHOS 75 05/30/2024    BILITOT 1.1 05/30/2024    PROT 7.3 05/30/2024    ALBUMIN 4.2 05/30/2024    GLOB 3.1 06/12/2023    AGR 1.3 (L) 06/12/2023     05/30/2024    K 4.0 05/30/2024     05/30/2024    CO2 25 05/30/2024    ANIONGAP 14 05/30/2024    BUN 13 05/30/2024    CREATININE 1.06 05/30/2024    UREACREAUR 12.5 06/12/2023    GLUCOSE 126 (H) 05/30/2024    ALT 28 05/30/2024    EGFR 84 05/30/2024     Lab Results   Component Value Date    CHOL 122 (L) 12/29/2023    TRIG 94 12/29/2023    HDL 39.0 (L) 12/29/2023    LDLCALC 64 (L) 12/29/2023     Lab Results   Component Value Date    MICROALBCREA  12/29/2023      Comment:      One or more analytes used in this calculation is outside of the analytical measurement range. " Calculation cannot be performed.     Lab Results   Component Value Date    TSH 2.97 12/29/2023     Lab Results   Component Value Date    [id number] 783 12/29/2023     Lab Results   Component Value Date    HGBA1C 8.3 (A) 10/01/2024         Physical Exam   General Appearance: pleasant, cooperative, no acute distress  HEENT: no chemosis, no proptosis, no lid lag, no lid retraction  Neck: no lymphadenopathy, no thyromegaly, no dominant thyroid nodules  Heart: no murmurs, regular rate and rhythm, S1 and S2  Lungs: no wheezes, no rhonci, no rales  Extremities: no lower extremity swelling      Assessment/Plan   1. Type 2 diabetes mellitus with diabetic polyneuropathy, without long-term current use of insulin  -A1c ordered and reviewed  -glycemic values reviewed  -labs reviewed    -as pt has no gallbladder, doesn't drink alcohol with a mild case of pancreatitis would simply hold trulicity/glp-1's as we have no other good cause    -add glipizide 5mg bid, watch for low blood sugars    -test daily at different times of day    -if ok with cardiology, could consider low dose tzd cautiously in the future    2. Dyslipidemia  -on statin and tolerating    3. Essential hypertension, benign  -at target on therapy    4. Vitamin B12 deficiency  -on therapy         Follow Up:  Amanda 3-4 months    -labs/tests/notes reviewed  -reviewed and counseled patient on medication monitoring and side effects

## 2024-10-01 ENCOUNTER — APPOINTMENT (OUTPATIENT)
Dept: ENDOCRINOLOGY | Facility: CLINIC | Age: 54
End: 2024-10-01
Payer: COMMERCIAL

## 2024-10-01 VITALS
SYSTOLIC BLOOD PRESSURE: 120 MMHG | DIASTOLIC BLOOD PRESSURE: 81 MMHG | WEIGHT: 226.4 LBS | BODY MASS INDEX: 35.53 KG/M2 | HEART RATE: 81 BPM | HEIGHT: 67 IN

## 2024-10-01 DIAGNOSIS — I10 ESSENTIAL HYPERTENSION, BENIGN: ICD-10-CM

## 2024-10-01 DIAGNOSIS — E11.42 TYPE 2 DIABETES MELLITUS WITH DIABETIC POLYNEUROPATHY, WITHOUT LONG-TERM CURRENT USE OF INSULIN: Primary | ICD-10-CM

## 2024-10-01 DIAGNOSIS — E53.8 VITAMIN B12 DEFICIENCY: ICD-10-CM

## 2024-10-01 DIAGNOSIS — E78.5 DYSLIPIDEMIA: ICD-10-CM

## 2024-10-01 LAB — POC HEMOGLOBIN A1C: 8.3 % (ref 4.2–6.5)

## 2024-10-01 PROCEDURE — 3074F SYST BP LT 130 MM HG: CPT | Performed by: INTERNAL MEDICINE

## 2024-10-01 PROCEDURE — 3008F BODY MASS INDEX DOCD: CPT | Performed by: INTERNAL MEDICINE

## 2024-10-01 PROCEDURE — 1036F TOBACCO NON-USER: CPT | Performed by: INTERNAL MEDICINE

## 2024-10-01 PROCEDURE — 4010F ACE/ARB THERAPY RXD/TAKEN: CPT | Performed by: INTERNAL MEDICINE

## 2024-10-01 PROCEDURE — 99214 OFFICE O/P EST MOD 30 MIN: CPT | Performed by: INTERNAL MEDICINE

## 2024-10-01 PROCEDURE — 83036 HEMOGLOBIN GLYCOSYLATED A1C: CPT | Performed by: INTERNAL MEDICINE

## 2024-10-01 PROCEDURE — 3079F DIAST BP 80-89 MM HG: CPT | Performed by: INTERNAL MEDICINE

## 2024-10-01 RX ORDER — GLIPIZIDE 5 MG/1
5 TABLET ORAL
Qty: 60 TABLET | Refills: 6 | Status: SHIPPED | OUTPATIENT
Start: 2024-10-01 | End: 2025-10-01

## 2024-10-01 ASSESSMENT — PAIN SCALES - GENERAL: PAINLEVEL: 0-NO PAIN

## 2024-10-01 ASSESSMENT — ENCOUNTER SYMPTOMS
OCCASIONAL FEELINGS OF UNSTEADINESS: 0
LOSS OF SENSATION IN FEET: 0
DEPRESSION: 0

## 2024-10-01 ASSESSMENT — LIFESTYLE VARIABLES
HOW MANY STANDARD DRINKS CONTAINING ALCOHOL DO YOU HAVE ON A TYPICAL DAY: 1 OR 2
HOW OFTEN DO YOU HAVE A DRINK CONTAINING ALCOHOL: 2-4 TIMES A MONTH
HOW OFTEN DO YOU HAVE SIX OR MORE DRINKS ON ONE OCCASION: NEVER
SKIP TO QUESTIONS 9-10: 1
AUDIT-C TOTAL SCORE: 2

## 2024-10-01 ASSESSMENT — PATIENT HEALTH QUESTIONNAIRE - PHQ9
1. LITTLE INTEREST OR PLEASURE IN DOING THINGS: NOT AT ALL
SUM OF ALL RESPONSES TO PHQ9 QUESTIONS 1 & 2: 0
2. FEELING DOWN, DEPRESSED OR HOPELESS: NOT AT ALL

## 2024-10-20 ENCOUNTER — APPOINTMENT (OUTPATIENT)
Dept: CARDIOLOGY | Facility: HOSPITAL | Age: 54
End: 2024-10-20
Payer: COMMERCIAL

## 2024-10-20 ENCOUNTER — HOSPITAL ENCOUNTER (INPATIENT)
Facility: HOSPITAL | Age: 54
End: 2024-10-20
Attending: EMERGENCY MEDICINE | Admitting: INTERNAL MEDICINE
Payer: COMMERCIAL

## 2024-10-20 ENCOUNTER — APPOINTMENT (OUTPATIENT)
Dept: RADIOLOGY | Facility: HOSPITAL | Age: 54
End: 2024-10-20
Payer: COMMERCIAL

## 2024-10-20 VITALS
RESPIRATION RATE: 20 BRPM | HEIGHT: 67 IN | BODY MASS INDEX: 35.79 KG/M2 | SYSTOLIC BLOOD PRESSURE: 134 MMHG | OXYGEN SATURATION: 97 % | WEIGHT: 228 LBS | DIASTOLIC BLOOD PRESSURE: 83 MMHG | HEART RATE: 85 BPM | TEMPERATURE: 97.7 F

## 2024-10-20 DIAGNOSIS — I21.4 NSTEMI (NON-ST ELEVATED MYOCARDIAL INFARCTION) (MULTI): Primary | ICD-10-CM

## 2024-10-20 DIAGNOSIS — E83.42 HYPOMAGNESEMIA: ICD-10-CM

## 2024-10-20 LAB
ALBUMIN SERPL BCP-MCNC: 3.8 G/DL (ref 3.4–5)
ALP SERPL-CCNC: 60 U/L (ref 33–120)
ALT SERPL W P-5'-P-CCNC: 30 U/L (ref 10–52)
ANION GAP SERPL CALC-SCNC: 13 MMOL/L (ref 10–20)
APTT PPP: 35 SECONDS (ref 27–38)
AST SERPL W P-5'-P-CCNC: 22 U/L (ref 9–39)
BASOPHILS # BLD AUTO: 0.05 X10*3/UL (ref 0–0.1)
BASOPHILS NFR BLD AUTO: 0.5 %
BILIRUB SERPL-MCNC: 0.7 MG/DL (ref 0–1.2)
BNP SERPL-MCNC: 521 PG/ML (ref 0–99)
BUN SERPL-MCNC: 9 MG/DL (ref 6–23)
CALCIUM SERPL-MCNC: 8.3 MG/DL (ref 8.6–10.3)
CARDIAC TROPONIN I PNL SERPL HS: 349 NG/L (ref 0–20)
CARDIAC TROPONIN I PNL SERPL HS: 427 NG/L (ref 0–20)
CARDIAC TROPONIN I PNL SERPL HS: 514 NG/L (ref 0–20)
CHLORIDE SERPL-SCNC: 102 MMOL/L (ref 98–107)
CHOLEST SERPL-MCNC: 117 MG/DL (ref 0–199)
CHOLESTEROL/HDL RATIO: 3.7
CO2 SERPL-SCNC: 26 MMOL/L (ref 21–32)
CREAT SERPL-MCNC: 0.86 MG/DL (ref 0.5–1.3)
EGFRCR SERPLBLD CKD-EPI 2021: >90 ML/MIN/1.73M*2
EOSINOPHIL # BLD AUTO: 0.11 X10*3/UL (ref 0–0.7)
EOSINOPHIL NFR BLD AUTO: 1.1 %
ERYTHROCYTE [DISTWIDTH] IN BLOOD BY AUTOMATED COUNT: 13.2 % (ref 11.5–14.5)
GLUCOSE BLD MANUAL STRIP-MCNC: 180 MG/DL (ref 74–99)
GLUCOSE BLD MANUAL STRIP-MCNC: 240 MG/DL (ref 74–99)
GLUCOSE BLD MANUAL STRIP-MCNC: 267 MG/DL (ref 74–99)
GLUCOSE SERPL-MCNC: 176 MG/DL (ref 74–99)
HCT VFR BLD AUTO: 44.7 % (ref 41–52)
HDLC SERPL-MCNC: 31.3 MG/DL
HGB BLD-MCNC: 15 G/DL (ref 13.5–17.5)
IMM GRANULOCYTES # BLD AUTO: 0.06 X10*3/UL (ref 0–0.7)
IMM GRANULOCYTES NFR BLD AUTO: 0.6 % (ref 0–0.9)
INR PPP: 1.1 (ref 0.9–1.1)
LDLC SERPL CALC-MCNC: 68 MG/DL
LYMPHOCYTES # BLD AUTO: 2.6 X10*3/UL (ref 1.2–4.8)
LYMPHOCYTES NFR BLD AUTO: 25.9 %
MAGNESIUM SERPL-MCNC: 0.96 MG/DL (ref 1.6–2.4)
MCH RBC QN AUTO: 28.7 PG (ref 26–34)
MCHC RBC AUTO-ENTMCNC: 33.6 G/DL (ref 32–36)
MCV RBC AUTO: 86 FL (ref 80–100)
MONOCYTES # BLD AUTO: 0.75 X10*3/UL (ref 0.1–1)
MONOCYTES NFR BLD AUTO: 7.5 %
NEUTROPHILS # BLD AUTO: 6.47 X10*3/UL (ref 1.2–7.7)
NEUTROPHILS NFR BLD AUTO: 64.4 %
NON HDL CHOLESTEROL: 86 MG/DL (ref 0–149)
NRBC BLD-RTO: 0 /100 WBCS (ref 0–0)
PLATELET # BLD AUTO: 274 X10*3/UL (ref 150–450)
POTASSIUM SERPL-SCNC: 4.1 MMOL/L (ref 3.5–5.3)
PROT SERPL-MCNC: 6.9 G/DL (ref 6.4–8.2)
PROTHROMBIN TIME: 12 SECONDS (ref 9.8–12.8)
RBC # BLD AUTO: 5.23 X10*6/UL (ref 4.5–5.9)
SODIUM SERPL-SCNC: 137 MMOL/L (ref 136–145)
TRIGL SERPL-MCNC: 88 MG/DL (ref 0–149)
UFH PPP CHRO-ACNC: 0.1 IU/ML
UFH PPP CHRO-ACNC: 0.2 IU/ML
VLDL: 18 MG/DL (ref 0–40)
WBC # BLD AUTO: 10 X10*3/UL (ref 4.4–11.3)

## 2024-10-20 PROCEDURE — 83880 ASSAY OF NATRIURETIC PEPTIDE: CPT | Performed by: PHYSICIAN ASSISTANT

## 2024-10-20 PROCEDURE — 82947 ASSAY GLUCOSE BLOOD QUANT: CPT

## 2024-10-20 PROCEDURE — 96375 TX/PRO/DX INJ NEW DRUG ADDON: CPT

## 2024-10-20 PROCEDURE — B2131ZZ FLUOROSCOPY OF MULTIPLE CORONARY ARTERY BYPASS GRAFTS USING LOW OSMOLAR CONTRAST: ICD-10-PCS | Performed by: INTERNAL MEDICINE

## 2024-10-20 PROCEDURE — 2500000001 HC RX 250 WO HCPCS SELF ADMINISTERED DRUGS (ALT 637 FOR MEDICARE OP)

## 2024-10-20 PROCEDURE — 2500000004 HC RX 250 GENERAL PHARMACY W/ HCPCS (ALT 636 FOR OP/ED): Performed by: NURSE PRACTITIONER

## 2024-10-20 PROCEDURE — 80061 LIPID PANEL: CPT

## 2024-10-20 PROCEDURE — 96374 THER/PROPH/DIAG INJ IV PUSH: CPT

## 2024-10-20 PROCEDURE — 71275 CT ANGIOGRAPHY CHEST: CPT | Performed by: RADIOLOGY

## 2024-10-20 PROCEDURE — 85520 HEPARIN ASSAY: CPT

## 2024-10-20 PROCEDURE — 80053 COMPREHEN METABOLIC PANEL: CPT | Performed by: PHYSICIAN ASSISTANT

## 2024-10-20 PROCEDURE — 94760 N-INVAS EAR/PLS OXIMETRY 1: CPT

## 2024-10-20 PROCEDURE — 2500000004 HC RX 250 GENERAL PHARMACY W/ HCPCS (ALT 636 FOR OP/ED): Performed by: PHYSICIAN ASSISTANT

## 2024-10-20 PROCEDURE — 71275 CT ANGIOGRAPHY CHEST: CPT

## 2024-10-20 PROCEDURE — 84484 ASSAY OF TROPONIN QUANT: CPT | Performed by: PHYSICIAN ASSISTANT

## 2024-10-20 PROCEDURE — 36415 COLL VENOUS BLD VENIPUNCTURE: CPT | Performed by: PHYSICIAN ASSISTANT

## 2024-10-20 PROCEDURE — 84484 ASSAY OF TROPONIN QUANT: CPT

## 2024-10-20 PROCEDURE — 83735 ASSAY OF MAGNESIUM: CPT | Performed by: PHYSICIAN ASSISTANT

## 2024-10-20 PROCEDURE — 4A023N7 MEASUREMENT OF CARDIAC SAMPLING AND PRESSURE, LEFT HEART, PERCUTANEOUS APPROACH: ICD-10-PCS | Performed by: INTERNAL MEDICINE

## 2024-10-20 PROCEDURE — 93005 ELECTROCARDIOGRAM TRACING: CPT

## 2024-10-20 PROCEDURE — 85025 COMPLETE CBC W/AUTO DIFF WBC: CPT | Performed by: PHYSICIAN ASSISTANT

## 2024-10-20 PROCEDURE — 2500000001 HC RX 250 WO HCPCS SELF ADMINISTERED DRUGS (ALT 637 FOR MEDICARE OP): Performed by: PHYSICIAN ASSISTANT

## 2024-10-20 PROCEDURE — 2500000001 HC RX 250 WO HCPCS SELF ADMINISTERED DRUGS (ALT 637 FOR MEDICARE OP): Performed by: NURSE PRACTITIONER

## 2024-10-20 PROCEDURE — 99291 CRITICAL CARE FIRST HOUR: CPT | Performed by: EMERGENCY MEDICINE

## 2024-10-20 PROCEDURE — B2111ZZ FLUOROSCOPY OF MULTIPLE CORONARY ARTERIES USING LOW OSMOLAR CONTRAST: ICD-10-PCS | Performed by: INTERNAL MEDICINE

## 2024-10-20 PROCEDURE — 2500000002 HC RX 250 W HCPCS SELF ADMINISTERED DRUGS (ALT 637 FOR MEDICARE OP, ALT 636 FOR OP/ED): Performed by: NURSE PRACTITIONER

## 2024-10-20 PROCEDURE — 85610 PROTHROMBIN TIME: CPT | Performed by: PHYSICIAN ASSISTANT

## 2024-10-20 PROCEDURE — 2500000005 HC RX 250 GENERAL PHARMACY W/O HCPCS: Performed by: INTERNAL MEDICINE

## 2024-10-20 PROCEDURE — B2181ZZ FLUOROSCOPY OF LEFT INTERNAL MAMMARY BYPASS GRAFT USING LOW OSMOLAR CONTRAST: ICD-10-PCS | Performed by: INTERNAL MEDICINE

## 2024-10-20 PROCEDURE — 2500000002 HC RX 250 W HCPCS SELF ADMINISTERED DRUGS (ALT 637 FOR MEDICARE OP, ALT 636 FOR OP/ED)

## 2024-10-20 PROCEDURE — 1200000002 HC GENERAL ROOM WITH TELEMETRY DAILY

## 2024-10-20 PROCEDURE — 2550000001 HC RX 255 CONTRASTS: Performed by: EMERGENCY MEDICINE

## 2024-10-20 PROCEDURE — 99223 1ST HOSP IP/OBS HIGH 75: CPT

## 2024-10-20 PROCEDURE — 99223 1ST HOSP IP/OBS HIGH 75: CPT | Performed by: NURSE PRACTITIONER

## 2024-10-20 PROCEDURE — 85730 THROMBOPLASTIN TIME PARTIAL: CPT | Performed by: PHYSICIAN ASSISTANT

## 2024-10-20 RX ORDER — PANTOPRAZOLE SODIUM 20 MG/1
20 TABLET, DELAYED RELEASE ORAL
Status: DISCONTINUED | OUTPATIENT
Start: 2024-10-21 | End: 2024-10-21 | Stop reason: HOSPADM

## 2024-10-20 RX ORDER — NAPROXEN SODIUM 220 MG/1
324 TABLET, FILM COATED ORAL ONCE
Status: COMPLETED | OUTPATIENT
Start: 2024-10-20 | End: 2024-10-20

## 2024-10-20 RX ORDER — INSULIN LISPRO 100 [IU]/ML
0-10 INJECTION, SOLUTION INTRAVENOUS; SUBCUTANEOUS
Status: DISCONTINUED | OUTPATIENT
Start: 2024-10-20 | End: 2024-10-20

## 2024-10-20 RX ORDER — METOPROLOL TARTRATE 25 MG/1
25 TABLET, FILM COATED ORAL 2 TIMES DAILY
Status: DISCONTINUED | OUTPATIENT
Start: 2024-10-20 | End: 2024-10-21

## 2024-10-20 RX ORDER — FUROSEMIDE 10 MG/ML
20 INJECTION INTRAMUSCULAR; INTRAVENOUS ONCE
Status: COMPLETED | OUTPATIENT
Start: 2024-10-20 | End: 2024-10-20

## 2024-10-20 RX ORDER — FUROSEMIDE 10 MG/ML
40 INJECTION INTRAMUSCULAR; INTRAVENOUS EVERY 12 HOURS
Status: DISCONTINUED | OUTPATIENT
Start: 2024-10-20 | End: 2024-10-21

## 2024-10-20 RX ORDER — ASPIRIN 81 MG/1
81 TABLET ORAL DAILY
Status: DISCONTINUED | OUTPATIENT
Start: 2024-10-21 | End: 2024-10-21 | Stop reason: HOSPADM

## 2024-10-20 RX ORDER — CLOPIDOGREL BISULFATE 75 MG/1
75 TABLET ORAL DAILY
Status: DISCONTINUED | OUTPATIENT
Start: 2024-10-20 | End: 2024-10-20

## 2024-10-20 RX ORDER — LISINOPRIL 20 MG/1
20 TABLET ORAL DAILY
Status: DISCONTINUED | OUTPATIENT
Start: 2024-10-20 | End: 2024-10-21 | Stop reason: HOSPADM

## 2024-10-20 RX ORDER — ATORVASTATIN CALCIUM 40 MG/1
40 TABLET, FILM COATED ORAL NIGHTLY
Status: DISCONTINUED | OUTPATIENT
Start: 2024-10-20 | End: 2024-10-21

## 2024-10-20 RX ORDER — ATORVASTATIN CALCIUM 80 MG/1
80 TABLET, FILM COATED ORAL DAILY
Status: DISCONTINUED | OUTPATIENT
Start: 2024-10-20 | End: 2024-10-20

## 2024-10-20 RX ORDER — INSULIN LISPRO 100 [IU]/ML
0-10 INJECTION, SOLUTION INTRAVENOUS; SUBCUTANEOUS
Status: DISCONTINUED | OUTPATIENT
Start: 2024-10-20 | End: 2024-10-21 | Stop reason: HOSPADM

## 2024-10-20 RX ORDER — HEPARIN SODIUM 10000 [USP'U]/100ML
0-4000 INJECTION, SOLUTION INTRAVENOUS CONTINUOUS
Status: DISCONTINUED | OUTPATIENT
Start: 2024-10-20 | End: 2024-10-21 | Stop reason: HOSPADM

## 2024-10-20 RX ORDER — ISOSORBIDE MONONITRATE 30 MG/1
30 TABLET, EXTENDED RELEASE ORAL NIGHTLY
Status: DISCONTINUED | OUTPATIENT
Start: 2024-10-20 | End: 2024-10-21

## 2024-10-20 RX ORDER — MAGNESIUM SULFATE HEPTAHYDRATE 40 MG/ML
2 INJECTION, SOLUTION INTRAVENOUS ONCE
Status: COMPLETED | OUTPATIENT
Start: 2024-10-20 | End: 2024-10-20

## 2024-10-20 RX ORDER — EZETIMIBE 10 MG/1
10 TABLET ORAL NIGHTLY
Status: DISCONTINUED | OUTPATIENT
Start: 2024-10-20 | End: 2024-10-21 | Stop reason: HOSPADM

## 2024-10-20 RX ORDER — ATORVASTATIN CALCIUM 40 MG/1
40 TABLET, FILM COATED ORAL DAILY
Status: DISCONTINUED | OUTPATIENT
Start: 2024-10-20 | End: 2024-10-20

## 2024-10-20 RX ORDER — CHOLECALCIFEROL (VITAMIN D3) 25 MCG
2000 TABLET ORAL DAILY
Status: DISCONTINUED | OUTPATIENT
Start: 2024-10-20 | End: 2024-10-21 | Stop reason: HOSPADM

## 2024-10-20 RX ADMIN — Medication 2 L/MIN: at 19:45

## 2024-10-20 RX ADMIN — TICAGRELOR 180 MG: 90 TABLET ORAL at 14:30

## 2024-10-20 RX ADMIN — METOPROLOL TARTRATE 25 MG: 25 TABLET, FILM COATED ORAL at 13:51

## 2024-10-20 RX ADMIN — METOPROLOL TARTRATE 25 MG: 25 TABLET, FILM COATED ORAL at 20:39

## 2024-10-20 RX ADMIN — ASPIRIN 81 MG 324 MG: 81 TABLET ORAL at 10:10

## 2024-10-20 RX ADMIN — HEPARIN SODIUM 1000 UNITS/HR: 10000 INJECTION, SOLUTION INTRAVENOUS at 11:01

## 2024-10-20 RX ADMIN — INSULIN LISPRO 6 UNITS: 100 INJECTION, SOLUTION INTRAVENOUS; SUBCUTANEOUS at 16:56

## 2024-10-20 RX ADMIN — MAGNESIUM SULFATE HEPTAHYDRATE 2 G: 2 INJECTION, SOLUTION INTRAVENOUS at 11:00

## 2024-10-20 RX ADMIN — EZETIMIBE 10 MG: 10 TABLET ORAL at 20:39

## 2024-10-20 RX ADMIN — FUROSEMIDE 20 MG: 10 INJECTION, SOLUTION INTRAMUSCULAR; INTRAVENOUS at 11:42

## 2024-10-20 RX ADMIN — TICAGRELOR 90 MG: 90 TABLET ORAL at 20:38

## 2024-10-20 RX ADMIN — ATORVASTATIN CALCIUM 40 MG: 40 TABLET, FILM COATED ORAL at 20:39

## 2024-10-20 RX ADMIN — IOHEXOL 75 ML: 350 INJECTION, SOLUTION INTRAVENOUS at 10:45

## 2024-10-20 RX ADMIN — FUROSEMIDE 40 MG: 10 INJECTION, SOLUTION INTRAMUSCULAR; INTRAVENOUS at 16:56

## 2024-10-20 RX ADMIN — ISOSORBIDE MONONITRATE 30 MG: 30 TABLET, EXTENDED RELEASE ORAL at 20:38

## 2024-10-20 RX ADMIN — LISINOPRIL 20 MG: 20 TABLET ORAL at 13:51

## 2024-10-20 RX ADMIN — Medication 2000 UNITS: at 13:51

## 2024-10-20 RX ADMIN — NITROGLYCERIN 1 INCH: 20 OINTMENT TOPICAL at 10:09

## 2024-10-20 RX ADMIN — INSULIN LISPRO 4 UNITS: 100 INJECTION, SOLUTION INTRAVENOUS; SUBCUTANEOUS at 20:40

## 2024-10-20 SDOH — SOCIAL STABILITY: SOCIAL INSECURITY: WITHIN THE LAST YEAR, HAVE YOU BEEN AFRAID OF YOUR PARTNER OR EX-PARTNER?: NO

## 2024-10-20 SDOH — SOCIAL STABILITY: SOCIAL INSECURITY: WERE YOU ABLE TO COMPLETE ALL THE BEHAVIORAL HEALTH SCREENINGS?: YES

## 2024-10-20 SDOH — ECONOMIC STABILITY: HOUSING INSECURITY: IN THE LAST 12 MONTHS, WAS THERE A TIME WHEN YOU WERE NOT ABLE TO PAY THE MORTGAGE OR RENT ON TIME?: NO

## 2024-10-20 SDOH — SOCIAL STABILITY: SOCIAL INSECURITY
WITHIN THE LAST YEAR, HAVE YOU BEEN KICKED, HIT, SLAPPED, OR OTHERWISE PHYSICALLY HURT BY YOUR PARTNER OR EX-PARTNER?: NO

## 2024-10-20 SDOH — SOCIAL STABILITY: SOCIAL INSECURITY: WITHIN THE LAST YEAR, HAVE YOU BEEN HUMILIATED OR EMOTIONALLY ABUSED IN OTHER WAYS BY YOUR PARTNER OR EX-PARTNER?: NO

## 2024-10-20 SDOH — ECONOMIC STABILITY: HOUSING INSECURITY: IN THE PAST 12 MONTHS, HOW MANY TIMES HAVE YOU MOVED WHERE YOU WERE LIVING?: 0

## 2024-10-20 SDOH — ECONOMIC STABILITY: TRANSPORTATION INSECURITY: IN THE PAST 12 MONTHS, HAS LACK OF TRANSPORTATION KEPT YOU FROM MEDICAL APPOINTMENTS OR FROM GETTING MEDICATIONS?: NO

## 2024-10-20 SDOH — ECONOMIC STABILITY: INCOME INSECURITY: IN THE PAST 12 MONTHS HAS THE ELECTRIC, GAS, OIL, OR WATER COMPANY THREATENED TO SHUT OFF SERVICES IN YOUR HOME?: NO

## 2024-10-20 SDOH — ECONOMIC STABILITY: FOOD INSECURITY: WITHIN THE PAST 12 MONTHS, THE FOOD YOU BOUGHT JUST DIDN'T LAST AND YOU DIDN'T HAVE MONEY TO GET MORE.: NEVER TRUE

## 2024-10-20 SDOH — SOCIAL STABILITY: SOCIAL INSECURITY: ARE YOU OR HAVE YOU BEEN THREATENED OR ABUSED PHYSICALLY, EMOTIONALLY, OR SEXUALLY BY ANYONE?: NO

## 2024-10-20 SDOH — SOCIAL STABILITY: SOCIAL INSECURITY: HAVE YOU HAD ANY THOUGHTS OF HARMING ANYONE ELSE?: NO

## 2024-10-20 SDOH — ECONOMIC STABILITY: HOUSING INSECURITY: AT ANY TIME IN THE PAST 12 MONTHS, WERE YOU HOMELESS OR LIVING IN A SHELTER (INCLUDING NOW)?: NO

## 2024-10-20 SDOH — SOCIAL STABILITY: SOCIAL INSECURITY: DOES ANYONE TRY TO KEEP YOU FROM HAVING/CONTACTING OTHER FRIENDS OR DOING THINGS OUTSIDE YOUR HOME?: NO

## 2024-10-20 SDOH — SOCIAL STABILITY: SOCIAL INSECURITY: DO YOU FEEL UNSAFE GOING BACK TO THE PLACE WHERE YOU ARE LIVING?: NO

## 2024-10-20 SDOH — SOCIAL STABILITY: SOCIAL INSECURITY: HAS ANYONE EVER THREATENED TO HURT YOUR FAMILY OR YOUR PETS?: NO

## 2024-10-20 SDOH — SOCIAL STABILITY: SOCIAL INSECURITY
WITHIN THE LAST YEAR, HAVE YOU BEEN RAPED OR FORCED TO HAVE ANY KIND OF SEXUAL ACTIVITY BY YOUR PARTNER OR EX-PARTNER?: NO

## 2024-10-20 SDOH — ECONOMIC STABILITY: FOOD INSECURITY: WITHIN THE PAST 12 MONTHS, YOU WORRIED THAT YOUR FOOD WOULD RUN OUT BEFORE YOU GOT THE MONEY TO BUY MORE.: NEVER TRUE

## 2024-10-20 SDOH — SOCIAL STABILITY: SOCIAL INSECURITY: ARE THERE ANY APPARENT SIGNS OF INJURIES/BEHAVIORS THAT COULD BE RELATED TO ABUSE/NEGLECT?: NO

## 2024-10-20 SDOH — SOCIAL STABILITY: SOCIAL INSECURITY: ABUSE: ADULT

## 2024-10-20 SDOH — SOCIAL STABILITY: SOCIAL INSECURITY: HAVE YOU HAD THOUGHTS OF HARMING ANYONE ELSE?: NO

## 2024-10-20 SDOH — ECONOMIC STABILITY: FOOD INSECURITY: HOW HARD IS IT FOR YOU TO PAY FOR THE VERY BASICS LIKE FOOD, HOUSING, MEDICAL CARE, AND HEATING?: NOT HARD AT ALL

## 2024-10-20 ASSESSMENT — HEART SCORE
HISTORY: MODERATELY SUSPICIOUS
TROPONIN: GREATER THAN OR EQUAL TO 3 TIMES NORMAL LIMIT
RISK FACTORS: >2 RISK FACTORS OR HX OF ATHEROSCLEROTIC DISEASE
AGE: 45-64
HEART SCORE: 7
ECG: NON-SPECIFIC REPOLARIZATION DISTURBANCE

## 2024-10-20 ASSESSMENT — COGNITIVE AND FUNCTIONAL STATUS - GENERAL
HELP NEEDED FOR BATHING: A LITTLE
DAILY ACTIVITIY SCORE: 24
DRESSING REGULAR UPPER BODY CLOTHING: A LITTLE
TURNING FROM BACK TO SIDE WHILE IN FLAT BAD: A LITTLE
MOVING TO AND FROM BED TO CHAIR: A LITTLE
TURNING FROM BACK TO SIDE WHILE IN FLAT BAD: A LITTLE
PATIENT BASELINE BEDBOUND: NO
WALKING IN HOSPITAL ROOM: A LITTLE
TOILETING: A LITTLE
EATING MEALS: A LITTLE
DRESSING REGULAR LOWER BODY CLOTHING: A LITTLE
MOBILITY SCORE: 23
PERSONAL GROOMING: A LITTLE
MOBILITY SCORE: 21
DAILY ACTIVITIY SCORE: 18

## 2024-10-20 ASSESSMENT — LIFESTYLE VARIABLES
TOTAL SCORE: 0
EVER HAD A DRINK FIRST THING IN THE MORNING TO STEADY YOUR NERVES TO GET RID OF A HANGOVER: NO
SKIP TO QUESTIONS 9-10: 1
AUDIT-C TOTAL SCORE: 0
EVER FELT BAD OR GUILTY ABOUT YOUR DRINKING: NO
HOW OFTEN DO YOU HAVE A DRINK CONTAINING ALCOHOL: NEVER
HAVE PEOPLE ANNOYED YOU BY CRITICIZING YOUR DRINKING: NO
HAVE YOU EVER FELT YOU SHOULD CUT DOWN ON YOUR DRINKING: NO
HOW OFTEN DO YOU HAVE 6 OR MORE DRINKS ON ONE OCCASION: NEVER
HOW MANY STANDARD DRINKS CONTAINING ALCOHOL DO YOU HAVE ON A TYPICAL DAY: PATIENT DOES NOT DRINK
AUDIT-C TOTAL SCORE: 0

## 2024-10-20 ASSESSMENT — PAIN SCALES - GENERAL
PAINLEVEL_OUTOF10: 6
PAINLEVEL_OUTOF10: 0 - NO PAIN
PAINLEVEL_OUTOF10: 6
PAINLEVEL_OUTOF10: 0 - NO PAIN
PAINLEVEL_OUTOF10: 0 - NO PAIN

## 2024-10-20 ASSESSMENT — ACTIVITIES OF DAILY LIVING (ADL)
ADEQUATE_TO_COMPLETE_ADL: YES
GROOMING: INDEPENDENT
HEARING - RIGHT EAR: FUNCTIONAL
HEARING - LEFT EAR: FUNCTIONAL
TOILETING: INDEPENDENT
LACK_OF_TRANSPORTATION: NO
FEEDING YOURSELF: INDEPENDENT
WALKS IN HOME: INDEPENDENT
LACK_OF_TRANSPORTATION: NO
DRESSING YOURSELF: INDEPENDENT
JUDGMENT_ADEQUATE_SAFELY_COMPLETE_DAILY_ACTIVITIES: YES
BATHING: INDEPENDENT
PATIENT'S MEMORY ADEQUATE TO SAFELY COMPLETE DAILY ACTIVITIES?: YES

## 2024-10-20 ASSESSMENT — PAIN - FUNCTIONAL ASSESSMENT
PAIN_FUNCTIONAL_ASSESSMENT: 0-10

## 2024-10-20 ASSESSMENT — ENCOUNTER SYMPTOMS
SLEEP DISTURBANCE: 1
CHEST TIGHTNESS: 1
SHORTNESS OF BREATH: 1
DIARRHEA: 1

## 2024-10-20 ASSESSMENT — PATIENT HEALTH QUESTIONNAIRE - PHQ9
SUM OF ALL RESPONSES TO PHQ9 QUESTIONS 1 & 2: 0
2. FEELING DOWN, DEPRESSED OR HOPELESS: NOT AT ALL
1. LITTLE INTEREST OR PLEASURE IN DOING THINGS: NOT AT ALL

## 2024-10-20 ASSESSMENT — PAIN DESCRIPTION - DESCRIPTORS
DESCRIPTORS: ACHING
DESCRIPTORS: DISCOMFORT
DESCRIPTORS: ACHING

## 2024-10-20 ASSESSMENT — PAIN DESCRIPTION - LOCATION: LOCATION: CHEST

## 2024-10-20 ASSESSMENT — PAIN DESCRIPTION - ORIENTATION: ORIENTATION: LEFT

## 2024-10-20 ASSESSMENT — PAIN DESCRIPTION - PAIN TYPE: TYPE: ACUTE PAIN

## 2024-10-20 NOTE — H&P
History Of Present Illness  Arnaldo Balderrama is a 54 y.o. male w/ PMHx of CAD on Plavix, s/p CABG, HLD, HTN, DMII, GERD  presenting with chest pain and sob. The pt mentions that yesterday he noticed sob when he was walking up and down the steps. This is not usual for him.  He also says that around the same time he was having some minor chest pressure and discomfort in his left arm. The patient reports a high pain tolerance. He said these symptoms did not bother him all that much. 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 What prompted him to come in was that he was unable to sleep. He mentions that he had a similar instance before his CABG. Last night he was able to get an hour of sleep here and there but would repeatedly wake up. During time of exam the patient denies any chest pain. He says that he only feels his sob when he moves. He reports feeling sob when he got up to use the restroom. The pt also states that his left arm pain is still present but has changed to a sharp feeling at the bottom of his bicep. He attributes this new pain to his IV. His only other complaint is diarrhea, however he reports that this is a chronic issue. Denies any fever, chills nausea or vomiting.    12 point Review of systems negative unless stated above.     ED Course  Vitals:     36.6 (97.9) 90 20 170/115 Abnormal  94 None (Room air) --     Labs:  Glucose: 176  Magnesium: 0.96  BNP: 521  Troponin 514    Imaging:  EKG: NSR, TWI I, aVL, and V1-V2   CTAP: No PE, diffuse ground glass infiltrate suspicious for pulmonary edema     Interventions:   mg   Lasix 20 mg  Magnesium Sulfate 2g  Nitroglycerin patch    Past Medical History  He has a past medical history of Atherosclerosis of native coronary artery of native heart without angina pectoris, Chronic gout without tophus, unspecified cause, unspecified site, Hyperlipidemia, Hypothyroidism, unspecified (12/02/2020),  Other fatigue (12/02/2020), Personal history of other diseases of the musculoskeletal system and connective tissue (09/24/2019), Personal history of other specified conditions (10/09/2018), and Type 2 diabetes mellitus with diabetic polyneuropathy, without long-term current use of insulin.    Surgical History  He has a past surgical history that includes Coronary artery bypass graft (01/16/2018); Other surgical history (12/08/2022); Cardiac surgery (2017); Cardiac catheterization (03/2020); and Gallbladder surgery (06/2023).     Social History  He reports that he has never smoked. He has never been exposed to tobacco smoke. He has never used smokeless tobacco. He reports current alcohol use. He reports that he does not use drugs.    Family History  Family History   Problem Relation Name Age of Onset    Thyroid disease Mother      Diabetes Father      Heart disease Father      Thyroid disease Father      Hypertension Sibling          Allergies  Patient has no known allergies.    Review of Systems   Respiratory:  Positive for chest tightness and shortness of breath.    Cardiovascular:  Positive for chest pain and leg swelling.   Gastrointestinal:  Positive for diarrhea.   Psychiatric/Behavioral:  Positive for sleep disturbance.    All other systems reviewed and are negative.       Physical Exam  Constitutional:       Appearance: Normal appearance. He is normal weight.   HENT:      Head: Normocephalic.      Nose: Nose normal.      Mouth/Throat:      Mouth: Mucous membranes are moist.   Eyes:      Extraocular Movements: Extraocular movements intact.      Conjunctiva/sclera: Conjunctivae normal.      Pupils: Pupils are equal, round, and reactive to light.   Cardiovascular:      Rate and Rhythm: Normal rate and regular rhythm.      Pulses: Normal pulses.      Heart sounds: Normal heart sounds.   Pulmonary:      Effort: Pulmonary effort is normal.      Breath sounds: Normal breath sounds.   Abdominal:      General: Bowel  sounds are normal. There is distension.      Palpations: Abdomen is soft.   Musculoskeletal:         General: Tenderness (RUE near IV) present. Normal range of motion.      Right lower leg: Edema present.      Left lower leg: Edema present.   Skin:     General: Skin is warm.   Neurological:      General: No focal deficit present.      Mental Status: He is alert and oriented to person, place, and time. Mental status is at baseline.   Psychiatric:         Mood and Affect: Mood normal.         Behavior: Behavior normal.         Thought Content: Thought content normal.         Judgment: Judgment normal.          Last Recorded Vitals  /90   Pulse 85   Temp 36.2 °C (97.2 °F)   Resp 24   Wt 103 kg (228 lb)   SpO2 93%     Relevant Results        Scheduled medications  [START ON 10/21/2024] aspirin, 81 mg, oral, Daily  atorvastatin, 40 mg, oral, Nightly  cholecalciferol, 2,000 Units, oral, Daily  clopidogrel, 75 mg, oral, Daily  ezetimibe, 10 mg, oral, Nightly  furosemide, 40 mg, intravenous, q12h  insulin lispro, 0-10 Units, subcutaneous, Before meals & nightly  isosorbide mononitrate ER, 30 mg, oral, Nightly  lisinopril, 20 mg, oral, Daily  metoprolol tartrate, 25 mg, oral, BID  [START ON 10/21/2024] pantoprazole, 20 mg, oral, Daily before breakfast  perflutren lipid microspheres, 0.5-10 mL of dilution, intravenous, Once in imaging  perflutren protein A microsphere, 0.5 mL, intravenous, Once in imaging  sulfur hexafluoride microsphr, 2 mL, intravenous, Once in imaging  ticagrelor, 180 mg, oral, Once   Followed by  ticagrelor, 90 mg, oral, BID      Continuous medications  heparin, 0-4,000 Units/hr, Last Rate: 1,000 Units/hr (10/20/24 1101)      PRN medications    Results for orders placed or performed during the hospital encounter of 10/20/24 (from the past 24 hours)   CBC and Auto Differential   Result Value Ref Range    WBC 10.0 4.4 - 11.3 x10*3/uL    nRBC 0.0 0.0 - 0.0 /100 WBCs    RBC 5.23 4.50 - 5.90  x10*6/uL    Hemoglobin 15.0 13.5 - 17.5 g/dL    Hematocrit 44.7 41.0 - 52.0 %    MCV 86 80 - 100 fL    MCH 28.7 26.0 - 34.0 pg    MCHC 33.6 32.0 - 36.0 g/dL    RDW 13.2 11.5 - 14.5 %    Platelets 274 150 - 450 x10*3/uL    Neutrophils % 64.4 40.0 - 80.0 %    Immature Granulocytes %, Automated 0.6 0.0 - 0.9 %    Lymphocytes % 25.9 13.0 - 44.0 %    Monocytes % 7.5 2.0 - 10.0 %    Eosinophils % 1.1 0.0 - 6.0 %    Basophils % 0.5 0.0 - 2.0 %    Neutrophils Absolute 6.47 1.20 - 7.70 x10*3/uL    Immature Granulocytes Absolute, Automated 0.06 0.00 - 0.70 x10*3/uL    Lymphocytes Absolute 2.60 1.20 - 4.80 x10*3/uL    Monocytes Absolute 0.75 0.10 - 1.00 x10*3/uL    Eosinophils Absolute 0.11 0.00 - 0.70 x10*3/uL    Basophils Absolute 0.05 0.00 - 0.10 x10*3/uL   Comprehensive Metabolic Panel   Result Value Ref Range    Glucose 176 (H) 74 - 99 mg/dL    Sodium 137 136 - 145 mmol/L    Potassium 4.1 3.5 - 5.3 mmol/L    Chloride 102 98 - 107 mmol/L    Bicarbonate 26 21 - 32 mmol/L    Anion Gap 13 10 - 20 mmol/L    Urea Nitrogen 9 6 - 23 mg/dL    Creatinine 0.86 0.50 - 1.30 mg/dL    eGFR >90 >60 mL/min/1.73m*2    Calcium 8.3 (L) 8.6 - 10.3 mg/dL    Albumin 3.8 3.4 - 5.0 g/dL    Alkaline Phosphatase 60 33 - 120 U/L    Total Protein 6.9 6.4 - 8.2 g/dL    AST 22 9 - 39 U/L    Bilirubin, Total 0.7 0.0 - 1.2 mg/dL    ALT 30 10 - 52 U/L   Magnesium   Result Value Ref Range    Magnesium 0.96 (L) 1.60 - 2.40 mg/dL   Troponin I, High Sensitivity, Initial   Result Value Ref Range    Troponin I, High Sensitivity 514 (HH) 0 - 20 ng/L   B-Type Natriuretic Peptide   Result Value Ref Range     (H) 0 - 99 pg/mL   Troponin, High Sensitivity, 1 Hour   Result Value Ref Range    Troponin I, High Sensitivity 427 (HH) 0 - 20 ng/L   Protime-INR   Result Value Ref Range    Protime 12.0 9.8 - 12.8 seconds    INR 1.1 0.9 - 1.1   APTT   Result Value Ref Range    aPTT 35 27 - 38 seconds     CT angio chest for pulmonary embolism    Result Date:  10/20/2024  Interpreted By:  Indira Dewey, STUDY: CT ANGIO CHEST FOR PULMONARY EMBOLISM;  10/20/2024 10:45 am   INDICATION: Signs/Symptoms:sob and cp.   COMPARISON: 04/24/2024 CT chest   ACCESSION NUMBER(S): [id number]   ORDERING CLINICIAN: ARNALDO PETERSON   TECHNIQUE: Helical data acquisition of the chest was obtained after intravenous administration of 75 cc of Omnipaque 350 as per PE protocol. Images were reformatted in coronal and sagittal planes. Axial and coronal maximum intensity projection (MIP) images were created and reviewed.   All CT exams are performed with 1 or more of the following dose reduction techniques: Automatic exposure control, adjustment of mA and/or kv according to patient size, or use of iterative reconstruction techniques.   FINDINGS: Pulmonary arteries: There are no CT signs of pulmonary embolism to the segmental level.   Aorta: Aorta is normal in size. No aortic dissection noted.   Heart: Cardiomegaly noted with sternal wires no pericardial effusion.   Lymph nodes: No pathologically enlarged lymph nodes.   Lungs: There are diffuse ground-glass infiltrates throughout both lungs. Images are degraded by patient respiratory motion artifact. Minimal dependent atelectasis in the lung bases.   Pleura: Small bilateral pleural effusions.   Chest wall/bones: No acute bony pathology noted.   Upper abdomen:Mild reflux of contrast into hepatic veins. Gallbladder surgically absent.       1. No CT signs of pulmonary embolism. 2. Diffuse ground-glass infiltrates suspicious for pulmonary edema. Correlate clinically. Suspected signs of CHF. Small bilateral pleural effusions     MACRO: None   Signed by: Indira Dewey 10/20/2024 11:17 AM Dictation workstation:   [id number]     Assessment/Plan   Assessment & Plan  NSTEMI (non-ST elevated myocardial infarction) (Multi)    Arnaldo Balderrama is a 54 y.o. male w/ PMHx of CAD on Plavix, s/p CABG, HLD, HTN, DMII, GERD  presenting with chest pain and sob  Acute  Medical Issues  #NSTEMI  #Acute on chronic diastolic HF mild  #HLD  - CTAP: showed No PE, diffuse ground glass infiltrate suspicious for pulmonary edema.  - Initial troponin 514 -> 427  - Initial EKG showed   - NOLVIA score (5), HEART score (7)  - ED intervention: ASA, Lasix, Magnesium Sulfate, Nitroglycerin patch  PLAN:  - Brillinta load 180 mg once given on floor; Will start Brillinta 90 mg BID  - Aspirin 81mg daily, loaded with 324mg  - Atorvastatin increased to 40 mg at BID,   - Ezetimibe 10 mg and imdur 30mg continued  - ECHO ordered to assess cardiovascular status  - trend troponin x3 and EKGs  - Lipid panel ordered, last A1c 8.3  - telemetry, supplemental oxygen as needed to ensure O2 saturation >90%  - Cardio following, plan cath tomorrow  - NPO at midnight for cath    Chronic Medical Issues   #DMII- SSI  #HTN- continue Lisinopril  #CAD- holding plavix since pt is on Brilinta  #GERD- protonix as above    Disposition: Patient was admitted for STEMI. Cath planned for tomorrow. eLOS <48hours  Sohan Koroma, DO

## 2024-10-20 NOTE — LETTER
October 21, 2024     Patient: Arnaldo Balderrama   YOB: 1970   Date of Visit: 10/20/2024       To Whom It May Concern:    Arnaldo Balderrama was to [facility] on 10/20/2024 for NSTEMI/chest pain. Please excuse Arnaldo for his absence from work for additional 7-14 days after his discharge from the hospital to provide full recovery.     If you have any questions or concerns, please don't hesitate to call.       Sincerely,     Dr. Hackett and Dr. Alfonso  Your internal medicine team

## 2024-10-20 NOTE — CARE PLAN
The patient's goals for the shift include  to not have any chest pain    The clinical goals for the shift include Be free of chest pain

## 2024-10-20 NOTE — ED PROCEDURE NOTE
Procedure  Critical Care    Performed by: Arnaldo Robins MD  Authorized by: Arnaldo Robins MD    Critical care provider statement:     Critical care time (minutes):  30    Critical care time was exclusive of:  Separately billable procedures and treating other patients    Critical care was necessary to treat or prevent imminent or life-threatening deterioration of the following conditions:  Cardiac failure    Critical care was time spent personally by me on the following activities:  Ordering and performing treatments and interventions, ordering and review of laboratory studies, ordering and review of radiographic studies, development of treatment plan with patient or surrogate, evaluation of patient's response to treatment, pulse oximetry, re-evaluation of patient's condition, examination of patient and obtaining history from patient or surrogate    Care discussed with: admitting provider                 Arnaldo Robins MD  10/20/24 103

## 2024-10-20 NOTE — HOSPITAL COURSE
Arnaldo Balderrama is a 54 y.o. male with a PMH of CAD on Plavix, s/p CABG, HLD, HTN, T2DM, GERD, who presented with chest pain and shortness of breath. Admitted for NSTEMI. Cardiology consulted and planned LHC on 10/21/24.   Patient was pre-treated with Brilinta 180mg was given followed by 90 mg BID, Aspirin 81 mg daily, Atorvastatin 80 mg. Additionally, started on Metoprolol tartrate 25 mg BID per cardio recs. ECHO ordered and reviewed by cardiology.     Cardiology recommendations post-LHC on 10/21:  - to increase Imdur to 60 mg daily  - to continue Furosemide 20 mg daily  - to continue Atorvastatin 80 mg nightly, Zetioa 10 mg nightly, Lisinopril 20 mg daily, Metoprolol succinate 25 mg daily, Plavix 75 mg daily  - follow up scheduled 11/6/24    Patient was hemodynamically stable for discharge on 10/21/24.

## 2024-10-20 NOTE — CONSULTS
Inpatient consult to Cardiology  Consult performed by: PHOENIX Gore-CNP  Consult ordered by: Arnaldo Robins MD        History Of Present Illness:    Arnaldo Balderrama is a 54 y.o. male from home with h/o CAD s/p CABG x4 12/2017 (LIMA to LAD, SVG to PDA, SVG to OM, SVG to Diag attached proximally to the zaman of OM), [facility] 2020 with 4/4 patent grafts, htn, type 2 DM, hld admitted with NSTEMI.  Developed chest pressure midsternally yesterday with dyspnea on exertion.  Does note mild LE edema and +orthopnea.  Troponin 514 in ER.  CTA chest negative for PE, shows vascular congestion. Reviewed .      Last Recorded Vitals:  Vitals:    10/20/24 1115 10/20/24 1145 10/20/24 1200 10/20/24 1215   BP:   130/90    BP Location:       Patient Position:       Pulse: 86 83 86 90   Resp: (!) 26 (!) 27 (!) 26 (!) 21   Temp:       TempSrc:       SpO2: 95% 97% 96% 95%   Weight:       Height:           Last Labs:  CBC - 10/20/2024:  9:58 AM  10.0 15.0 274    44.7      CMP - 10/20/2024:  9:58 AM  8.3 6.9 22 --- 0.7   _ 3.8 30 60      PTT - 10/20/2024: 11:54 AM  1.1   12.0 35     Troponin I, High Sensitivity   Date/Time Value Ref Range Status   10/20/2024 10:59  (HH) 0 - 20 ng/L Final     Comment:     Previous result verified on 10/20/2024 1029 on specimen/case [id number] called with component [facility] for procedure Troponin I, High Sensitivity, Initial with value 514 ng/L.   10/20/2024 09:58  (HH) 0 - 20 ng/L Final     BNP   Date/Time Value Ref Range Status   10/20/2024 09:58  (H) 0 - 99 pg/mL Final   12/11/2017 03:39  (H) 0 - 99 pg/mL Final     Comment:     .  <100 pg/mL - Heart failure unlikely  100-299 pg/mL - Intermediate probability of acute heart  .               failure exacerbation. Correlate with clinical  .               context and patient history.    >=300 pg/mL - Heart Failure likely. Correlate with clinical  .               context and patient history.  BNP testing is performed using  different testing   methodology at [facility] than at other   [facility]. Direct result comparisons should   only be made within the same method.       POC HEMOGLOBIN A1c   Date/Time Value Ref Range Status   10/01/2024 03:37 PM 8.3 (A) 4.2 - 6.5 % Final   06/21/2024 03:59 PM 7.5 (A) 4.2 - 6.5 % Final     LDL Calculated   Date/Time Value Ref Range Status   12/29/2023 03:47 PM 64 (L) 65 - 130 mg/dL Final   06/12/2023 03:04 PM 59 (L) 65 - 130 MG/DL Final   01/06/2022 04:33 PM 88 65 - 130 MG/DL Final   10/07/2020 04:02 PM 69 65 - 130 MG/DL Final     VLDL   Date/Time Value Ref Range Status   09/24/2022 09:05 AM 24 0 - 40 mg/dL Final   06/18/2022 09:09 AM 29 0 - 40 mg/dL Final   12/05/2020 09:38 AM 15 0 - 40 mg/dL Final      Last I/O:  No intake/output data recorded.    Past Cardiology Tests (Last 3 Years):  EKG:  NSR, TWI I, aVL, and V1-V2     Echo:  Echocardiogram 4/7/2022  1. The left ventricular systolic function is normal with a 55-60% estimated ejection fraction.  2. There is trace mitral and tricuspid regurgitation.     Echo 1/19/18:   1. The left ventricular systolic function is low normal with a 50-55% estimated  ejection fraction.   2. Abnormal septal motion consistent with post-operative status.   3. Spectral Doppler shows an impaired relaxation pattern of left ventricular  diastolic filling.   4. There is mild mitral and tricuspid regurgitation.     Echo 12/14/17:   1. The left ventricular systolic function is severely decreased with a 25-30%  estimated ejection fraction.   2. Poorly visualized anatomical structures due to suboptimal image quality.   3. Spectral Doppler shows a restrictive pattern of left ventricular diastolic  filling.   4. RVSP within normal limits.    Cath:  Cath 2/21/2020:  1. Left main: 30% distal stenosis.  2. LAD: 95% diffuse prox-mid disease.  3. LCx: 90% diffuse prox-mid disease.  4. Ramus 80% proximal disease.  5. RCA: 80% diffuse prox-mid disease.  6. 4/4 patent  grafts: (1) LIMA to LAD (2) sequential SVG to OM2-D1 (3) SVG to  rPDA.  7. LVEDP 9mmHg, no aortic stenosis on LV-Ao gradient.     Stress Test:  No results found for this or any previous visit from the past 1095 days.    Cardiac Imaging:  No results found for this or any previous visit from the past 1095 days.      Past Medical History:  He has a past medical history of Atherosclerosis of native coronary artery of native heart without angina pectoris, Chronic gout without tophus, unspecified cause, unspecified site, Hyperlipidemia, Hypothyroidism, unspecified (12/02/2020), Other fatigue (12/02/2020), Personal history of other diseases of the musculoskeletal system and connective tissue (09/24/2019), Personal history of other specified conditions (10/09/2018), and Type 2 diabetes mellitus with diabetic polyneuropathy, without long-term current use of insulin.    Past Surgical History:  He has a past surgical history that includes Coronary artery bypass graft (01/16/2018); Other surgical history (12/08/2022); Cardiac surgery (2017); Cardiac catheterization (03/2020); and Gallbladder surgery (06/2023).      Social History:  He reports that he has never smoked. He has never been exposed to tobacco smoke. He has never used smokeless tobacco. He reports current alcohol use. He reports that he does not use drugs.    Family History:  Family History   Problem Relation Name Age of Onset    Thyroid disease Mother      Diabetes Father      Heart disease Father      Thyroid disease Father      Hypertension Sibling          Allergies:  Patient has no known allergies.    Inpatient Medications:  Scheduled medications   Medication Dose Route Frequency    magnesium sulfate  2 g intravenous Once     PRN medications   Medication     Continuous Medications   Medication Dose Last Rate    heparin  0-4,000 Units/hr 1,000 Units/hr (10/20/24 1101)     Outpatient Medications:  Current Outpatient Medications   Medication Instructions    aspirin  81 mg EC tablet 1 tablet, oral, Daily    atorvastatin (LIPITOR) 40 mg, oral, Daily    cholecalciferol (Vitamin D3) 50 mcg (2,000 unit) capsule TAKE ONE CAPSULE BY MOUTH DAILY    clopidogrel (PLAVIX) 75 mg, oral, Daily    ezetimibe (Zetia) 10 mg tablet     glipiZIDE (GLUCOTROL) 5 mg, oral, 2 times daily before meals    isosorbide mononitrate ER (IMDUR) 30 mg, oral, Nightly    Jardiance 25 mg, oral, Daily    lisinopril 20 mg, oral, Daily    metFORMIN (Glucophage) 500 mg tablet take 2 tablets by mouth (1000mg) by mouth 2 times a day with meals.    omeprazole (PriLOSEC) 20 mg DR capsule oral       Physical Exam:  Constitutional: Pleasant, Awake/Alert/Oriented to person place and time. No distress  Head: Atraumatic, Normocephalic  Eyes: EOMI. SOBEIDA  Neck: +JVD  Cardiovascular: Regular rate and rhythm, S1, S2. No extra heart sounds or murmurs  Respiratory: Crackles noted posteriorly at bases   Abdomen: Soft, Nontender. Bowel sounds appreciated  Musculoskeletal: ROM intact. Muscle strength grossly intact upper and lower extremities 5/5.   Neurological: CNII-XII intact. Sensation grossly intact  Extremities: Warm and dry. Trace edema BLE   Psychiatric: Appropriate mood and affect       Assessment/Plan   54 y.o. male from home with h/o CAD s/p CABG x4 12/2017 (LIMA to LAD, SVG to PDA, SVG to OM, SVG to Diag attached proximally to the zaman of OM), [facility] 2020 with 4/4 patent grafts, htn, type 2 DM, hld admitted with NSTEMI.    Assessment:  NSTEMI  Acute decompensated HF, echo pending, NYHA III  Hypertensive urgency     Plan:  -NPO after midnight for [facility] tomorrow  -Will obtain an echocardiogram for assessment of mechanical and structural function  -Recommend lasix 40mg IV BID  -Continue heparin per ACS protocol  -Continue aspirin 81mg daily and start Brilnta 180mg x1, then 90mg BID  -Recommend HI statin, lisinopril, imdur, zetia   -Start low dose BB therapy-- metoprolol tartrate 25mg BID     Peripheral IV 10/20/24 20 G Left  Antecubital (Active)   Site Assessment Clean;Dry;Intact 10/20/24 1020   Dressing Type Transparent 10/20/24 1020   Line Status Flushed 10/20/24 1020   Dressing Status Clean;Dry;Occlusive 10/20/24 1020   Number of days: 0       Code Status:  No Order        Aminata Ross, PHOENIX-CNP

## 2024-10-20 NOTE — ED PROVIDER NOTES
HPI   Chief Complaint   Patient presents with    Chest Pain       Is a 54-year-old male coming in for chest pain and shortness of breath.  States that his symptoms started yesterday.  He does have a cardiac history including a CABG and sees Dr. Schmidt.  Patient reports that when he had a previous heart attack he just felt tired.  He did not have similar symptoms to this.  Yesterday patient reports that he started developing a tight discomfort in his chest but also describes as a soreness.  He felt very short of breath and includes that he was unable to walk to and from his car from locations and had to stop and rest to catch his breath.  This is an acute change for him.  He denies any nausea currently but did have some yesterday.  He also reports that he had pain in his left arm.  He has not had any cough congestion URI symptoms.  No vomiting or diarrhea.  He is eating drinking as per his usual.  He has been taking all of his medications but was recently started on glipizide however he has not started taking it yet.      History provided by:  Patient and spouse          Patient History   Past Medical History:   Diagnosis Date    Atherosclerosis of native coronary artery of native heart without angina pectoris     Chronic gout without tophus, unspecified cause, unspecified site     Hyperlipidemia     Hypothyroidism, unspecified 12/02/2020    Primary hypothyroidism    Other fatigue 12/02/2020    Tiredness    Personal history of other diseases of the musculoskeletal system and connective tissue 09/24/2019    History of gout    Personal history of other specified conditions 10/09/2018    History of nausea and vomiting    Type 2 diabetes mellitus with diabetic polyneuropathy, without long-term current use of insulin      Past Surgical History:   Procedure Laterality Date    CARDIAC CATHETERIZATION  03/2020    CARDIAC SURGERY  2017    CORONARY ARTERY BYPASS GRAFT  01/16/2018    CABG    GALLBLADDER SURGERY  06/2023     OTHER SURGICAL HISTORY  12/08/2022    Cataract surgery     Family History   Problem Relation Name Age of Onset    Thyroid disease Mother      Diabetes Father      Heart disease Father      Thyroid disease Father      Hypertension Sibling       Social History     Tobacco Use    Smoking status: Never     Passive exposure: Never    Smokeless tobacco: Never   Vaping Use    Vaping status: Never Used   Substance Use Topics    Alcohol use: Yes     Comment: SOCIAL    Drug use: Never       Physical Exam   ED Triage Vitals [10/20/24 0941]   Temperature Heart Rate Respirations BP   36.6 °C (97.9 °F) 90 20 (!) 170/115      Pulse Ox Temp Source Heart Rate Source Patient Position   94 % Temporal Monitor --      BP Location FiO2 (%)     -- --       Physical Exam  Vitals and nursing note reviewed.   Constitutional:       General: He is not in acute distress.     Appearance: Normal appearance. He is obese. He is not toxic-appearing.   HENT:      Head: Normocephalic and atraumatic.      Nose: Nose normal.      Mouth/Throat:      Mouth: Mucous membranes are moist.      Pharynx: Oropharynx is clear.   Eyes:      Extraocular Movements: Extraocular movements intact.      Conjunctiva/sclera: Conjunctivae normal.      Pupils: Pupils are equal, round, and reactive to light.   Cardiovascular:      Rate and Rhythm: Normal rate and regular rhythm.      Pulses: Normal pulses.      Heart sounds: Normal heart sounds.   Pulmonary:      Effort: Pulmonary effort is normal. No respiratory distress.      Breath sounds: Normal breath sounds.   Abdominal:      General: Abdomen is flat. Bowel sounds are normal.      Palpations: Abdomen is soft.      Tenderness: There is no abdominal tenderness.   Musculoskeletal:         General: Normal range of motion.      Cervical back: Normal range of motion and neck supple.   Skin:     General: Skin is warm and dry.      Coloration: Skin is not jaundiced or pale.      Findings: No bruising.   Neurological:       General: No focal deficit present.      Mental Status: He is alert and oriented to person, place, and time. Mental status is at baseline.   Psychiatric:         Mood and Affect: Mood normal.         Behavior: Behavior normal.           ED Course & MDM   ED Course as of 10/20/24 1130   Sun Oct 20, 2024   0954 EKG completed at 937 shows on my interpretation sinus rhythm with PVC.  Ventricular rate of 90 bpm.  T wave depression that appears new compared to prior in leads V4 5 and 6. QTC 425ms [RS]      ED Course User Index  [RS] Arnaldo Gould PA-C         Diagnoses as of 10/20/24 1130   NSTEMI (non-ST elevated myocardial infarction) (Multi)   Hypomagnesemia                 No data recorded     [place] Coma Scale Score: 15 (10/20/24 0942 : Damian Kay RN) HEART Score: 5 (10/20/24 0947 : Arnaldo Gould PA-C)                         Medical Decision Making  Summary:  Medical Decision Making:   Patient presented as described in HPI. Patient case including ROS, PE, and treatment and plan discussed with ED attending if attached as cosigner. Results from labs and or imaging included below if completed. Arnaldo Balderrama  is a 54 y.o. coming in for Patient presents with:  Chest Pain  .  Cardiac workup will be completed on the patient.  He does have an elevated heart score of 5 prior to troponins coming back.  He does have some new T wave depressions on his EKG.  He was given 324 aspirin as well as Nitropaste during the initiation of the workup.  A CT PE study will be completed on the patient and he would likely be hospitalized.  Patient's troponin did come back elevated with the first draw 514.  Patient also has hypomagnesemia that is less than 1 and is replenished IV.  Spoke with patient's cardiologist, Dr. Schmidt who is made aware of the findings.  He agrees with placing the patient on heparin.  He will consult on the patient.  PE scan was completed and shows no signs of pulmonary embolism however there is signs of  congestive heart failure.  BNP was slightly elevated.  He was given 20 mg IV Lasix.  Second troponin is pending however patient was accepted by the hospitalist for further care and treatment of his NSTEMI.  Pt was staffed with attending and did a separate note.       Shared decision making was completed for required hospital stay. I also explained the plan and treatment course. Patient/guardian is in agreement with plan, treatment course, and state that they will comply.    Labs Reviewed  COMPREHENSIVE METABOLIC PANEL - Abnormal     Glucose                       176 (*)                Sodium                        137                    Potassium                     4.1                    Chloride                      102                    Bicarbonate                   26                     Anion Gap                     13                     Urea Nitrogen                 9                      Creatinine                    0.86                   eGFR                          >90                    Calcium                       8.3 (*)                Albumin                       3.8                    Alkaline Phosphatase          60                     Total Protein                 6.9                    AST                           22                     Bilirubin, Total              0.7                    ALT                           30                  MAGNESIUM - Abnormal     Magnesium                     0.96 (*)            SERIAL TROPONIN-INITIAL - Abnormal     Troponin I, High Sensitivity   514 (*)                  Narrative: Less than 99th percentile of normal range cutoff-                Female and children under 18 years old <14 ng/L; Male <21 ng/L: Negative                Repeat testing should be performed if clinically indicated.                                 Female and children under 18 years old 14-50 ng/L; Male 21-50 ng/L:                Consistent with possible cardiac damage and possible  increased clinical                 risk. Serial measurements may help to assess extent of myocardial damage.                                 >50 ng/L: Consistent with cardiac damage, increased clinical risk and                myocardial infarction. Serial measurements may help assess extent of                 myocardial damage.                                  NOTE: Children less than 1 year old may have higher baseline troponin                 levels and results should be interpreted in conjunction with the overall                 clinical context.                                 NOTE: Troponin I testing is performed using a different                 testing methodology at [facility] than at other                 [facility]. Direct result comparisons should only                 be made within the same method.  B-TYPE NATRIURETIC PEPTIDE - Abnormal     BNP                           521 (*)                  Narrative:    <100 pg/mL - Heart failure unlikely                100-299 pg/mL - Intermediate probability of acute heart                                failure exacerbation. Correlate with clinical                                context and patient history.                  >=300 pg/mL - Heart Failure likely. Correlate with clinical                                context and patient history.                                BNP testing is performed using different testing methodology at [facility] than at other [facility]. Direct result comparisons should only be made within the same method.                   SERIAL TROPONIN, 1 HOUR - Abnormal     Troponin I, High Sensitivity   427 (*)                  Narrative: Less than 99th percentile of normal range cutoff-                Female and children under 18 years old <14 ng/L; Male <21 ng/L: Negative                Repeat testing should be performed if clinically indicated.                                 Female and children  under 18 years old 14-50 ng/L; Male 21-50 ng/L:                Consistent with possible cardiac damage and possible increased clinical                 risk. Serial measurements may help to assess extent of myocardial damage.                                 >50 ng/L: Consistent with cardiac damage, increased clinical risk and                myocardial infarction. Serial measurements may help assess extent of                 myocardial damage.                                  NOTE: Children less than 1 year old may have higher baseline troponin                 levels and results should be interpreted in conjunction with the overall                 clinical context.                                 NOTE: Troponin I testing is performed using a different                 testing methodology at [facility] than at other                 [facility]. Direct result comparisons should only                 be made within the same method.  TROPONIN SERIES- (INITIAL, 1 HR)       Narrative: The following orders were created for panel order Troponin I Series, High Sensitivity (0, 1 HR).                Procedure                               Abnormality         Status                                   ---------                               -----------         ------                                   Troponin I, High Sensiti...[[id number]]  Abnormal            Final result                             Troponin, High Sensitivi...[[id number]]  Abnormal            Final result                                             Please view results for these tests on the individual orders.  CBC WITH AUTO DIFFERENTIAL     WBC                           10.0                   nRBC                          0.0                    RBC                           5.23                   Hemoglobin                    15.0                   Hematocrit                    44.7                   MCV                           86                      MCH                           28.7                   MCHC                          33.6                   RDW                           13.2                   Platelets                     274                    Neutrophils %                 64.4                   Immature Granulocytes %, Automated   0.6                    Lymphocytes %                 25.9                   Monocytes %                   7.5                    Eosinophils %                 1.1                    Basophils %                   0.5                    Neutrophils Absolute          6.47                   Immature Granulocytes Absolute, Au*   0.06                   Lymphocytes Absolute          2.60                   Monocytes Absolute            0.75                   Eosinophils Absolute          0.11                   Basophils Absolute            0.05                PROTIME-INR  APTT  TROPONIN I, HIGH SENSITIVITY   CT angio chest for pulmonary embolism   Final Result    1. No CT signs of pulmonary embolism.    2. Diffuse ground-glass infiltrates suspicious for pulmonary edema.    Correlate clinically. Suspected signs of CHF. Small bilateral pleural    effusions                MACRO:    None          Signed by: Indira Dewey 10/20/2024 11:17 AM    Dictation workstation:   [id number]              HEART Score: 5             Tests/Medications/Escalations of Care considered but not given: As in MDM    Patient care discussed with: N/A  Social Determinants affecting care: N/A    Final diagnosis and disposition as documented          Shared decision making was completed and determined that patient will be hospitalized. I discussed the differential; results and admit plan with the patient and/or family/friend/caregiver if present.  I emphasized the importance of hospitalization need for re-evaluation/continued monitoring/interventions. They agreed that if they feel their condition is worsening or if they have any other concern they should  alert staff immediately for further assistance. I gave the patient an opportunity to ask all questions they had and answered all of them accordingly. The patient and/or family/friend/caregiver expressed understanding verbally and that they would comply.    Disposition:  Hospitalize to [facility] floor under Dr. Alfonso per their orders. Discussed findings and treatment done here in ED with admitting physician. It would be a risk to discharge the patient in their condition due to possibility of deterioration in their condition and the need for urgent interventions.    This note has been transcribed using voice recognition and may contain grammatical errors, misplaced words, incorrect words, incorrect phrases or other errors.         Procedure  Procedures     Arnaldo Gould PA-C  10/20/24 120

## 2024-10-20 NOTE — ASSESSMENT & PLAN NOTE
Arnaldo Conchis is a 54 y.o. male w/ PMHx of CAD on Plavix, s/p CABG, HLD, HTN, DMII, GERD  presenting with chest pain and sob

## 2024-10-20 NOTE — ED PROVIDER NOTES
The patient was seen by the midlevel/resident.  I have personally saw the patient and made/approved the management plan and take responsibility for the patient management.  I reviewed the EKG's (when done) and agree with the interpretation.  I have seen and examined the patient; agree with the workup, evaluation, MDM, and diagnosis.  The care plan has been discussed with the midlevel/resident; I have reviewed the note and agree with the documented findings.     Patient presents with chest discomfort and some shortness of breath.  He has a history of coronary disease and sees Dr. Schmidt with previous MIs.  He has multiple risk factors including diabetes hypertension previous STEMI.  We worked up in ED with labs chest x-ray and we will give him some medicine lowers blood pressure.  He normally takes Plavix.  Historian is patient and significant other in the room as well as EMR.  Patient has elevated heart score will treat anticipate hospitalization.    ED Course as of 10/20/24 1837   Sun Oct 20, 2024   0954 EKG completed at 937 shows on my interpretation sinus rhythm with PVC.  Ventricular rate of 90 bpm.  T wave depression that appears new compared to prior in leads V4 5 and 6. QTC 425ms [RS]      ED Course User Index  [RS] Arnaldo Gould PA-C         Diagnoses as of 10/20/24 1837   NSTEMI (non-ST elevated myocardial infarction) (Multi)   Hypomagnesemia     MD Arnaldo Bradford MD  10/20/24 1837

## 2024-10-21 ENCOUNTER — APPOINTMENT (OUTPATIENT)
Dept: ENDOCRINOLOGY | Facility: CLINIC | Age: 54
End: 2024-10-21
Payer: COMMERCIAL

## 2024-10-21 ENCOUNTER — APPOINTMENT (OUTPATIENT)
Dept: CARDIOLOGY | Facility: HOSPITAL | Age: 54
End: 2024-10-21
Payer: COMMERCIAL

## 2024-10-21 VITALS
RESPIRATION RATE: 23 BRPM | BODY MASS INDEX: 34.56 KG/M2 | DIASTOLIC BLOOD PRESSURE: 88 MMHG | HEIGHT: 67 IN | TEMPERATURE: 97.2 F | WEIGHT: 220.2 LBS | OXYGEN SATURATION: 92 % | SYSTOLIC BLOOD PRESSURE: 136 MMHG | HEART RATE: 72 BPM

## 2024-10-21 PROBLEM — I21.4 NSTEMI (NON-ST ELEVATED MYOCARDIAL INFARCTION) (MULTI): Status: RESOLVED | Noted: 2024-10-20 | Resolved: 2024-10-21

## 2024-10-21 LAB
ALBUMIN SERPL BCP-MCNC: 3.9 G/DL (ref 3.4–5)
ANION GAP SERPL CALC-SCNC: 13 MMOL/L (ref 10–20)
ATRIAL RATE: 90 BPM
BUN SERPL-MCNC: 12 MG/DL (ref 6–23)
CALCIUM SERPL-MCNC: 9.1 MG/DL (ref 8.6–10.3)
CHLORIDE SERPL-SCNC: 99 MMOL/L (ref 98–107)
CO2 SERPL-SCNC: 29 MMOL/L (ref 21–32)
CREAT SERPL-MCNC: 0.97 MG/DL (ref 0.5–1.3)
EGFRCR SERPLBLD CKD-EPI 2021: >90 ML/MIN/1.73M*2
ERYTHROCYTE [DISTWIDTH] IN BLOOD BY AUTOMATED COUNT: 13 % (ref 11.5–14.5)
GLUCOSE BLD MANUAL STRIP-MCNC: 157 MG/DL (ref 74–99)
GLUCOSE BLD MANUAL STRIP-MCNC: 172 MG/DL (ref 74–99)
GLUCOSE BLD MANUAL STRIP-MCNC: 187 MG/DL (ref 74–99)
GLUCOSE SERPL-MCNC: 183 MG/DL (ref 74–99)
HCT VFR BLD AUTO: 46 % (ref 41–52)
HGB BLD-MCNC: 15.5 G/DL (ref 13.5–17.5)
MAGNESIUM SERPL-MCNC: 1.29 MG/DL (ref 1.6–2.4)
MCH RBC QN AUTO: 28.7 PG (ref 26–34)
MCHC RBC AUTO-ENTMCNC: 33.7 G/DL (ref 32–36)
MCV RBC AUTO: 85 FL (ref 80–100)
NRBC BLD-RTO: 0 /100 WBCS (ref 0–0)
P AXIS: 6 DEGREES
P OFFSET: 208 MS
P ONSET: 155 MS
PHOSPHATE SERPL-MCNC: 4.4 MG/DL (ref 2.5–4.9)
PLATELET # BLD AUTO: 262 X10*3/UL (ref 150–450)
POTASSIUM SERPL-SCNC: 4 MMOL/L (ref 3.5–5.3)
PR INTERVAL: 142 MS
Q ONSET: 226 MS
QRS COUNT: 15 BEATS
QRS DURATION: 84 MS
QT INTERVAL: 348 MS
QTC CALCULATION(BAZETT): 425 MS
QTC FREDERICIA: 398 MS
R AXIS: 10 DEGREES
RBC # BLD AUTO: 5.4 X10*6/UL (ref 4.5–5.9)
SODIUM SERPL-SCNC: 137 MMOL/L (ref 136–145)
T AXIS: 165 DEGREES
T OFFSET: 400 MS
UFH PPP CHRO-ACNC: 0.3 IU/ML
UFH PPP CHRO-ACNC: 0.4 IU/ML
VENTRICULAR RATE: 90 BPM
WBC # BLD AUTO: 8.9 X10*3/UL (ref 4.4–11.3)

## 2024-10-21 PROCEDURE — 2500000005 HC RX 250 GENERAL PHARMACY W/O HCPCS: Performed by: INTERNAL MEDICINE

## 2024-10-21 PROCEDURE — C1760 CLOSURE DEV, VASC: HCPCS | Performed by: INTERNAL MEDICINE

## 2024-10-21 PROCEDURE — 85027 COMPLETE CBC AUTOMATED: CPT

## 2024-10-21 PROCEDURE — 36415 COLL VENOUS BLD VENIPUNCTURE: CPT

## 2024-10-21 PROCEDURE — 93306 TTE W/DOPPLER COMPLETE: CPT

## 2024-10-21 PROCEDURE — 7100000009 HC PHASE TWO TIME - INITIAL BASE CHARGE: Performed by: INTERNAL MEDICINE

## 2024-10-21 PROCEDURE — 2780000003 HC OR 278 NO HCPCS: Performed by: INTERNAL MEDICINE

## 2024-10-21 PROCEDURE — 99239 HOSP IP/OBS DSCHRG MGMT >30: CPT

## 2024-10-21 PROCEDURE — 2720000007 HC OR 272 NO HCPCS: Performed by: INTERNAL MEDICINE

## 2024-10-21 PROCEDURE — 93459 L HRT ART/GRFT ANGIO: CPT | Performed by: INTERNAL MEDICINE

## 2024-10-21 PROCEDURE — G0269 OCCLUSIVE DEVICE IN VEIN ART: HCPCS | Performed by: INTERNAL MEDICINE

## 2024-10-21 PROCEDURE — 2500000004 HC RX 250 GENERAL PHARMACY W/ HCPCS (ALT 636 FOR OP/ED): Performed by: INTERNAL MEDICINE

## 2024-10-21 PROCEDURE — 99153 MOD SED SAME PHYS/QHP EA: CPT | Performed by: INTERNAL MEDICINE

## 2024-10-21 PROCEDURE — 83735 ASSAY OF MAGNESIUM: CPT

## 2024-10-21 PROCEDURE — 93306 TTE W/DOPPLER COMPLETE: CPT | Performed by: INTERNAL MEDICINE

## 2024-10-21 PROCEDURE — 93454 CORONARY ARTERY ANGIO S&I: CPT | Performed by: INTERNAL MEDICINE

## 2024-10-21 PROCEDURE — 82947 ASSAY GLUCOSE BLOOD QUANT: CPT

## 2024-10-21 PROCEDURE — 2500000004 HC RX 250 GENERAL PHARMACY W/ HCPCS (ALT 636 FOR OP/ED)

## 2024-10-21 PROCEDURE — 85520 HEPARIN ASSAY: CPT

## 2024-10-21 PROCEDURE — 2500000002 HC RX 250 W HCPCS SELF ADMINISTERED DRUGS (ALT 637 FOR MEDICARE OP, ALT 636 FOR OP/ED)

## 2024-10-21 PROCEDURE — 2550000001 HC RX 255 CONTRASTS: Performed by: INTERNAL MEDICINE

## 2024-10-21 PROCEDURE — 2500000001 HC RX 250 WO HCPCS SELF ADMINISTERED DRUGS (ALT 637 FOR MEDICARE OP)

## 2024-10-21 PROCEDURE — 7100000010 HC PHASE TWO TIME - EACH INCREMENTAL 1 MINUTE: Performed by: INTERNAL MEDICINE

## 2024-10-21 PROCEDURE — 99152 MOD SED SAME PHYS/QHP 5/>YRS: CPT | Performed by: INTERNAL MEDICINE

## 2024-10-21 PROCEDURE — 2500000004 HC RX 250 GENERAL PHARMACY W/ HCPCS (ALT 636 FOR OP/ED): Performed by: NURSE PRACTITIONER

## 2024-10-21 PROCEDURE — 2500000001 HC RX 250 WO HCPCS SELF ADMINISTERED DRUGS (ALT 637 FOR MEDICARE OP): Performed by: NURSE PRACTITIONER

## 2024-10-21 PROCEDURE — 84100 ASSAY OF PHOSPHORUS: CPT

## 2024-10-21 PROCEDURE — 2500000002 HC RX 250 W HCPCS SELF ADMINISTERED DRUGS (ALT 637 FOR MEDICARE OP, ALT 636 FOR OP/ED): Performed by: NURSE PRACTITIONER

## 2024-10-21 RX ORDER — ATORVASTATIN CALCIUM 80 MG/1
80 TABLET, FILM COATED ORAL NIGHTLY
Qty: 30 TABLET | Refills: 0 | Status: SHIPPED | OUTPATIENT
Start: 2024-10-21 | End: 2024-11-20

## 2024-10-21 RX ORDER — ACETAMINOPHEN 650 MG/1
650 SUPPOSITORY RECTAL EVERY 4 HOURS PRN
Status: DISCONTINUED | OUTPATIENT
Start: 2024-10-21 | End: 2024-10-21 | Stop reason: HOSPADM

## 2024-10-21 RX ORDER — FENTANYL CITRATE 50 UG/ML
INJECTION, SOLUTION INTRAMUSCULAR; INTRAVENOUS AS NEEDED
Status: DISCONTINUED | OUTPATIENT
Start: 2024-10-21 | End: 2024-10-21 | Stop reason: HOSPADM

## 2024-10-21 RX ORDER — ISOSORBIDE MONONITRATE 60 MG/1
60 TABLET, EXTENDED RELEASE ORAL NIGHTLY
Qty: 30 TABLET | Refills: 0 | Status: SHIPPED | OUTPATIENT
Start: 2024-10-21 | End: 2024-11-20

## 2024-10-21 RX ORDER — FUROSEMIDE 40 MG/1
20 TABLET ORAL DAILY
Status: DISCONTINUED | OUTPATIENT
Start: 2024-10-22 | End: 2024-10-21 | Stop reason: HOSPADM

## 2024-10-21 RX ORDER — LANOLIN ALCOHOL/MO/W.PET/CERES
800 CREAM (GRAM) TOPICAL ONCE
Status: DISCONTINUED | OUTPATIENT
Start: 2024-10-21 | End: 2024-10-21

## 2024-10-21 RX ORDER — CHOLECALCIFEROL (VITAMIN D3) 50 MCG
2000 TABLET ORAL DAILY
Qty: 30 TABLET | Refills: 0 | Status: SHIPPED | OUTPATIENT
Start: 2024-10-22 | End: 2024-11-21

## 2024-10-21 RX ORDER — LIDOCAINE HYDROCHLORIDE 20 MG/ML
INJECTION, SOLUTION INFILTRATION; PERINEURAL AS NEEDED
Status: DISCONTINUED | OUTPATIENT
Start: 2024-10-21 | End: 2024-10-21 | Stop reason: HOSPADM

## 2024-10-21 RX ORDER — ACETAMINOPHEN 160 MG/5ML
650 SOLUTION ORAL EVERY 4 HOURS PRN
Status: DISCONTINUED | OUTPATIENT
Start: 2024-10-21 | End: 2024-10-21 | Stop reason: HOSPADM

## 2024-10-21 RX ORDER — FUROSEMIDE 20 MG/1
20 TABLET ORAL DAILY
Qty: 30 TABLET | Refills: 0 | Status: SHIPPED | OUTPATIENT
Start: 2024-10-22 | End: 2024-11-21

## 2024-10-21 RX ORDER — ISOSORBIDE MONONITRATE 60 MG/1
60 TABLET, EXTENDED RELEASE ORAL NIGHTLY
Status: DISCONTINUED | OUTPATIENT
Start: 2024-10-21 | End: 2024-10-21 | Stop reason: HOSPADM

## 2024-10-21 RX ORDER — ATORVASTATIN CALCIUM 80 MG/1
80 TABLET, FILM COATED ORAL NIGHTLY
Status: DISCONTINUED | OUTPATIENT
Start: 2024-10-21 | End: 2024-10-21 | Stop reason: HOSPADM

## 2024-10-21 RX ORDER — MIDAZOLAM HYDROCHLORIDE 1 MG/ML
INJECTION, SOLUTION INTRAMUSCULAR; INTRAVENOUS AS NEEDED
Status: DISCONTINUED | OUTPATIENT
Start: 2024-10-21 | End: 2024-10-21 | Stop reason: HOSPADM

## 2024-10-21 RX ORDER — EZETIMIBE 10 MG/1
10 TABLET ORAL NIGHTLY
Qty: 30 TABLET | Refills: 0 | Status: SHIPPED | OUTPATIENT
Start: 2024-10-21 | End: 2024-11-20

## 2024-10-21 RX ORDER — PANTOPRAZOLE SODIUM 20 MG/1
20 TABLET, DELAYED RELEASE ORAL
Qty: 30 TABLET | Refills: 0 | Status: SHIPPED | OUTPATIENT
Start: 2024-10-22 | End: 2024-11-21

## 2024-10-21 RX ORDER — CLOPIDOGREL BISULFATE 75 MG/1
75 TABLET ORAL DAILY
Status: DISCONTINUED | OUTPATIENT
Start: 2024-10-22 | End: 2024-10-21 | Stop reason: HOSPADM

## 2024-10-21 RX ORDER — METOPROLOL SUCCINATE 25 MG/1
25 TABLET, EXTENDED RELEASE ORAL DAILY
Qty: 30 TABLET | Refills: 0 | Status: SHIPPED | OUTPATIENT
Start: 2024-10-22 | End: 2024-11-21

## 2024-10-21 RX ORDER — METOPROLOL SUCCINATE 25 MG/1
25 TABLET, EXTENDED RELEASE ORAL DAILY
Status: DISCONTINUED | OUTPATIENT
Start: 2024-10-22 | End: 2024-10-21 | Stop reason: HOSPADM

## 2024-10-21 RX ORDER — MAGNESIUM SULFATE HEPTAHYDRATE 40 MG/ML
2 INJECTION, SOLUTION INTRAVENOUS ONCE
Status: COMPLETED | OUTPATIENT
Start: 2024-10-21 | End: 2024-10-21

## 2024-10-21 RX ORDER — ACETAMINOPHEN 325 MG/1
650 TABLET ORAL EVERY 4 HOURS PRN
Status: DISCONTINUED | OUTPATIENT
Start: 2024-10-21 | End: 2024-10-21 | Stop reason: HOSPADM

## 2024-10-21 RX ADMIN — ASPIRIN 81 MG: 81 TABLET, COATED ORAL at 08:33

## 2024-10-21 RX ADMIN — PANTOPRAZOLE SODIUM 20 MG: 20 TABLET, DELAYED RELEASE ORAL at 08:33

## 2024-10-21 RX ADMIN — Medication 2000 UNITS: at 08:33

## 2024-10-21 RX ADMIN — LISINOPRIL 20 MG: 20 TABLET ORAL at 08:33

## 2024-10-21 RX ADMIN — FUROSEMIDE 40 MG: 10 INJECTION, SOLUTION INTRAMUSCULAR; INTRAVENOUS at 05:30

## 2024-10-21 RX ADMIN — INSULIN LISPRO 2 UNITS: 100 INJECTION, SOLUTION INTRAVENOUS; SUBCUTANEOUS at 07:51

## 2024-10-21 RX ADMIN — TICAGRELOR 90 MG: 90 TABLET ORAL at 08:34

## 2024-10-21 RX ADMIN — PERFLUTREN 1 ML OF DILUTION: 6.52 INJECTION, SUSPENSION INTRAVENOUS at 09:27

## 2024-10-21 RX ADMIN — METOPROLOL TARTRATE 25 MG: 25 TABLET, FILM COATED ORAL at 08:33

## 2024-10-21 RX ADMIN — MAGNESIUM SULFATE HEPTAHYDRATE 2 G: 2 INJECTION, SOLUTION INTRAVENOUS at 16:39

## 2024-10-21 RX ADMIN — HEPARIN SODIUM 1400 UNITS/HR: 10000 INJECTION, SOLUTION INTRAVENOUS at 04:26

## 2024-10-21 RX ADMIN — ACETAMINOPHEN 650 MG: 325 TABLET ORAL at 16:43

## 2024-10-21 ASSESSMENT — PAIN SCALES - GENERAL
PAINLEVEL_OUTOF10: 0 - NO PAIN
PAINLEVEL_OUTOF10: 3
PAINLEVEL_OUTOF10: 0 - NO PAIN
PAINLEVEL_OUTOF10: 3
PAINLEVEL_OUTOF10: 0 - NO PAIN
PAINLEVEL_OUTOF10: 0 - NO PAIN
PAINLEVEL_OUTOF10: 3
PAINLEVEL_OUTOF10: 0 - NO PAIN

## 2024-10-21 ASSESSMENT — COGNITIVE AND FUNCTIONAL STATUS - GENERAL
DAILY ACTIVITIY SCORE: 24
WALKING IN HOSPITAL ROOM: A LITTLE
MOBILITY SCORE: 21
TURNING FROM BACK TO SIDE WHILE IN FLAT BAD: A LITTLE
MOVING TO AND FROM BED TO CHAIR: A LITTLE

## 2024-10-21 ASSESSMENT — ENCOUNTER SYMPTOMS
ABDOMINAL PAIN: 0
DYSURIA: 0
NUMBNESS: 0
MYALGIAS: 0
WHEEZING: 0
NAUSEA: 0
CONFUSION: 0
EYE DISCHARGE: 0
SHORTNESS OF BREATH: 0
VOMITING: 0
DIFFICULTY URINATING: 0
DIZZINESS: 0
CHILLS: 0
FEVER: 0
COUGH: 0
DIARRHEA: 0
FLANK PAIN: 0
WOUND: 0

## 2024-10-21 ASSESSMENT — PAIN - FUNCTIONAL ASSESSMENT
PAIN_FUNCTIONAL_ASSESSMENT: 0-10

## 2024-10-21 ASSESSMENT — PAIN DESCRIPTION - LOCATION: LOCATION: HEAD

## 2024-10-21 ASSESSMENT — ACTIVITIES OF DAILY LIVING (ADL): LACK_OF_TRANSPORTATION: NO

## 2024-10-21 NOTE — PROGRESS NOTES
10/21/24 1538   Discharge Planning   Living Arrangements Spouse/significant other;Children   Support Systems Spouse/significant other   Assistance Needed Patient is A&OX3, independent in ADLs, ambulates without assistive devices, drives, and no O2, CPAP or Bipap at baseline.   Type of Residence Private residence   Number of Stairs to Enter Residence 2   Number of Stairs Within Residence 24  (1 flight upstairs, 1 flight to basement)   Do you have animals or pets at home? No   Who is requesting discharge planning? Provider   Home or Post Acute Services None   Expected Discharge Disposition Home  (Home, no needs-denied need for HHC)   Does the patient need discharge transport arranged? No   Financial Resource Strain   How hard is it for you to pay for the very basics like food, housing, medical care, and heating? Not hard   Housing Stability   In the last 12 months, was there a time when you were not able to pay the mortgage or rent on time? N   In the past 12 months, how many times have you moved where you were living? 0   At any time in the past 12 months, were you homeless or living in a shelter (including now)? N   Transportation Needs   In the past 12 months, has lack of transportation kept you from medical appointments or from getting medications? no   In the past 12 months, has lack of transportation kept you from meetings, work, or from getting things needed for daily living? No

## 2024-10-21 NOTE — CARE PLAN
The patient's goals for the shift include      The clinical goals for the shift include Patient will remain without cardiac symptoms throughout shift

## 2024-10-21 NOTE — DISCHARGE SUMMARY
Discharge Diagnosis  NSTEMI (non-ST elevated myocardial infarction)  Acute diastolic CHF  Hypertensive urgency    Issues Requiring Follow-Up  Followup with PCP and cardiology after discharge from hospital     Discharge Meds     Medication List      START taking these medications     cholecalciferol 50 MCG (2000 UT) tablet; Commonly known as: Vitamin D-3;   Take 1 tablet (2,000 Units) by mouth once daily.; Start taking on: October 22, 2024; Replaces: Vitamin D3 50 mcg (2,000 unit) capsule   furosemide 20 mg tablet; Commonly known as: Lasix; Take 1 tablet (20 mg)   by mouth once daily. Do not fill before October 22, 2024.; Start taking   on: October 22, 2024   metoprolol succinate XL 25 mg 24 hr tablet; Commonly known as:   Toprol-XL; Take 1 tablet (25 mg) by mouth once daily. Do not crush or   chew. Do not fill before October 22, 2024.; Start taking on: October 22, 2024   pantoprazole 20 mg EC tablet; Commonly known as: ProtoNix; Take 1 tablet   (20 mg) by mouth once daily in the morning. Take before meals. Do not   crush, chew, or split.; Start taking on: October 22, 2024; Replaces:   omeprazole 20 mg DR capsule     CHANGE how you take these medications     atorvastatin 80 mg tablet; Commonly known as: Lipitor; Take 1 tablet (80   mg) by mouth once daily at bedtime.; What changed: medication strength,   how much to take, when to take this   ezetimibe 10 mg tablet; Commonly known as: Zetia; Take 1 tablet (10 mg)   by mouth once daily at bedtime.; What changed: See the new instructions.   isosorbide mononitrate ER 60 mg 24 hr tablet; Commonly known as: Imdur;   Take 1 tablet (60 mg) by mouth once daily at bedtime. Do not crush or   chew.; What changed: medication strength, how much to take, additional   instructions     CONTINUE taking these medications     aspirin 81 mg EC tablet   clopidogrel 75 mg tablet; Commonly known as: Plavix; TAKE ONE TABLET BY   MOUTH DAILY   glipiZIDE 5 mg tablet; Commonly known as:  Glucotrol; Take 1 tablet (5   mg) by mouth 2 times a day before meals.   Jardiance 25 mg; Generic drug: empagliflozin; Take 1 tablet (25 mg) by   mouth once daily.   lisinopril 20 mg tablet; TAKE ONE TABLET BY MOUTH EVERY DAY   metFORMIN 500 mg tablet; Commonly known as: Glucophage; take 2 tablets   by mouth (1000mg) by mouth 2 times a day with meals.     STOP taking these medications     omeprazole 20 mg DR capsule; Commonly known as: PriLOSEC; Replaced by:   pantoprazole 20 mg EC tablet   Vitamin D3 50 mcg (2,000 unit) capsule; Generic drug: cholecalciferol;   Replaced by: cholecalciferol 50 MCG (2000 UT) tablet       Test Results Pending At Discharge  Pending Labs       No current pending labs.            Hospital Course  Arnaldo Balderrama is a 54 y.o. male with a PMH of CAD on Plavix, s/p CABG, HLD, HTN, T2DM, GERD, who presented with chest pain and shortness of breath. Admitted for NSTEMI. Cardiology consulted and planned LHC on 10/21/24.   Patient was pre-treated with Brilinta 180mg was given followed by 90 mg BID, Aspirin 81 mg daily, Atorvastatin 80 mg. Additionally, started on Metoprolol tartrate 25 mg BID per cardio recs. ECHO ordered and reviewed by cardiology.     Cardiology recommendations post-LHC on 10/21:  - to increase Imdur to 60 mg daily  - to continue Furosemide 20 mg daily  - to continue Atorvastatin 80 mg nightly, Zetioa 10 mg nightly, Lisinopril 20 mg daily, Metoprolol succinate 25 mg daily, Plavix 75 mg daily  - follow up scheduled 11/6/24    Patient was hemodynamically stable for discharge on 10/21/24.    Pertinent Physical Exam At Time of Discharge  Physical Exam  Constitutional:       General: He is not in acute distress.     Appearance: Normal appearance. He is obese.   HENT:      Head: Normocephalic and atraumatic.      Mouth/Throat:      Mouth: Mucous membranes are moist.   Eyes:      Conjunctiva/sclera: Conjunctivae normal.      Pupils: Pupils are equal, round, and reactive to light.    Cardiovascular:      Rate and Rhythm: Normal rate and regular rhythm.      Heart sounds: Normal heart sounds.   Pulmonary:      Effort: No respiratory distress.      Breath sounds: Normal breath sounds. No wheezing or rhonchi.   Abdominal:      General: Bowel sounds are normal.      Palpations: Abdomen is soft.      Tenderness: There is no abdominal tenderness.   Musculoskeletal:         General: No swelling.      Cervical back: Neck supple.      Right lower leg: Edema present.      Left lower leg: Edema present.   Skin:     General: Skin is warm and dry.   Neurological:      General: No focal deficit present.      Mental Status: He is alert.         Outpatient Follow-Up  Future Appointments   Date Time Provider Department Center   11/5/2024  3:40 PM Jossie Haney, APRN-CNP [id number] [facility]   11/6/2024  9:00 AM Shine Schmidt MD [id number] [facility]   12/18/2024  4:40 PM Aminata Manuel APRN-CNP [id number] [facility]   1/15/2025  4:00 PM Shashank Patel MD [id number] [facility]         Ranjit Hackett DO

## 2024-10-21 NOTE — PROGRESS NOTES
"Subjective   Subjective:   History Of Present Illness:  Arnaldo Balderrama is a 54 y.o. male was seen and evaluated at bedside today. Overnight, no reported acute events. Patient is at no acute distress. Denies experiencing any symptoms this morning.    Review Of Systems:  11-point ROS was performed and is negative except as noted below and in the HPI.     Review of Systems   Constitutional:  Negative for chills and fever.   Eyes:  Negative for discharge.   Respiratory:  Negative for cough, shortness of breath and wheezing.    Cardiovascular:  Negative for chest pain and leg swelling.   Gastrointestinal:  Negative for abdominal pain, diarrhea, nausea and vomiting.   Genitourinary:  Negative for difficulty urinating, dysuria and flank pain.   Musculoskeletal:  Negative for myalgias.   Skin:  Negative for wound.   Neurological:  Negative for dizziness and numbness.   Psychiatric/Behavioral:  Negative for confusion.         Objective   Objective:     BP (!) 161/96   Pulse 69   Temp 36.2 °C (97.2 °F) (Temporal)   Resp 12   Ht 1.702 m (5' 7\")   Wt 99.9 kg (220 lb 3.2 oz)   SpO2 97%   BMI 34.49 kg/m²     Physical Exam  Constitutional:       General: He is not in acute distress.     Appearance: He is obese.   HENT:      Head: Normocephalic and atraumatic.      Mouth/Throat:      Mouth: Mucous membranes are moist.   Eyes:      Conjunctiva/sclera: Conjunctivae normal.      Pupils: Pupils are equal, round, and reactive to light.   Cardiovascular:      Rate and Rhythm: Normal rate and regular rhythm.      Heart sounds: Normal heart sounds.   Pulmonary:      Effort: No respiratory distress.      Breath sounds: Normal breath sounds. No wheezing or rhonchi.   Abdominal:      General: Bowel sounds are normal.      Palpations: Abdomen is soft.      Tenderness: There is no abdominal tenderness.   Musculoskeletal:         General: Swelling present.      Cervical back: Neck supple.      Right lower leg: Edema present.      Left " lower leg: Edema present.   Skin:     General: Skin is warm and dry.   Neurological:      General: No focal deficit present.      Mental Status: He is alert.       Lab Work:     Lab Results   Component Value Date    WBC 8.9 10/21/2024    HGB 15.5 10/21/2024    HCT 46.0 10/21/2024    MCV 85 10/21/2024     10/21/2024     Lab Results   Component Value Date    GLUCOSE 183 (H) 10/21/2024    CALCIUM 9.1 10/21/2024     10/21/2024    K 4.0 10/21/2024    CO2 29 10/21/2024    CL 99 10/21/2024    BUN 12 10/21/2024    CREATININE 0.97 10/21/2024     POC HEMOGLOBIN A1c   Date Value Ref Range Status   10/01/2024 8.3 (A) 4.2 - 6.5 % Final   06/21/2024 7.5 (A) 4.2 - 6.5 % Final   12/21/2023 7.2 (A) 4.2 - 6.5 % Final     Thyroid Stimulating Hormone   Date Value Ref Range Status   12/29/2023 2.97 0.27 - 4.20 mIU/L Final     TSH   Date Value Ref Range Status   06/18/2022 2.64 0.44 - 3.98 mIU/L Final     Comment:      TSH testing is performed using different testing    methodology at [facility] than at other    [facility]. Direct result comparisons should    only be made within the same method.     12/05/2020 2.37 0.44 - 3.98 mIU/L Final     Comment:      TSH testing is performed using different testing    methodology at [facility] than at other    [facility]. Direct result comparisons should    only be made within the same method.       Vitamin D, 25-Hydroxy, Total   Date Value Ref Range Status   12/29/2023 75 31 - 100 ng/mL Final     Vitamin D, 25-Hydroxy   Date Value Ref Range Status   12/05/2020 29 (A) ng/mL Final     Comment:     .  DEFICIENCY:         < 20   NG/ML  INSUFFICIENCY:      20-29  NG/ML  SUFFICIENCY:         NG/ML    THIS ASSAY ACCURATELY QUANTIFIES THE SUM OF  VITAMIN D3, 25-HYDROXY AND VIT D2,25-HYDROXY.       Cultures:   No results found for the last 90 days.    Images:     CT angio chest for pulmonary embolism   Final Result   1. No CT signs of pulmonary  embolism.   2. Diffuse ground-glass infiltrates suspicious for pulmonary edema.   Correlate clinically. Suspected signs of CHF. Small bilateral pleural   effusions             MACRO:   None        Signed by: Indira Dewey 10/20/2024 11:17 AM   Dictation workstation:   [id number]         Medications:     Scheduled:  aspirin, 81 mg, oral, Daily  atorvastatin, 40 mg, oral, Nightly  cholecalciferol, 2,000 Units, oral, Daily  ezetimibe, 10 mg, oral, Nightly  furosemide, 40 mg, intravenous, q12h  insulin lispro, 0-10 Units, subcutaneous, Before meals & nightly  isosorbide mononitrate ER, 30 mg, oral, Nightly  lisinopril, 20 mg, oral, Daily  magnesium oxide, 800 mg, oral, Once  metoprolol tartrate, 25 mg, oral, BID  pantoprazole, 20 mg, oral, Daily before breakfast  perflutren protein A microsphere, 0.5 mL, intravenous, Once in imaging  sulfur hexafluoride microsphr, 2 mL, intravenous, Once in imaging  ticagrelor, 90 mg, oral, BID    Continuous:  heparin, 0-4,000 Units/hr, Last Rate: 1,400 Units/hr (10/21/24 0426)  oxygen, , Last Rate: 2 L/min (10/21/24 1249)      PRN:  PRN medications: fentaNYL PF, lidocaine, midazolam, oxygen, oxygen     Assessment & Plan:     Arnaldo Balderrama is a 54 y.o. male with a PMH of CAD on Plavix, s/p CABG, HLD, HTN, T2DM, GERD, who presented with chest pain and shortness of breath. Admitted for NSTEMI. Cardiology consulted and planned [facility] on 10/21/24.     Acute Medical Issues  #NSTEMI:  #Acute on chronic diastolic HF:  - NOLVIA score (5), HEART score (7)  - Lipid panel normal, A1c 8.3%  PLAN:  - Cardiology following, [facility] 10/21  - Continue Brilinta 90 mg BID, Aspirin 81 mg daily, Atorvastatin 80 mg, Ezetimibe 10 mg and imdur 30mg   - Continue Lasix 40 mg IV BID per cardio recs  - Started Metoprolol tartrate 25 mg BID   - ECHO pending  - telemetry, supplemental oxygen as needed to ensure O2 saturation >90%, strict I&Os, daily weights, cardio diet post-[facility]     Chronic Medical Issues  #DMII - SSI  while inpatient  #HTN - continue Lisinopril  #CAD - continue Brilinta 90 BID with Aspirin 81 mg daily  #GERD - protonix as above    Disposition: admitted for NSTEMI. Cardiology following and planned LHC on 10/21. Will be discharge home today or tomorrow based on cardiology recs. Estimated length of stay <2 days.    Ranjit Hackett, PGY-3  Internal Medicine     Disclaimer: Documentation completed with the information available at the time of input. The times in the chart may not be reflective of actual patient care times, interventions, or procedures. Documentation occurs after the physical care of the patient.

## 2024-10-21 NOTE — PROGRESS NOTES
Subjective Data:  Denies CP. SOB improved with diuresis. Now on room air.        Objective Data:  Last Recorded Vitals:  Vitals:    10/21/24 0444 10/21/24 0536 10/21/24 0833 10/21/24 0954   BP:  129/77 133/72 127/78   BP Location:    Right arm   Patient Position:    Sitting   Pulse:  72 73 73   Resp:  21  (!) 29   Temp:  35.8 °C (96.5 °F)  36.2 °C (97.2 °F)   TempSrc:  Temporal  Temporal   SpO2:  97%  93%   Weight: 99.9 kg (220 lb 3.2 oz)      Height:           Last Labs:  CBC - 10/21/2024:  6:13 AM  8.9 15.5 262    46.0      CMP - 10/21/2024:  6:13 AM  9.1 6.9 22 --- 0.7   4.4 3.9 30 60      PTT - 10/20/2024: 11:54 AM  1.1   12.0 35     TROPHS   Date/Time Value Ref Range Status   10/20/2024 03:20  0 - 20 ng/L Final     Comment:     Previous result verified on 10/20/2024 1029 on specimen/case [id number] called with component TRPHS for procedure Troponin I, High Sensitivity, Initial with value 514 ng/L.   10/20/2024 10:59  0 - 20 ng/L Final     Comment:     Previous result verified on 10/20/2024 1029 on specimen/case [id number] called with component TRPHS for procedure Troponin I, High Sensitivity, Initial with value 514 ng/L.   10/20/2024 09:58  0 - 20 ng/L Final     BNP   Date/Time Value Ref Range Status   10/20/2024 09:58  0 - 99 pg/mL Final   12/11/2017 03:39  0 - 99 pg/mL Final     Comment:     .  <100 pg/mL - Heart failure unlikely  100-299 pg/mL - Intermediate probability of acute heart  .               failure exacerbation. Correlate with clinical  .               context and patient history.    >=300 pg/mL - Heart Failure likely. Correlate with clinical  .               context and patient history.  BNP testing is performed using different testing   methodology at [facility] than at other   [facility] hospitals. Direct result comparisons should   only be made within the same method.       HGBA1C   Date/Time Value Ref Range Status   10/01/2024 03:37 PM 8.3 4.2  "- 6.5 % Final   06/21/2024 03:59 PM 7.5 4.2 - 6.5 % Final     LDLCALC   Date/Time Value Ref Range Status   10/20/2024 10:59 AM 68 <=99 mg/dL Final     Comment:                                 Near   Borderline      AGE      Desirable  Optimal    High     High     Very High     0-19 Y     0 - 109     ---    110-129   >/= 130     ----    20-24 Y     0 - 119     ---    120-159   >/= 160     ----      >24 Y     0 -  99   100-129  130-159   160-189     >/=190     12/29/2023 03:47 PM 64 65 - 130 mg/dL Final   06/12/2023 03:04 PM 59 65 - 130 MG/DL Final   01/06/2022 04:33 PM 88 65 - 130 MG/DL Final   10/07/2020 04:02 PM 69 65 - 130 MG/DL Final     VLDL   Date/Time Value Ref Range Status   10/20/2024 10:59 AM 18 0 - 40 mg/dL Final   09/24/2022 09:05 AM 24 0 - 40 mg/dL Final   06/18/2022 09:09 AM 29 0 - 40 mg/dL Final   12/05/2020 09:38 AM 15 0 - 40 mg/dL Final      Last I/O:  I/O last 3 completed shifts:  In: 451.2 (4.5 mL/kg) [P.O.:177; I.V.:274.2 (2.7 mL/kg)]  Out: 5050 (50.6 mL/kg) [Urine:5050 (1.4 mL/kg/hr)]  Weight: 99.9 kg     Past Cardiology Tests (Last 3 Years):  EKG:  ECG 12 lead 05/30/2024      ECG 12 lead (Clinic Performed) 11/07/2023    Echo:  4/7/2022  CONCLUSIONS:   1. The left ventricular systolic function is normal with a 55-60% estimated ejection fraction.   2. There is trace mitral and tricuspid regurgitation.    Ejection Fractions:  No results found for: \"EF\"  Cath:  No results found for this or any previous visit from the past 1095 days.    Stress Test:  No results found for this or any previous visit from the past 1095 days.    Cardiac Imaging:  No results found for this or any previous visit from the past 1095 days.      Inpatient Medications:  Scheduled medications   Medication Dose Route Frequency    aspirin  81 mg oral Daily    atorvastatin  40 mg oral Nightly    cholecalciferol  2,000 Units oral Daily    ezetimibe  10 mg oral Nightly    furosemide  40 mg intravenous q12h    insulin lispro  0-10 " Units subcutaneous Before meals & nightly    isosorbide mononitrate ER  30 mg oral Nightly    lisinopril  20 mg oral Daily    metoprolol tartrate  25 mg oral BID    pantoprazole  20 mg oral Daily before breakfast    perflutren protein A microsphere  0.5 mL intravenous Once in imaging    sulfur hexafluoride microsphr  2 mL intravenous Once in imaging    ticagrelor  90 mg oral BID     PRN medications   Medication    oxygen     Continuous Medications   Medication Dose Last Rate    heparin  0-4,000 Units/hr 1,400 Units/hr (10/21/24 0426)     Physical Exam  Vitals reviewed.   Constitutional:       Appearance: Normal appearance. He is normal weight.   HENT:      Head: Normocephalic and atraumatic.   Neck:      Vascular: No carotid bruit or JVD.   Cardiovascular:      Rate and Rhythm: Normal rate and regular rhythm.      Pulses: Normal pulses.      Heart sounds: Normal heart sounds.   Pulmonary:      Effort: Pulmonary effort is normal.      Breath sounds: Examination of the right-lower field reveals rales. Examination of the left-lower field reveals rales. Rales present.   Chest:      Chest wall: No tenderness.   Abdominal:      General: Abdomen is flat. Bowel sounds are normal.      Palpations: Abdomen is soft.   Musculoskeletal:      Right lower le+ Pitting Edema present.      Left lower le+ Pitting Edema present.   Skin:     General: Skin is warm and dry.   Neurological:      General: No focal deficit present.      Mental Status: He is alert and oriented to person, place, and time. Mental status is at baseline.   Psychiatric:         Mood and Affect: Mood normal.         Behavior: Behavior normal.         Assessment/Plan   Assessment  54 y.o. male from home with h/o CAD s/p CABG x4 2017 (LIMA to LAD, SVG to PDA, SVG to OM, SVG to Diag attached proximally to the zaman of OM), [facility]  with 4/4 patent grafts, htn, type 2 DM, hld admitted with NSTEMI. I&O -4.5L. Currently on room air.      NSTEMI  Acute  decompensated HF, echo pending, NYHA III  Hypertensive urgency      Plan  -[facility] without intervention, full report to follow  -Echocardiogram for assessment of mechanical and structural function  -Increase Imdur to 60 mg daily, change Lasix to po  -Continue atorvastatin 80 mg nightly, Zetioa 10 mg nightly, lisinopril 20 mg daily, Toprol 25 mg daily, Plavix 75 mg daily  -OP follow up in 2-3 weeks    Peripheral IV 10/20/24 20 G Left Antecubital (Active)   Site Assessment Clean;Dry;Intact 10/21/24 0837   Dressing Status Dry;Clean 10/21/24 0837   Number of days: 1       Code Status:  Full Code        Esau Churchill, [place]-CNP    Patient seen, discussed, and examined with GUNNAR Churchill, above is representative of my medical decision making.    Shine Schmidt MD

## 2024-10-21 NOTE — DISCHARGE INSTRUCTIONS
1) Please, take your home medications as instructed after being discharged from the hospital.    NEW MEDICATIONS:  Cardiology recommendations post-[facility] on 10/21:  - to increase Imdur to 60 mg daily  - to continue Furosemide 20 mg daily  - to continue Atorvastatin 80 mg nightly, Zetioa 10 mg nightly, Lisinopril 20 mg daily, Metoprolol succinate 25 mg daily, Plavix 75 mg daily  - follow up scheduled 11/6/24    2) Please, follow-up with your primary care provider within 7 to 14 days after leaving the hospital. /appointment services has been requested to make an appointment for you, however if you do not hear back from them within 1 to 2 days, please call your primary physician's office to schedule an appointment. Bring your photo ID and insurance card to your appointment.   [facility]  services can be reached at [phone].    - Please, call   or appointment services and schedule a follow-up with your PCP within 1-2 weeks after you leave the hospital.    3) If you experience any worsening symptoms or have any concerns, please contact your primary care provider to schedule an appointment. If you cannot get in touch with your primary care physician, please return to the nearest emergency room or urgent care clinic for an evaluation and treatment.    Thank you for choosing [facility] and allowing us to partake in your medical care!    - Your [facility] inpatient primary care team.

## 2024-10-22 ENCOUNTER — PATIENT OUTREACH (OUTPATIENT)
Dept: PRIMARY CARE | Facility: CLINIC | Age: 54
End: 2024-10-22
Payer: COMMERCIAL

## 2024-10-22 NOTE — PROGRESS NOTES
Discharge Facility: [facility]  Discharge Diagnosis: NSTEMI (non-ST elevated myocardial infarction), Acute diastolic CHF, Hypertensive urgency  Admission Date: 10/20/2024  Procedure Date: 10/21/2024 Left heart catheterization  Discharge Date: 10/21/2024    PCP Appointment Date: 12/18/2024 4:40 PM - declined hospital follow up  Specialist Appointment Date: 11/5/2024 3:40 PM, Jossie Haney CNP Cardiology,   11/6/2024 09:00 AM Dr. Shine Schmidt, Cardiology  Hospital Encounter and Summary Linked: Yes  See discharge assessment below for further details    Medications  Medications reviewed with patient/caregiver?: Yes (Patient) (10/22/2024  2:02 PM)  Is the patient having any side effects they believe may be caused by any medication additions or changes?: No (10/22/2024  2:02 PM)  Does the patient have all medications ordered at discharge?: No (Pharmacy had to order 3 prescriptions. Should be available today for .) (10/22/2024  2:02 PM)  Care Management Interventions: No intervention needed (10/22/2024  2:02 PM)  Prescription Comments: NEW: Vitamin D-3, furosemide, metoprolol XL, pantoprazole  CHANGED: atorvastatin, ezemtimbe, and isosorbide  STOP: omeprazole (10/22/2024  2:02 PM)  Care Management Interventions: Provided patient education (10/22/2024  2:02 PM)  Medication Comments: Verbalized understanding of medication changes. Advised patient he could double up on his dosages of atorvastatin and isosorbide unitl his new prescriptions were available from the pharmacy. (10/22/2024  2:02 PM)    Appointments  Does the patient have a primary care provider?: Yes (10/22/2024  2:02 PM)  Care Management Interventions: Verified appointment date/time/provider (Declined hospital follow up. Has a previoulsly scheduled appt in) (10/22/2024  2:02 PM)  Has the patient kept scheduled appointments due by today?: Not applicable (10/22/2024  2:02 PM)    Self Management  What is the home health agency?: N/A  (10/22/2024  2:02 PM)  What Durable Medical Equipment (DME) was ordered?: N/A (10/22/2024  2:02 PM)    Patient Teaching  Does the patient have access to their discharge instructions?: Yes (Verbalized understanding of activity restrictions and wound care.) (10/22/2024  2:02 PM)  Care Management Interventions: Reviewed instructions with patient (10/22/2024  2:02 PM)  What is the patient's perception of their health status since discharge?: Improving (Stated feels well. Denied SOB or CP. Feet and ankles remain slightly swollen. Furosemide Rx not available from pharmacy until later today. No bleeding or oozing from groin cath insertion site.) (10/22/2024  2:02 PM)  Is the patient/caregiver able to teach back the hierarchy of who to call/visit for symptoms/problems? PCP, Specialist, Home Health nurse, Urgent Care, ED, 911: Yes (10/22/2024  2:02 PM)  Patient/Caregiver Education Comments: Patient verbalized understanding of discharge instructions. Provided contact information for nonurgent questions or concerns. (10/22/2024  2:02 PM)    Wrap Up  Wrap Up Additional Comments: 54yoM with PMHx of CAD on Plavix, s/p CABG, HLD, HTN, T2DM, GERD, who presented with chest pain and shortness of breath. Admitted for NSTEMI. Cardiology consulted and patient s/p LHC on 10/21/24. Cardiac meds adjusted and patient discharged to home self care. Cardiology follow up scheduled. Declined primary care hospital follow. Will keep previously scheduled 12/18/2024 follow up with primary care. (10/22/2024  2:02 PM)

## 2024-10-27 ENCOUNTER — TELEPHONE (OUTPATIENT)
Dept: INTERNAL MEDICINE | Facility: HOSPITAL | Age: 54
End: 2024-10-27
Payer: COMMERCIAL

## 2024-10-27 LAB
AORTIC VALVE MEAN GRADIENT: 3.3 MMHG
AORTIC VALVE PEAK VELOCITY: 1.17 M/S
AV PEAK GRADIENT: 5.5 MMHG
AVA (PEAK VEL): 1.54 CM2
AVA (VTI): 1.4 CM2
EJECTION FRACTION APICAL 4 CHAMBER: 42.9
EJECTION FRACTION: 38 %
LEFT ATRIUM VOLUME AREA LENGTH INDEX BSA: 24.2 ML/M2
LEFT VENTRICLE INTERNAL DIMENSION DIASTOLE: 4.49 CM (ref 3.5–6)
LEFT VENTRICULAR OUTFLOW TRACT DIAMETER: 2.01 CM
MITRAL VALVE E/A RATIO: 1.07
RIGHT VENTRICLE FREE WALL PEAK S': 9 CM/S
TRICUSPID ANNULAR PLANE SYSTOLIC EXCURSION: 1.2 CM

## 2024-10-27 NOTE — TELEPHONE ENCOUNTER
Patient discharged from hospital last week.    Patient called to checkup on how he is doing since discharge from hospital.   No response to phone call going to voicemail. Left voicemail message with callback number.  Will update note if patient returns phone call.     Mario Alfonso DO  Hospitalist, [facility]

## 2024-10-29 DIAGNOSIS — I50.42 CHRONIC COMBINED SYSTOLIC AND DIASTOLIC CONGESTIVE HEART FAILURE: Primary | ICD-10-CM

## 2024-10-29 DIAGNOSIS — E11.42 TYPE 2 DIABETES MELLITUS WITH DIABETIC POLYNEUROPATHY, WITHOUT LONG-TERM CURRENT USE OF INSULIN: ICD-10-CM

## 2024-10-29 LAB
ATRIAL RATE: 90 BPM
P AXIS: 6 DEGREES
P OFFSET: 208 MS
P ONSET: 155 MS
PR INTERVAL: 142 MS
Q ONSET: 226 MS
QRS COUNT: 15 BEATS
QRS DURATION: 84 MS
QT INTERVAL: 348 MS
QTC CALCULATION(BAZETT): 425 MS
QTC FREDERICIA: 398 MS
R AXIS: 10 DEGREES
T AXIS: 165 DEGREES
T OFFSET: 400 MS
VENTRICULAR RATE: 90 BPM

## 2024-11-05 ENCOUNTER — OFFICE VISIT (OUTPATIENT)
Dept: CARDIOLOGY | Facility: CLINIC | Age: 54
End: 2024-11-05
Payer: COMMERCIAL

## 2024-11-05 VITALS
SYSTOLIC BLOOD PRESSURE: 168 MMHG | WEIGHT: 230.9 LBS | DIASTOLIC BLOOD PRESSURE: 81 MMHG | HEART RATE: 82 BPM | BODY MASS INDEX: 36.16 KG/M2 | OXYGEN SATURATION: 97 %

## 2024-11-05 DIAGNOSIS — I25.10 CORONARY ARTERY DISEASE INVOLVING NATIVE HEART WITHOUT ANGINA PECTORIS, UNSPECIFIED VESSEL OR LESION TYPE: ICD-10-CM

## 2024-11-05 DIAGNOSIS — E78.5 HYPERLIPIDEMIA, UNSPECIFIED HYPERLIPIDEMIA TYPE: ICD-10-CM

## 2024-11-05 DIAGNOSIS — I21.4 NSTEMI (NON-ST ELEVATED MYOCARDIAL INFARCTION) (MULTI): ICD-10-CM

## 2024-11-05 DIAGNOSIS — I10 ESSENTIAL HYPERTENSION, BENIGN: Primary | ICD-10-CM

## 2024-11-05 PROCEDURE — 1036F TOBACCO NON-USER: CPT | Performed by: NURSE PRACTITIONER

## 2024-11-05 PROCEDURE — 99214 OFFICE O/P EST MOD 30 MIN: CPT | Performed by: NURSE PRACTITIONER

## 2024-11-05 PROCEDURE — 3079F DIAST BP 80-89 MM HG: CPT | Performed by: NURSE PRACTITIONER

## 2024-11-05 PROCEDURE — 4010F ACE/ARB THERAPY RXD/TAKEN: CPT | Performed by: NURSE PRACTITIONER

## 2024-11-05 PROCEDURE — 3048F LDL-C <100 MG/DL: CPT | Performed by: NURSE PRACTITIONER

## 2024-11-05 PROCEDURE — 3077F SYST BP >= 140 MM HG: CPT | Performed by: NURSE PRACTITIONER

## 2024-11-05 RX ORDER — FUROSEMIDE 20 MG/1
40 TABLET ORAL DAILY
Qty: 180 TABLET | Refills: 3 | Status: SHIPPED | OUTPATIENT
Start: 2024-11-05 | End: 2025-11-05

## 2024-11-05 ASSESSMENT — ENCOUNTER SYMPTOMS
MYALGIAS: 1
CONSTITUTIONAL NEGATIVE: 1
RESPIRATORY NEGATIVE: 1
NEUROLOGICAL NEGATIVE: 1
HEMATOLOGIC/LYMPHATIC NEGATIVE: 1
PSYCHIATRIC NEGATIVE: 1
CARDIOVASCULAR NEGATIVE: 1
EYES NEGATIVE: 1
ENDOCRINE NEGATIVE: 1
GASTROINTESTINAL NEGATIVE: 1

## 2024-11-05 NOTE — PROGRESS NOTES
Referred by Dr. Aragon ref. provider found for No chief complaint on file.     History Of Present Illness:    Arnaldo Balderrama is a very pleasant 5  ]3 year old gentleman with a history of CAD s/p CABG x4, DM, HTN and HLD, he is here for a follow up visit. The patient is seen in collaboration with Dr. Schmidt. Mr. Balderrama recently admitted with an NSTEMI. Left heart cath showed patent grafts. Has shortness of breath on exertion. Continues to have chest pain. Increased the Imdur to 60 mg daily. Has not notice a difference on the Lasix.       Review of Systems   Constitutional: Negative.   HENT: Negative.     Eyes: Negative.    Cardiovascular: Negative.    Respiratory: Negative.     Endocrine: Negative.    Hematologic/Lymphatic: Negative.    Skin: Negative.    Musculoskeletal:  Positive for muscle weakness and myalgias.   Gastrointestinal: Negative.    Neurological: Negative.    Psychiatric/Behavioral: Negative.        Past Medical History:  He has a past medical history of Atherosclerosis of native coronary artery of native heart without angina pectoris, Chronic gout without tophus, unspecified cause, unspecified site, Hyperlipidemia, Hypothyroidism, unspecified (12/02/2020), Other fatigue (12/02/2020), Personal history of other diseases of the musculoskeletal system and connective tissue (09/24/2019), Personal history of other specified conditions (10/09/2018), and Type 2 diabetes mellitus with diabetic polyneuropathy, without long-term current use of insulin.    Past Surgical History:  He has a past surgical history that includes Coronary artery bypass graft (01/16/2018); Other surgical history (12/08/2022); Cardiac surgery (2017); Cardiac catheterization (03/2020); Gallbladder surgery (06/2023); and Cardiac catheterization (N/A, 10/21/2024).      Social History:  He reports that he has never smoked. He has never been exposed to tobacco smoke. He has never used smokeless tobacco. He reports current alcohol use. He  reports that he does not use drugs.    Family History:  Family History   Problem Relation Name Age of Onset    Thyroid disease Mother      Diabetes Father      Heart disease Father      Thyroid disease Father      Hypertension Sibling          Allergies:  Patient has no known allergies.    Outpatient Medications:  Current Outpatient Medications   Medication Instructions    aspirin 81 mg EC tablet 1 tablet, Daily    atorvastatin (LIPITOR) 80 mg, oral, Nightly    cholecalciferol (VITAMIN D-3) 2,000 Units, oral, Daily    clopidogrel (PLAVIX) 75 mg, oral, Daily    ezetimibe (ZETIA) 10 mg, oral, Nightly    furosemide (LASIX) 20 mg, oral, Daily    glipiZIDE (GLUCOTROL) 5 mg, oral, 2 times daily before meals    isosorbide mononitrate ER (IMDUR) 60 mg, oral, Nightly, Do not crush or chew.    Jardiance 25 mg, oral, Daily    lisinopril 20 mg, oral, Daily    metFORMIN (Glucophage) 500 mg tablet take 2 tablets by mouth (1000mg) by mouth 2 times a day with meals.    metoprolol succinate XL (TOPROL-XL) 25 mg, oral, Daily, Do not crush or chew.    pantoprazole (PROTONIX) 20 mg, oral, Daily before breakfast, Do not crush, chew, or split.        Last Recorded Vitals:  Vitals:    11/05/24 1538   BP: 168/81   Pulse: 82   SpO2: 97%   Weight: 105 kg (230 lb 14.4 oz)         Physical Exam:  Physical Exam  Vitals reviewed.   HENT:      Head: Normocephalic.      Nose: Nose normal.   Eyes:      Pupils: Pupils are equal, round, and reactive to light.   Cardiovascular:      Rate and Rhythm: Normal rate and regular rhythm.   Pulmonary:      Effort: Pulmonary effort is normal.      Breath sounds: Normal breath sounds.   Abdominal:      General: Abdomen is flat.      Palpations: Abdomen is soft.   Musculoskeletal:         General: Normal range of motion.      Cervical back: Normal range of motion.   Skin:     General: Skin is warm and dry.   Neurological:      General: No focal deficit present.      Mental Status: He is alert and oriented to  person, place, and time.   Psychiatric:         Mood and Affect: Mood normal.            Last Labs:  CBC -  Lab Results   Component Value Date    WBC 8.9 10/21/2024    HGB 15.5 10/21/2024    HCT 46.0 10/21/2024    MCV 85 10/21/2024     10/21/2024       CMP -  Lab Results   Component Value Date    CALCIUM 9.1 10/21/2024    PHOS 4.4 10/21/2024    PROT 6.9 10/20/2024    ALBUMIN 3.9 10/21/2024    AST 22 10/20/2024    ALT 30 10/20/2024    ALKPHOS 60 10/20/2024    BILITOT 0.7 10/20/2024       LIPID PANEL -   Lab Results   Component Value Date    CHOL 117 10/20/2024    TRIG 88 10/20/2024    HDL 31.3 10/20/2024    CHHDL 3.7 10/20/2024    LDLF 94 09/24/2022    VLDL 18 10/20/2024    NHDL 86 10/20/2024       RENAL FUNCTION PANEL -   Lab Results   Component Value Date    GLUCOSE 183 (H) 10/21/2024     10/21/2024    K 4.0 10/21/2024    CL 99 10/21/2024    CO2 29 10/21/2024    ANIONGAP 13 10/21/2024    BUN 12 10/21/2024    CREATININE 0.97 10/21/2024    GFRMALE >90 06/18/2022    CALCIUM 9.1 10/21/2024    PHOS 4.4 10/21/2024    ALBUMIN 3.9 10/21/2024        Lab Results   Component Value Date     (H) 10/20/2024    HGBA1C 8.3 (A) 10/01/2024       Last Cardiology Tests:  ECG:      Echo:  Echocardiogram 10/21/2024   1. The left ventricular systolic function is moderately decreased, with a visually estimated ejection fraction of 35-40%.   2. There is global hypokinesis of the left ventricle with minor regional variations.   3. Spectral Doppler shows an abnormal pattern of left ventricular diastolic filling.    Echocardiogram 4/7/2022  1. The left ventricular systolic function is normal with a 55-60% estimated ejection fraction.  2. There is trace mitral and tricuspid regurgitation.    Echo 1/19/18:   1. The left ventricular systolic function is low normal with a 50-55% estimated  ejection fraction.   2. Abnormal septal motion consistent with post-operative status.   3. Spectral Doppler shows an impaired relaxation  pattern of left ventricular  diastolic filling.   4. There is mild mitral and tricuspid regurgitation.    Echo 12/14/17:   1. The left ventricular systolic function is severely decreased with a 25-30%  estimated ejection fraction.   2. Poorly visualized anatomical structures due to suboptimal image quality.   3. Spectral Doppler shows a restrictive pattern of left ventricular diastolic  filling.   4. RVSP within normal limits.  Ejection Fractions:  LVEF 35-40%    Cath:  Heart cath 10/21/2024  1. Left main: 100% distal stenosis.   2. RCA: 80% diffuse prox-mid disease.   3. 4/4 patent grafts: (1) LIMA - LAD (2) sequential SVG - OM2 - D1 (3) SVG -rPDA.   4. LVEDP 19mmHg, no aortic stenosis on LV-Ao gradient.    Cath 2/21/2020:  1. Left main: 30% distal stenosis.  2. LAD: 95% diffuse prox-mid disease.  3. LCx: 90% diffuse prox-mid disease.  4. Ramus 80% proximal disease.  5. RCA: 80% diffuse prox-mid disease.  6. 4/4 patent grafts: (1) LIMA to LAD (2) sequential SVG to OM2-D1 (3) SVG to  rPDA.  7. LVEDP 9mmHg, no aortic stenosis on LV-Ao gradient.    Stress Test:    Cardiac Imaging:      Assessment/Plan   Very pleasant 54-year-old gentleman with hypertension, diabetes, dyslipidemia, and coronary artery disease status post 4 vessel CABG in December 2017 (LIMA to LAD, SVG to PDA, SVG to OM, SVG to Diag attached proximally to zaman of OM graft). Recently admitted with chest pain, left heart cath showed patent stents. Echo shows an LVEF 35-40%. Continues to have exertional dyspnea and chest pain. The Lasix will be increased to 40 mg daily. He will call next week to review the symptoms.      Plan   -call with any questions   -continue Aspirin indefinitely and Plavix   -continue Atorvastatin, Zetia, Imdur and Lisinopril   -increase the Lasix to 40 mg daily   -continue Jardiance   -call next week to review symptoms if improved will follow up in three months with an echo   -the Imdur may need to be increased in the future        Jossie Wild, APRN-CNP

## 2024-11-06 ENCOUNTER — APPOINTMENT (OUTPATIENT)
Dept: CARDIOLOGY | Facility: HOSPITAL | Age: 54
End: 2024-11-06
Payer: COMMERCIAL

## 2024-11-12 DIAGNOSIS — R06.02 SHORTNESS OF BREATH: Primary | ICD-10-CM

## 2024-11-12 RX ORDER — ALBUTEROL SULFATE 90 UG/1
2 INHALANT RESPIRATORY (INHALATION) EVERY 4 HOURS PRN
Qty: 8 G | Refills: 5 | Status: SHIPPED | OUTPATIENT
Start: 2024-11-12 | End: 2025-11-12

## 2024-11-15 ENCOUNTER — TELEMEDICINE (OUTPATIENT)
Dept: PHARMACY | Facility: HOSPITAL | Age: 54
End: 2024-11-15
Payer: COMMERCIAL

## 2024-11-15 DIAGNOSIS — I50.42 CHRONIC COMBINED SYSTOLIC AND DIASTOLIC CONGESTIVE HEART FAILURE: ICD-10-CM

## 2024-11-15 DIAGNOSIS — E11.42 TYPE 2 DIABETES MELLITUS WITH DIABETIC POLYNEUROPATHY, WITHOUT LONG-TERM CURRENT USE OF INSULIN: Primary | ICD-10-CM

## 2024-11-15 RX ORDER — IBUPROFEN 200 MG
CAPSULE ORAL
Qty: 200 STRIP | Refills: 3 | Status: SHIPPED | OUTPATIENT
Start: 2024-11-15

## 2024-11-15 RX ORDER — CYANOCOBALAMIN (VITAMIN B-12) 250 MCG
250 TABLET ORAL DAILY
COMMUNITY

## 2024-11-15 NOTE — PROGRESS NOTES
"  Pharmacy Post-Discharge Visit    Arnaldo Balderrama is a 54 y.o. male who was referred to Clinical Pharmacy Team to complete a post-discharge medication optimization and monitoring visit.  The patient was referred for their Congestive Heart Failure and Diabetes.    Pt is here for First appointment.     Patient was recently hospitalized due to NSTEMI. Since being discharged from the hospital he has been doing better but still experiences shortness of breath. Provider prescribed him albuterol to help with SOB. Patient has states that he hasn't used it yet. Noted that he does have an appointment with Respiratory next week to have a lung function test.     Admission Date: 10/20/24  Discharge Date: 10/21/24    Referring Provider: Aminata Manuel APRN-*  Last Visit: 6/19/24  Next visit: 12/18/24    PCP: PHOENIX Yousif-CNP      Subjective   DIABETES MELLITUS TYPE 2:  test strips- not daily once weekly   Diagnosed with diabetes:  long time, since 2002.   Known diabetic complications: neuropathy, lasix surgery .  Does patient follow with Endocrinology: Yes  Last optometry exam:  due for yearly   Most recent visit in Podiatry: no-- patient denies sores or cuts on feet today      Current diabetic medications include:  Glipizide 5 mg tablet bid  Jardiance 25 mg once daily  Metformin 500 mg tablet take 1000 mg bid     Clarifications to above regimen: none  Adverse Effects: None stated at this time    Past diabetic medications include:  Trulicity 3 mg, developed pancreatitis    Glucose Readings:  Glucometer/CGM Type: glucometer,  Patient tests BG : hasn't been testing due to being out of test strips    Any episodes of hypoglycemia? Yes, once in a while (~ 2years) States he feels lightheaded, weak and dizzy.  Did patient treat episode of hypoglycemia appropriately? Yes, with \"candy\"  Does the patient have a prescription for ready-to-use Glucagon? Not on insulin,also when patient feels this way he doesn't check bg " levels  Does pt have proteinuria? N/A    Lifestyle:  Diet: one meals/day. Usually dinners  BK: N/A  LN: N/A  DN: varies  Snacks: left over food from dinner, anything laying around   Drinks: 1/2-2/3 of a liter of water daily , mostly diet mountain dew   Physical Activity: no just works, he works two jobs    Secondary Prevention:  Statin? Yes- atorvastatin 80 mg  ACE-I/ARB? Yes lisinopril   Aspirin? Yes    Pertinent PMH Review:  PMH of Pancreatitis: Yes on GLP-1  PMH of Retinopathy: No  PMH of Urinary Tract Infections: No  PMH of MTC: No  PMH:CHF,HLD,CABG,CAD,HTN,cardiomyopathy, NSTEMI    CONGESTIVE HEART FAILURE ASSESSMENT  Does patient follow with Cardiology: Yes    Date: 11/5/24    Staging  Most recent ejection fraction: 35-40%  NYHA Stage: 3    Guideline-Directed Medical Therapy  -ARNI: No   -If no, then ACEi/ARB?: Yes Lisinopril 20 mg once daily  -Beta Blocker: Yes Metoprolol succinate 25 mg once daily   -MRA: No  -SGLT2i: Yes Jardiance 25 mg once daily    Secondary Prevention:  -The ASCVD Risk score (Manny BARILLAS, et al., 2019) failed to calculate for the following reasons:    Risk score cannot be calculated because patient has a medical history suggesting prior/existing ASCVD   -Aspirin 81mg? yes  -Statin?: Yes, describe: Atorvastatin 80 mg once daily   -HTN?: lisinopril    Past In Office Weight Readings:   Wt Readings from Last 6 Encounters:   11/05/24 105 kg (230 lb 14.4 oz)   10/21/24 99.9 kg (220 lb 3.2 oz)   10/01/24 103 kg (226 lb 6.4 oz)   06/21/24 96.2 kg (212 lb)   06/19/24 97.1 kg (214 lb)   05/31/24 97.5 kg (215 lb)       Monitoring Blood Pressure at Home: N/A    Past In Office BP Readings:   BP Readings from Last 6 Encounters:   11/05/24 168/81   10/21/24 136/88   10/01/24 120/81   06/21/24 136/78   06/19/24 126/82   05/30/24 120/60       HEALTH MANAGEMENT    Maintaining fluid restriction (<2 L/day): Yes  Edema/swelling: No  Shortness of breath: Yes  Trouble sleeping/lying down: N/A  Dry/hacking cough:  N/A  Recent Hospitalizations: Yes, describe: on 10/20 patient had an NSTEMI    EDUCATION/COUNSELING:   - Counseled patient on MOA, expectations, duration of therapy, contraindications, administration, and monitoring parameters  - Counseled patient on lifestyle modifications that can decrease your risk of having complications (smoking cessation, losing weight, daily weights, vaccines)  - Counseled patient on fluid intake and weight management. Recommended to not consume more than 2 liters of fliuids per day. If they have gained more than 2-3 pounds within a 24 hours period (or 5 pounds in a week), contact their cardiologist  - Answered all patient questions and concerns     Notable Medication changes following discharge  Start:   Lasix 20 mg once daily but was increases to 40 mg once daily   Metoprolol XL 25 mg once daily   Pantoprazole 20 mg once daily (replaced omeprazole)  Stop: none  Change:   increased atorvastatin 40 to 80 mg daily  Increased Imdur from 30 to 60 mg     Medication Reconciliation  Changed: none  Added: vitamin b-12 daily   Discontinued: none    Drug Interactions  No relevant drug interactions were noted.    Medication System Management  Patient's preferred pharmacy: [id number]  Adherence/Organization:  no issues  Affordability/Accessibility: no issues      Objective   No Known Allergies  Social History     Social History Narrative    Not on file      Medication Review  Current Outpatient Medications   Medication Instructions    albuterol (Ventolin HFA) 90 mcg/actuation inhaler 2 puffs, inhalation, Every 4 hours PRN    aspirin 81 mg EC tablet 1 tablet, Daily    atorvastatin (LIPITOR) 80 mg, oral, Nightly    cholecalciferol (VITAMIN D-3) 2,000 Units, oral, Daily    clopidogrel (PLAVIX) 75 mg, oral, Daily    cyanocobalamin (VITAMIN B-12) 250 mcg, Daily    ezetimibe (ZETIA) 10 mg, oral, Nightly    furosemide (LASIX) 40 mg, oral, Daily    glipiZIDE (GLUCOTROL) 5 mg, oral, 2 times daily before  meals    isosorbide mononitrate ER (IMDUR) 60 mg, oral, Nightly, Do not crush or chew.    Jardiance 25 mg, oral, Daily    lisinopril 20 mg, oral, Daily    metFORMIN (Glucophage) 500 mg tablet take 2 tablets by mouth (1000mg) by mouth 2 times a day with meals.    metoprolol succinate XL (TOPROL-XL) 25 mg, oral, Daily, Do not crush or chew.    pantoprazole (PROTONIX) 20 mg, oral, Daily before breakfast, Do not crush, chew, or split.      Vitals  BP Readings from Last 2 Encounters:   11/05/24 168/81   10/21/24 136/88     BMI Readings from Last 1 Encounters:   11/05/24 36.16 kg/m²      Labs  A1C  Lab Results   Component Value Date    HGBA1C 8.3 (A) 10/01/2024    HGBA1C 7.5 (A) 06/21/2024    HGBA1C 7.2 (A) 12/21/2023     BMP  Lab Results   Component Value Date    CALCIUM 9.1 10/21/2024     10/21/2024    K 4.0 10/21/2024    CO2 29 10/21/2024    CL 99 10/21/2024    BUN 12 10/21/2024    CREATININE 0.97 10/21/2024    EGFR >90 10/21/2024     LFTs  Lab Results   Component Value Date    ALT 30 10/20/2024    AST 22 10/20/2024    ALKPHOS 60 10/20/2024    BILITOT 0.7 10/20/2024     FLP  Lab Results   Component Value Date    TRIG 88 10/20/2024    CHOL 117 10/20/2024    LDLF 94 09/24/2022    LDLCALC 68 10/20/2024    HDL 31.3 10/20/2024     Urine Microalbumin  Lab Results   Component Value Date    MICROALBCREA  12/29/2023      Comment:      One or more analytes used in this calculation is outside of the analytical measurement range. Calculation cannot be performed.     Wt Readings from Last 3 Encounters:   11/05/24 105 kg (230 lb 14.4 oz)   10/21/24 99.9 kg (220 lb 3.2 oz)   10/01/24 103 kg (226 lb 6.4 oz)      There is no height or weight on file to calculate BMI.         Assessment/Plan   Problem List Items Addressed This Visit      Diabetes, uncontrolled  Goal A1c <7%, currently A1C 8.3%   Continue glipizide 5mg bid and metformin 1000 mg bid.  Will send prescription for glucose test strips  Encouraged patient to check blood  sugars when he feels like his bg is low and discussed the rule of 15.  In the future he may need to increase glipizide. Offer to increase glipizide 5 mg bid to 5 mg in am and 10 mg on the pm , but patient declined at this time.   Would recommend  lifestyles changes related to diet to help control blood sugars.       2.CHF,controlled        a. Goal is to improve QOL and prevent hospitalization           i. Continue metoprolol succinate 25 mg, lisinopril 20 mg             , Jardiance 25 mg,and furosemide 40 mg.          Ii. Patient would benefit from being switched from               lisinopril to Entresto.  Clinical pharmacy follow up as needed per patient preference. Phone number provided.    Continue all meds under the continuation of care with the referring provider and clinical pharmacy team.    Thank you,  Jennifer Valdovinos   PGY1 pharmacy resident   [phone]     Verbal consent to manage patient's drug therapy was obtained from the patient. They were informed they may decline to participate or withdraw from participation in pharmacy services at any time.

## 2024-11-15 NOTE — PROGRESS NOTES
I reviewed the progress note and agree with the resident’s findings and plans as written. Case discussed with resident.    Breanne Knott, PharmD

## 2024-11-19 ENCOUNTER — APPOINTMENT (OUTPATIENT)
Dept: CARDIOLOGY | Facility: HOSPITAL | Age: 54
End: 2024-11-19
Payer: COMMERCIAL

## 2024-11-19 ENCOUNTER — TELEPHONE (OUTPATIENT)
Dept: CARDIOLOGY | Facility: HOSPITAL | Age: 54
End: 2024-11-19
Payer: COMMERCIAL

## 2024-11-19 NOTE — TELEPHONE ENCOUNTER
----- Message from Jossie Haney sent at 11/19/2024  9:58 AM EST -----  Please call and see how he is feeling and see if the swelling has improved   Thanks   PHYSICIAN NEXT STEPS:  Call the Patient    CHIEF COMPLAINT:  Chief Complaint/Protocol Used: Insomnia (A)  Onset: Insomnia      ASSESSMENT:  ? Onset: Insomnia  ? Description: Unable to sleep  ? Onset: 2 weeks   ? Stress: TSH level is unblanced  ? Other Symptoms: Jittery or nervous  -------------------------------------------------------    DISPOSITION:  Disposition Recommendation: See PCP Within 2 Weeks  Questions that led to disposition:  ? Insomnia is an ongoing problem (> 2 weeks)  Patient Directed To: Unspecified  Patient Intended Action: Seek care in the specialist's office        DISPOSITION OVERRIDE/PROVIDER CONSULT:  Disposition Override: N/A  Override Source: Unspecified  Consulted with PCP: No  Consulted with On-Call Physician: No    CALLER CONTACT INFO:  Name: Andrew Hannon (Self)  Phone 1: [phone] (Home Phone)  Phone 2: [phone] (Work Phone)  Phone 3: [phone] (Mobile)      ENCOUNTER STARTED:  08/31/21 10:17:15 AM  ENCOUNTER ASSIGNED TO/CLOSED BY:  Tanisha Angulo @ 08/31/21 10:26:42 AM      -------------------------------------------------------    CARE ADVICE given per Insomnia (A) guideline.  SEE PCP WITHIN 2 WEEKS:  * You need to be seen for this ongoing problem within the next 2 weeks.   * PCP VISIT: Call your doctor (or NP/PA) during regular office hours and make an appointment.  * IF PATIENT HAS NO PCP: A primary care clinic is where you need to be seen for chronic health problems. NOTE: Try to help caller find a PCP (e.g., use a physician referral line). Having a PCP or 'medical home' means better long-term care.      UNDERSTANDS CARE ADVICE: No    AGREES WITH CARE ADVICE: No    WILL FOLLOW CARE ADVICE: No    -------------------------------------------------------

## 2024-11-21 ENCOUNTER — HOSPITAL ENCOUNTER (OUTPATIENT)
Dept: RESPIRATORY THERAPY | Facility: HOSPITAL | Age: 54
Setting detail: THERAPIES SERIES
Discharge: HOME | End: 2024-11-21
Payer: COMMERCIAL

## 2024-11-21 DIAGNOSIS — R06.02 SHORTNESS OF BREATH: ICD-10-CM

## 2024-11-21 PROCEDURE — 2500000002 HC RX 250 W HCPCS SELF ADMINISTERED DRUGS (ALT 637 FOR MEDICARE OP, ALT 636 FOR OP/ED)

## 2024-11-21 PROCEDURE — 94726 PLETHYSMOGRAPHY LUNG VOLUMES: CPT

## 2024-11-21 RX ORDER — ALBUTEROL SULFATE 0.83 MG/ML
SOLUTION RESPIRATORY (INHALATION)
Status: COMPLETED
Start: 2024-11-21 | End: 2024-11-21

## 2024-11-25 DIAGNOSIS — I25.2 HISTORY OF MYOCARDIAL INFARCT AT AGE LESS THAN 60 YEARS: ICD-10-CM

## 2024-11-25 DIAGNOSIS — I21.4 NSTEMI (NON-ST ELEVATED MYOCARDIAL INFARCTION) (MULTI): ICD-10-CM

## 2024-11-25 RX ORDER — ISOSORBIDE MONONITRATE 60 MG/1
60 TABLET, EXTENDED RELEASE ORAL NIGHTLY
Qty: 90 TABLET | Refills: 3 | Status: SHIPPED | OUTPATIENT
Start: 2024-11-25 | End: 2025-11-25

## 2024-11-25 RX ORDER — EZETIMIBE 10 MG/1
10 TABLET ORAL NIGHTLY
Qty: 90 TABLET | Refills: 3 | Status: SHIPPED | OUTPATIENT
Start: 2024-11-25 | End: 2025-11-25

## 2024-11-25 RX ORDER — ATORVASTATIN CALCIUM 80 MG/1
80 TABLET, FILM COATED ORAL NIGHTLY
Qty: 90 TABLET | Refills: 3 | Status: SHIPPED | OUTPATIENT
Start: 2024-11-25 | End: 2025-11-25

## 2024-11-25 RX ORDER — CLOPIDOGREL BISULFATE 75 MG/1
75 TABLET ORAL DAILY
Qty: 90 TABLET | Refills: 3 | Status: SHIPPED | OUTPATIENT
Start: 2024-11-25

## 2024-11-25 RX ORDER — CHOLECALCIFEROL (VITAMIN D3) 50 MCG
2000 TABLET ORAL DAILY
Qty: 90 TABLET | Refills: 3 | Status: SHIPPED | OUTPATIENT
Start: 2024-11-25 | End: 2025-11-25

## 2024-11-25 RX ORDER — PANTOPRAZOLE SODIUM 20 MG/1
20 TABLET, DELAYED RELEASE ORAL
Qty: 90 TABLET | Refills: 3 | Status: SHIPPED | OUTPATIENT
Start: 2024-11-25 | End: 2024-11-26 | Stop reason: WASHOUT

## 2024-11-26 ENCOUNTER — OFFICE VISIT (OUTPATIENT)
Dept: PULMONOLOGY | Facility: CLINIC | Age: 54
End: 2024-11-26
Payer: COMMERCIAL

## 2024-11-26 VITALS
WEIGHT: 231 LBS | OXYGEN SATURATION: 93 % | HEART RATE: 90 BPM | BODY MASS INDEX: 36.26 KG/M2 | DIASTOLIC BLOOD PRESSURE: 79 MMHG | RESPIRATION RATE: 18 BRPM | TEMPERATURE: 96.8 F | HEIGHT: 67 IN | SYSTOLIC BLOOD PRESSURE: 118 MMHG

## 2024-11-26 DIAGNOSIS — K21.9 GASTROESOPHAGEAL REFLUX DISEASE, UNSPECIFIED WHETHER ESOPHAGITIS PRESENT: ICD-10-CM

## 2024-11-26 DIAGNOSIS — R91.1 LUNG NODULE: Primary | ICD-10-CM

## 2024-11-26 DIAGNOSIS — I21.4 NSTEMI (NON-ST ELEVATED MYOCARDIAL INFARCTION) (MULTI): ICD-10-CM

## 2024-11-26 DIAGNOSIS — R06.09 DOE (DYSPNEA ON EXERTION): ICD-10-CM

## 2024-11-26 DIAGNOSIS — J98.4 RESTRICTIVE LUNG DISEASE: ICD-10-CM

## 2024-11-26 PROCEDURE — 3048F LDL-C <100 MG/DL: CPT | Performed by: NURSE PRACTITIONER

## 2024-11-26 PROCEDURE — 99214 OFFICE O/P EST MOD 30 MIN: CPT | Performed by: NURSE PRACTITIONER

## 2024-11-26 PROCEDURE — 3074F SYST BP LT 130 MM HG: CPT | Performed by: NURSE PRACTITIONER

## 2024-11-26 PROCEDURE — 3078F DIAST BP <80 MM HG: CPT | Performed by: NURSE PRACTITIONER

## 2024-11-26 PROCEDURE — 4010F ACE/ARB THERAPY RXD/TAKEN: CPT | Performed by: NURSE PRACTITIONER

## 2024-11-26 PROCEDURE — 3008F BODY MASS INDEX DOCD: CPT | Performed by: NURSE PRACTITIONER

## 2024-11-26 RX ORDER — PANTOPRAZOLE SODIUM 20 MG/1
20 TABLET, DELAYED RELEASE ORAL
Qty: 90 TABLET | Refills: 3 | Status: SHIPPED | OUTPATIENT
Start: 2024-11-26 | End: 2025-11-26

## 2024-11-26 ASSESSMENT — ENCOUNTER SYMPTOMS
DIARRHEA: 0
VOMITING: 0
PALPITATIONS: 0
DIZZINESS: 0
VOICE CHANGE: 0
EYE PAIN: 0
SINUS PRESSURE: 0
FEVER: 0
NAUSEA: 0
WEAKNESS: 0
BACK PAIN: 1
NUMBNESS: 0
ABDOMINAL PAIN: 0
RHINORRHEA: 0
MYALGIAS: 0
NERVOUS/ANXIOUS: 0
AGITATION: 0
HEADACHES: 0
FATIGUE: 1
ARTHRALGIAS: 0
JOINT SWELLING: 0

## 2024-11-26 ASSESSMENT — PAIN SCALES - GENERAL: PAINLEVEL_OUTOF10: 0-NO PAIN

## 2024-11-26 NOTE — PROGRESS NOTES
Patient: Arnaldo Balderrama    [id number]  : 1970 -- AGE 54 y.o.    Provider: PHOENIX Weller-CNP     Location [facility]   Service Date: 2024              [facility]  Follow up visit note      HISTORY OF PRESENT ILLNESS       HISTORY OF PRESENT ILLNESS   Mr. Balderrama is a 53 year old CM (never smoker) here for follow up for abnormal CT chest. Last seen in clinic on 24.      PCP: Dr. Manuel    Since last visit he feels he does not have the endurance/ longevity he did before. He ended up in the ED related to shortness of breath. He was admitted in 10/2024 for an NSTEMI - started on metoprolol/lasix - had been on  imdur (was supposed to be on jardiance, but had been out). He feels the lasix helped initially, but he feels his SIMONS has somewhat plateaued. He has a cough here and there. He has noticed some wheezing when he first wakes up. 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 He is able to walk, but not as fast as he used to. He denies any SOB at rest. He had occasional pain before he went into the hospital - it has been more constant since he has been in the hospital. He states it is on the left side of his chest - reproducible and feels like a sore muscle. He was on omeprazole - switched to pantoprazole, but does not seem like it lasts as long. He drinks 3 12 oz and then a 24 ox of mountain dew a day. He feels this may contribute to his GERD.  He has runny nose here and there.     24: Since last visit he continues to deny any respiratory symptoms. He denies any SOB at rest, cough, wheezing, or CP.  He states he had a little SIMONS yesterday - he was not sure if he over did it.  He denies any allergies. He feels his GERD is under good control on his PPI. Discussed walking more - currently working 80 hours a week at two jobs so feels he does not have time.     24: He denies any respiratory  symptoms. He states he has SIMONS at times when he has a cold. He states his wife laying her head on his chest would cause him discomfort. He goes in/ out of cold at work - can make his nose runny. He does not think he felt sick prior to his CT scan in Dec. He denies any cough, wheezing, SIMONS, SOB at rest, or CP.     12/21/22: Since last visit he hears himself wheezing once a while with a deep breath. Reports SIMONS with pushing himself too much and over exerting himself. Denies cough, SOB at rest, allergies and chest pain. GERD is under good control with omeprazole. He did not go for sleep study as recommended with last visit. He states he feels his day time tiredness is related to not getting enough sleep.     6/13/22 Since last visit He denies any wheezing, SIMONS, SOB at rest, GERD, or CP. A few weeks ago he had some runny nose and post nasal drip. he had a bit of a cough at that time that has since resolved.      3/30/22: He had chills, no fever, sweating, shortness of breath, and chest pain. They could not find anything wrong. He had just that day started his testosterone shots -- did at 4 and went to ED at 10. He and his wife think it was all related to his sugars being low -- he had orange juice and felt better. CS was 71 and then down to 67 - was able to drink some juice and then felt better. He did not have any changes to his DM medications. He is not sure if his testosterone could have effected his blood sugar. He has not had symptoms like this since then -- he did his next injection with eating pasta and did ok with it. He has some SIMONS with working out and at times with wearing a mask. He denies any coughing, wheezing, or SOB at rest. He has a little nasal congestion in the morning, but otherwise denies any respiratory symptoms. His GERD is under good control of omeprazole. He gets intermittent chest pains and has for several years. For some reason he has noticed it is worse in Feb/March and a few years ago he had  a heart cath for similar symptoms. At that time he was told everything looked ok. He describes the pain as a twinge.      Previous pulmonary history: He has no history of recurrent infections, or lung disease as a child. He had no previous lung hx, never on oxygen or inhaler therapy.      Inhalers/nebulized medications: none      Hospitalization History: He has not been hospitalized over the last year for breathing related problem.     Sleep history: He does not snore often. He never wakes up rested -- he doesn't sleep well. He dozes off often in the evening.        ALLERGIES AND MEDICATIONS     ALLERGIES  No Known Allergies    MEDICATIONS  Current Outpatient Medications   Medication Sig Dispense Refill    albuterol (Ventolin HFA) 90 mcg/actuation inhaler Inhale 2 puffs every 4 hours if needed for wheezing or shortness of breath. 8 g 5    aspirin 81 mg EC tablet Take 1 tablet (81 mg) by mouth once daily.      atorvastatin (Lipitor) 80 mg tablet Take 1 tablet (80 mg) by mouth once daily at bedtime. 90 tablet 3    blood sugar diagnostic (Blood Glucose Test) strip Test blood sugar two times daily 200 strip 3    cholecalciferol (Vitamin D-3) 50 MCG (2000 UT) tablet Take 1 tablet (2,000 Units) by mouth once daily. 90 tablet 3    clopidogrel (Plavix) 75 mg tablet Take 1 tablet (75 mg) by mouth once daily. 90 tablet 3    cyanocobalamin (Vitamin B-12) 250 mcg tablet Take 1 tablet (250 mcg) by mouth once daily.      ezetimibe (Zetia) 10 mg tablet Take 1 tablet (10 mg) by mouth once daily at bedtime. 90 tablet 3    furosemide (Lasix) 20 mg tablet Take 2 tablets (40 mg) by mouth once daily. 180 tablet 3    glipiZIDE (Glucotrol) 5 mg tablet Take 1 tablet (5 mg) by mouth 2 times a day before meals. 60 tablet 6    isosorbide mononitrate ER (Imdur) 60 mg 24 hr tablet Take 1 tablet (60 mg) by mouth once daily at bedtime. Do not crush or chew. 90 tablet 3    Jardiance 25 mg Take 1 tablet (25 mg) by mouth once daily. 30 tablet 11     lisinopril 20 mg tablet TAKE ONE TABLET BY MOUTH EVERY DAY 90 tablet 3    metFORMIN (Glucophage) 500 mg tablet take 2 tablets by mouth (1000mg) by mouth 2 times a day with meals. 360 tablet 0    metoprolol succinate XL (Toprol-XL) 25 mg 24 hr tablet Take 1 tablet (25 mg) by mouth once daily. Do not crush or chew. Do not fill before October 22, 2024. 30 tablet 0    pantoprazole (ProtoNix) 20 mg EC tablet Take 1 tablet (20 mg) by mouth once daily in the morning. Take before meals. Do not crush, chew, or split. 90 tablet 3     No current facility-administered medications for this visit.         PAST HISTORY     PAST MEDICAL HISTORY  - HTN   - CAD s/p CABG - 2017                                                                                 - HLD   - DMII                                              - GERD          - pancreatitis      - ? thyroid issues                              PAST SURGICAL HISTORY  Past Surgical History:   Procedure Laterality Date    CARDIAC CATHETERIZATION  03/2020    CARDIAC CATHETERIZATION N/A 10/21/2024    Procedure: Left Heart Cath, No LV, With Grafts;  Surgeon: Shine Schmidt MD;  Location: [facility];  Service: Cardiovascular;  Laterality: N/A;    CARDIAC SURGERY  2017    CORONARY ARTERY BYPASS GRAFT  01/16/2018    CABG    GALLBLADDER SURGERY  06/2023    OTHER SURGICAL HISTORY  12/08/2022    Cataract surgery       IMMUNIZATION HISTORY  Immunization History   Administered Date(s) Administered    Flu vaccine (IIV4), preservative free *Check age/dose* 09/25/2018, 09/15/2021    Flu vaccine, quadrivalent, no egg protein, age 6 month or greater (FLUCELVAX) 09/23/2020, 10/07/2023    Flu vaccine, trivalent, preservative free, age 6 months and greater (Fluarix/Fluzone/Flulaval) 10/03/2016, 10/10/2020    Influenza, Unspecified 10/10/2020, 09/03/2022    Influenza, injectable, quadrivalent 09/01/2019, 09/25/2019    Influenza, injectable, quadrivalent, preservative free, pediatric  09/20/2022    Influenza, trivalent, adjuvanted 09/12/2017    Pfizer COVID-19 vaccine, 12 years and older, (30mcg/0.3mL) (Comirnaty) 10/07/2023    Pfizer Purple Cap SARS-CoV-2 03/17/2021, 04/14/2021, 10/26/2021    Tdap vaccine, age 7 year and older (BOOSTRIX, ADACEL) 06/23/2012, 02/13/2024    Zoster vaccine, recombinant, adult (SHINGRIX) 12/08/2021, 12/08/2022    Zoster, Unspecified 12/08/2021, 12/08/2022       SOCIAL HISTORY  smoking: significant 2nd hand smoke exposure from parents   drinking: none  illicit drug use: none     OCCUPATIONAL/ENVIRONMENTAL HISTORY  Occupation: Currently works in maintenance - machining, welding, insulation      FAMILY HISTORY  GENESIS - mother and two brothers   Lung cancer - father - surgery for removal, grand father, and uncle   COPD - Father     RESULTS/DATA     Pulmonary Function Test Results     11/21/24 - FEV1/FVC 0.89/ FEV1 2.06 (59% + BD resp)/ FVC 2.30 (52% + BD resp)/ TLC 4.30 (66%)/ DLCO 40 -> 96 corrected for volume     Chest Radiograph     XR chest 1 view   Impression  No acute finding.    [id number]      Chest CT Scan     CT chest:   - 5/9/22 - Stable size but with slightly reduced attenuation of a peribronchial focal ground-glass opacity within right upper lobe, measuring approximately 11 mm in average diameter. Although, the findings likely favor underlying inflammatory etiology, attention on follow-up CT scan chest within 6-9 months is recommended to assess stability/document resolution. No definite airspace consolidation, pleural effusion or pneumothorax. Esophagus is mildly patulous throughout its course and correlate with concern for gastroesophageal reflux versus motility disorder. Severe coronary artery calcifications, status post CABG. Redemonstrated layering hyperdensity within the gallbladder,  likely representing gallbladder sludge versus stones. No CT evidence of acute cholecystitis.  - 12/12/22 Similar appearance of a right upper lobe ground-glass nodule as  compared to 05/09/2022 and appears less conspicuous as compared to 02/02/2022.Pulmonary nodules detected on CT images. Nonspecific 4 mm ground-glass nodule the left lower lobe. No further follow-up is required, however, if the nodule morphology is suspicious, consider follow up at 2 and 4 years; if grows or increasingly solid, consider resection. Chronic findings as above.    ----------------------------------     [facility] No CT signs of pulmonary embolism, aortic aneurysm or aortic dissection Small right upper lobe infiltrate. Also there is a 3 mm left lower lobe noncalcified nodule. Follow-up of the infiltrate to complete resolution is needed to exclude underlying neoplasm             . Hyperdense material in the gallbladder either sludge or gallstones. Postthoracotomy changes  -----------  12/13/23 - IMPRESSION:  Mild patchy ground-glass opacities in the right upper lobe, some of  which are nodular appearance, progressed when compared to the  previous study as evidenced by an increase in their size and number.  New similar appearing mild patchy ground-glass opacities in the  lingula.  Interval resolution of the 4 mm ground-glass nodule within  the left lower lobe. Findings are nonspecific and may relate to an  infectious or inflammatory process but will require continued  surveillance.    - 4/24/24 - Improvement of right upper lobe airspace opacities presumed  inflammatory. Some residual focal bronchiectasis detected in the  right upper lobe not significantly changed.  2. New area of subtle ground-glass opacity seen in the left upper  lobe as well as a ground-glass nodule also new. These are highly  likely inflammatory. Conservative management would be a follow-up in  12 months  3. Median sternotomy. Heart size is enlarged. Hepatic steatosis.  Vascular calcification.  4. Small sliding hiatal hernia.      10/20/24: No CT signs of pulmonary embolism.  2. Diffuse ground-glass infiltrates  suspicious for pulmonary edema.  Correlate clinically. Suspected signs of CHF. Small bilateral pleural  effusions      Echocardiogram        Echo: 1/19/18 - EF 50-55% . Abnormal septal motion consistent with post-operative status. Spectral Doppler shows an impaired relaxation pattern of left ventricular diastolic filling. There is mild mitral and tricuspid regurgitation.    Echo: 10/21/24 -  The left ventricular systolic function is moderately decreased, with a visually estimated ejection fraction of 35-40%.   2. There is global hypokinesis of the left ventricle with minor regional variations.   3. Spectral Doppler shows an abnormal pattern of left ventricular diastolic filling.  RA normal size - RV not well visualized         [facility] - 10/21/24 - Left main: 100% distal stenosis.   2. RCA: 80% diffuse prox-mid disease.   3. 4/4 patent grafts: (1) LIMA - LAD (2) sequential SVG - OM2 - D1 (3) SVG -rPDA.   4. LVEDP 19mmHg, no aortic stenosis on LV-Ao gradient.     Labs/ Other testing        REVIEW OF SYSTEMS     REVIEW OF SYSTEMS  Review of Systems   Constitutional:  Positive for fatigue. Negative for fever.   HENT:  Negative for congestion, postnasal drip, rhinorrhea, sinus pressure and voice change.    Eyes:  Negative for pain and visual disturbance.   Cardiovascular:  Positive for chest pain and leg swelling. Negative for palpitations.   Gastrointestinal:  Negative for abdominal pain, diarrhea, nausea and vomiting.   Endocrine: Negative for cold intolerance and heat intolerance.   Musculoskeletal:  Positive for back pain. Negative for arthralgias, joint swelling and myalgias.   Skin:  Negative for rash.   Neurological:  Negative for dizziness, weakness, numbness and headaches.   Psychiatric/Behavioral:  Negative for agitation. The patient is not nervous/anxious.          PHYSICAL EXAM     VITAL SIGNS: There were no vitals taken for this visit.     CURRENT WEIGHT: [unfilled]  BMI: [unfilled]  PREVIOUS WEIGHTS:  Wt Readings  from Last 3 Encounters:   11/05/24 105 kg (230 lb 14.4 oz)   10/21/24 99.9 kg (220 lb 3.2 oz)   10/01/24 103 kg (226 lb 6.4 oz)       Physical Exam  Vitals reviewed.   Constitutional:       General: He is not in acute distress.     Appearance: Normal appearance. He is obese. He is not ill-appearing or toxic-appearing.   HENT:      Head: Normocephalic.      Nose: No rhinorrhea.   Cardiovascular:      Rate and Rhythm: Normal rate and regular rhythm.      Heart sounds: Normal heart sounds.   Pulmonary:      Effort: Pulmonary effort is normal. No respiratory distress.      Breath sounds: Normal breath sounds. No stridor. No wheezing, rhonchi or rales.      Comments: Slight wheeze LLL  Abdominal:      General: Abdomen is flat.   Musculoskeletal:         General: Normal range of motion.      Right lower leg: No edema.      Left lower leg: No edema.   Skin:     General: Skin is warm and dry.      Nails: There is no clubbing.   Neurological:      General: No focal deficit present.      Mental Status: He is alert and oriented to person, place, and time.   Psychiatric:         Mood and Affect: Mood normal.         Behavior: Behavior normal.         Judgment: Judgment normal.       ASSESSMENT/PLAN       1. Abnormal CT chest: small area of infiltrate and small nodule-- exam stable. New areas of ground glass/ inflammation - no infectious symptoms at that time. CT 10/2024 during NSTEMI with GGO - likely related to edema   - will get CT chest in 3 months      2. Concern for GENESIS: excessive daytime sleepiness   - referral to sleep medicine at last visit -- declined     3. SIMONS: recent NSTEMI with reduced EF. Started on meds/ lasix by cardiology. PFTs without obstruction - restricted with BD resp.   - start symbicort 160 2 puffs twice daily - rinse mouth out afterwards   - continue albuterol 2 puffs every 4-6 hours as needed   - continue to follow with cardiology   - discussed reducing diet mountain dew intake - drinking 60oz of diet  mountain dew a day       Thank you for visiting the Pulmonary clinic today!   Return to clinic 3-4 months after CT chest or sooner if needed   Jazlyn Blas CNP  My office -  [phone]- Myrtle is my  and Ambika is my nurse.   Radiology scheduling [phone]   Appointment scheduling [phone]   Pulmonary function testing - [phone]

## 2024-11-26 NOTE — LETTER
November 26, 2024     Patient: Arnaldo Balderrama   YOB: 1970   Date of Visit: 11/26/2024       To Whom It May Concern:    Arnaldo Balderrama was seen in my clinic on 11/26/2024 at 3:00 pm. Please excuse Arnaldo for his absence from work on this day to make the appointment.    If you have any questions or concerns, please don't hesitate to call.         Sincerely,         Jazlyn Blas, APRN-CNP        CC: No Recipients

## 2024-11-26 NOTE — PATIENT INSTRUCTIONS
1. Abnormal CT chest: small area of infiltrate and small nodule-- exam stable. New areas of ground glass/ inflammation - no infectious symptoms at that time. CT 10/2024 during NSTEMI with GGO - likely related to edema   - will get CT chest in  3 months      2. Concern for GENESIS: excessive daytime sleepiness   - referral to sleep medicine at last visit -- declined     3. SIMONS: recent NSTEMI with reduced EF. Started on meds/ lasix by cardiology.   - pending Pfts  - continue to follow with cardiology   - discussed reducing diet mountain dew intake - drinking 60oz of diet mountain dew a day       Thank you for visiting the Pulmonary clinic today!   Return to clinic 3-4 months after CT chest or sooner if needed   Jazlyn Blas CNP  My office -  [phone]- Myrtle is my  and Ambika is my nurse.   Radiology scheduling [phone]   Appointment scheduling [phone]   Pulmonary function testing - [phone]

## 2024-11-27 RX ORDER — BUDESONIDE AND FORMOTEROL FUMARATE DIHYDRATE 160; 4.5 UG/1; UG/1
2 AEROSOL RESPIRATORY (INHALATION) 2 TIMES DAILY
Qty: 10.2 G | Refills: 3 | Status: SHIPPED | OUTPATIENT
Start: 2024-11-27

## 2024-12-02 DIAGNOSIS — I21.4 NSTEMI (NON-ST ELEVATED MYOCARDIAL INFARCTION) (MULTI): ICD-10-CM

## 2024-12-02 RX ORDER — METOPROLOL SUCCINATE 25 MG/1
25 TABLET, EXTENDED RELEASE ORAL DAILY
Qty: 90 TABLET | Refills: 3 | Status: SHIPPED | OUTPATIENT
Start: 2024-12-02 | End: 2025-12-02

## 2024-12-16 DIAGNOSIS — E11.69 TYPE 2 DIABETES MELLITUS WITH OBESITY (MULTI): ICD-10-CM

## 2024-12-16 DIAGNOSIS — E66.9 TYPE 2 DIABETES MELLITUS WITH OBESITY (MULTI): ICD-10-CM

## 2024-12-16 RX ORDER — METFORMIN HYDROCHLORIDE 500 MG/1
1000 TABLET ORAL
Qty: 360 TABLET | Refills: 3 | Status: SHIPPED | OUTPATIENT
Start: 2024-12-16

## 2024-12-18 ENCOUNTER — APPOINTMENT (OUTPATIENT)
Dept: PRIMARY CARE | Facility: CLINIC | Age: 54
End: 2024-12-18
Payer: COMMERCIAL

## 2024-12-18 VITALS
HEART RATE: 70 BPM | BODY MASS INDEX: 36.13 KG/M2 | SYSTOLIC BLOOD PRESSURE: 136 MMHG | HEIGHT: 67 IN | DIASTOLIC BLOOD PRESSURE: 80 MMHG | OXYGEN SATURATION: 96 % | WEIGHT: 230.2 LBS

## 2024-12-18 DIAGNOSIS — I10 ESSENTIAL HYPERTENSION, BENIGN: Primary | ICD-10-CM

## 2024-12-18 DIAGNOSIS — I25.2 HISTORY OF MYOCARDIAL INFARCT AT AGE LESS THAN 60 YEARS: ICD-10-CM

## 2024-12-18 DIAGNOSIS — Z23 NEED FOR VACCINATION: ICD-10-CM

## 2024-12-18 PROCEDURE — 4010F ACE/ARB THERAPY RXD/TAKEN: CPT | Performed by: NURSE PRACTITIONER

## 2024-12-18 PROCEDURE — 90677 PCV20 VACCINE IM: CPT | Performed by: NURSE PRACTITIONER

## 2024-12-18 PROCEDURE — 3079F DIAST BP 80-89 MM HG: CPT | Performed by: NURSE PRACTITIONER

## 2024-12-18 PROCEDURE — 99213 OFFICE O/P EST LOW 20 MIN: CPT | Performed by: NURSE PRACTITIONER

## 2024-12-18 PROCEDURE — 90472 IMMUNIZATION ADMIN EACH ADD: CPT | Performed by: NURSE PRACTITIONER

## 2024-12-18 PROCEDURE — 3048F LDL-C <100 MG/DL: CPT | Performed by: NURSE PRACTITIONER

## 2024-12-18 PROCEDURE — 90656 IIV3 VACC NO PRSV 0.5 ML IM: CPT | Performed by: NURSE PRACTITIONER

## 2024-12-18 PROCEDURE — 3075F SYST BP GE 130 - 139MM HG: CPT | Performed by: NURSE PRACTITIONER

## 2024-12-18 PROCEDURE — 90471 IMMUNIZATION ADMIN: CPT | Performed by: NURSE PRACTITIONER

## 2024-12-18 PROCEDURE — 3008F BODY MASS INDEX DOCD: CPT | Performed by: NURSE PRACTITIONER

## 2024-12-18 NOTE — PROGRESS NOTES
"Subjective   Chief Complaint   Patient presents with    Follow-up     Arnaldo Balderrama is a 54 y.o. male is here today for a follow up. Patient states nose bleeds daily for the last week.        Patient ID: Arnaldo Balderrama is a 54 y.o. male who presents for Follow-up (Arnaldo Balderrama is a 54 y.o. male is here today for a follow up. Patient states nose bleeds daily for the last week. ).    HPI  Arnaldo is a 53 yo male presenting today for 6 month f/u     Dx: CAD s/p CABG X 4 (2017), HTN, HLD,T2DM, GERD, NSTEMI (2024)    Pt had CT chest done in May 2024 for RUL nodule  no change @ 11 mm   LDCT due Dec 2024    Pt states he had an MI in Oct 2024  Hospitalized x 2 nights at [facility]   Had heart cath   Feels tired   Denies CP/Palpitations   Followed by cardiology   Med changes noted     #HYPOGONADISM   seeing Dr. Barr, urology  started testosterone injections ---> Testosterone= 249 Sept 2022  Testosterone d/c'd in June 2022 per Dr. Montiel, Kurt b/c \"level was normal\"     #CAD s/p CABG  cardiologist Dr. Schmidt/Juliano; LOV Nov 2024  stable on Plavix, ASA, statin, Isosorbide  Pt states he had an MI in Oct 2024, hospitalized at [facility] x 2 days ---> added Lasix, Plavix and Metoprolol, increased statin and isosorbide doses   Heart cath---> 100% stenosis to LEFT MCA, 80% Proximal/Mid RCA       #HTN  Rx lisinopril 20 mg daily  BP today: 136/80  NONSMOKER      #HLD  Rx Atorvastatin  FLP done Oct 2024     #T2DM  Dx 2002  Followed by Dr. Demetria Hicks  Rx: Jardiance, Metformin  7.2%  Dec 2023--->8.3% Oct 2024  last eye exam: Jan 2024   Neuropathy: b/l feet, does not take medication   Pt was in ED in May for pancreatitis   Rare ETOH use     #HYPOTHYROIDISM  Off levothyroxine since Aug 2020  TSH = 2.97 Dec 2023     #GERD  Taking Prilosec OTC  sx controlled      #HM  COLON: UTD  PSA: 2022      No Known Allergies    Review of Systems  ROS was completed and all systems are negative with the exception of what was noted in the the " "HPI.     Objective   /80   Pulse 70   Ht 1.702 m (5' 7\")   Wt 104 kg (230 lb 3.2 oz)   SpO2 96%   BMI 36.05 kg/m²      Current Outpatient Medications   Medication Instructions    albuterol (Ventolin HFA) 90 mcg/actuation inhaler 2 puffs, inhalation, Every 4 hours PRN    aspirin 81 mg EC tablet 1 tablet, Daily    atorvastatin (LIPITOR) 80 mg, oral, Nightly    blood sugar diagnostic (Blood Glucose Test) strip Test blood sugar two times daily    budesonide-formoteroL (Symbicort) 160-4.5 mcg/actuation inhaler 2 puffs, inhalation, 2 times daily, Rinse mouth with water after use to reduce aftertaste and incidence of candidiasis. Do not swallow.    cholecalciferol (VITAMIN D-3) 2,000 Units, oral, Daily    clopidogrel (PLAVIX) 75 mg, oral, Daily    cyanocobalamin (VITAMIN B-12) 250 mcg, Daily    ezetimibe (ZETIA) 10 mg, oral, Nightly    furosemide (LASIX) 40 mg, oral, Daily    glipiZIDE (GLUCOTROL) 5 mg, oral, 2 times daily before meals    isosorbide mononitrate ER (IMDUR) 60 mg, oral, Nightly, Do not crush or chew.    Jardiance 25 mg, oral, Daily    lisinopril 20 mg, oral, Daily    metFORMIN (GLUCOPHAGE) 1,000 mg, oral, 2 times daily (morning and late afternoon)    metoprolol succinate XL (TOPROL-XL) 25 mg, oral, Daily, Do not crush or chew.    pantoprazole (PROTONIX) 20 mg, oral, Daily before breakfast, Do not crush, chew, or split.         Physical Exam  Vitals reviewed.   Cardiovascular:      Pulses: Normal pulses.      Heart sounds: Normal heart sounds.   Pulmonary:      Effort: Pulmonary effort is normal.      Breath sounds: Normal breath sounds.   Skin:     General: Skin is warm and dry.   Neurological:      Mental Status: He is alert and oriented to person, place, and time.   Psychiatric:         Mood and Affect: Mood normal.         Assessment & Plan  Essential hypertension, benign  Stable today: 136/80  Continue lisinopril 20 mg daily  Regular daily exercise        History of myocardial infarct at age " less than 60 years  Followed by cardiology, Dr. Schmidt        Need for vaccination    Orders:    Flu vaccine, trivalent, preservative free, age 6 months and greater (Fluarix/Fluzone/Flulaval)    Pneumococcal conjugate vaccine, 20-valent (PREVNAR 20)

## 2024-12-18 NOTE — PATIENT INSTRUCTIONS
Thank you for seeing me today Arnaldo Balderrama, it was a pleasure to see you again!    Continue medications as prescribed    Try nasal saline spray for nosebleeds    Flu and Prevnar 20 vaccines today    For assistance with scheduling referrals or consultations, please call [phone] or [phone].    For laboratory, radiology, and other tests, please call [phone] ([phone] for pediatrics).   For laboratory locations, please visit [email or web address]   If you do not get results within 7-10 days, or you have any questions or concerns, please send a message, call the office ([phone]), or return to the office for a follow-up appointment.     For acute/sick visits, if you are unable to get an office visit, you can do a  On Demand Virtual Visit that is accessible via your My Chart account.  For emergencies, call 9-1-1 or go to the nearest Emergency Department.     Please schedule additional appointment(s) to address concern(s) not addressed today.    Please review prescription inserts and published information for possible adverse effects of all medications.     In general, results are discussed over the phone or via  [employer].     You can see your health information, review clinical summaries from office visits & test results online when you follow your health with MY  Chart, a personal health record.   To sign up go to [email or web address].   If you need assistance with signing up or trouble getting into your account call [employer] Patient Line 24/7 at [phone]     RTC 3 MONTHS AND AS NEEDED     Have a nice day and Happy Holidays!     Gavi Manuel NP

## 2024-12-19 PROBLEM — E66.811 CLASS 1 OBESITY DUE TO EXCESS CALORIES WITH SERIOUS COMORBIDITY AND BODY MASS INDEX (BMI) OF 33.0 TO 33.9 IN ADULT: Status: RESOLVED | Noted: 2024-06-19 | Resolved: 2024-12-19

## 2024-12-19 PROBLEM — E66.09 CLASS 1 OBESITY DUE TO EXCESS CALORIES WITH SERIOUS COMORBIDITY AND BODY MASS INDEX (BMI) OF 33.0 TO 33.9 IN ADULT: Status: RESOLVED | Noted: 2024-06-19 | Resolved: 2024-12-19

## 2025-01-14 NOTE — PROGRESS NOTES
HPI   53 yo with Diabetes 2 (dx in his 30's), HTN, Dyslipidemia, CVD s/p cabg , gout, presents for followup. A1c 8.2% last visit, today 7.4%.      Pt is testing sugars <1 times per day. Pt is having low sugars 0 times/week. Pt's typical blood sugars are running 130's on waking, infrequent tests in the evening . Pt is following a carb controlled diet and knows reasonable carb allowances. Pt is able to afford their medications. Pt is active at work.           Taking metformin 500mger (4), jardiance 25mg, glipizide 5mg bid    -no tzd, fluid retention concerns with cardiac history per previous notes    -as pt has no gallbladder, doesn't drink alcohol with a mild case of pancreatitis would simply hold trulicity/glp-1's as we have no other good cause   Patient had ER visit and diagnosed with abdominal pain/constipation/idiopathic acute pancreatitis without infection or necrosis (normal lipase) followed up by GI (05/2024).            Taking atorvastatin 40mg, zetia 10mg and tolerating.           Taking lisinopril 20mg, imdur 30mg for htn/cvd.     Patient had ER visit and diagnosed with abdominal pain/constipation/idiopathic acute pancreatitis without infection or necrosis (normal lipase) followed up by GI (05/2024).             Current Outpatient Medications:     albuterol (Ventolin HFA) 90 mcg/actuation inhaler, Inhale 2 puffs every 4 hours if needed for wheezing or shortness of breath., Disp: 8 g, Rfl: 5    aspirin 81 mg EC tablet, Take 1 tablet (81 mg) by mouth once daily., Disp: , Rfl:     atorvastatin (Lipitor) 80 mg tablet, Take 1 tablet (80 mg) by mouth once daily at bedtime., Disp: 90 tablet, Rfl: 3    blood sugar diagnostic (Blood Glucose Test) strip, Test blood sugar two times daily, Disp: 200 strip, Rfl: 3    budesonide-formoteroL (Symbicort) 160-4.5 mcg/actuation inhaler, Inhale 2 puffs 2 times a day. Rinse mouth with water after use to reduce aftertaste and incidence of candidiasis. Do not swallow., Disp: 10.2  g, Rfl: 3    cholecalciferol (Vitamin D-3) 50 MCG (2000 UT) tablet, Take 1 tablet (2,000 Units) by mouth once daily., Disp: 90 tablet, Rfl: 3    clopidogrel (Plavix) 75 mg tablet, Take 1 tablet (75 mg) by mouth once daily., Disp: 90 tablet, Rfl: 3    cyanocobalamin (Vitamin B-12) 250 mcg tablet, Take 1 tablet (250 mcg) by mouth once daily., Disp: , Rfl:     ezetimibe (Zetia) 10 mg tablet, Take 1 tablet (10 mg) by mouth once daily at bedtime., Disp: 90 tablet, Rfl: 3    furosemide (Lasix) 20 mg tablet, Take 2 tablets (40 mg) by mouth once daily., Disp: 180 tablet, Rfl: 3    glipiZIDE (Glucotrol) 5 mg tablet, Take 1 tablet (5 mg) by mouth 2 times a day before meals., Disp: 60 tablet, Rfl: 6    isosorbide mononitrate ER (Imdur) 60 mg 24 hr tablet, Take 1 tablet (60 mg) by mouth once daily at bedtime. Do not crush or chew., Disp: 90 tablet, Rfl: 3    Jardiance 25 mg, Take 1 tablet (25 mg) by mouth once daily., Disp: 30 tablet, Rfl: 11    lisinopril 20 mg tablet, TAKE ONE TABLET BY MOUTH EVERY DAY, Disp: 90 tablet, Rfl: 3    metFORMIN (Glucophage) 500 mg tablet, TAKE TWO TABLETS BY MOUTH TWO TIMES A DAY WITH MEALS, Disp: 360 tablet, Rfl: 3    metoprolol succinate XL (Toprol-XL) 25 mg 24 hr tablet, Take 1 tablet (25 mg) by mouth once daily. Do not crush or chew., Disp: 90 tablet, Rfl: 3    pantoprazole (ProtoNix) 20 mg EC tablet, Take 1 tablet (20 mg) by mouth once daily in the morning. Take before meals. Do not crush, chew, or split., Disp: 90 tablet, Rfl: 3      Allergies as of 01/15/2025    (No Known Allergies)         Review of Systems   Cardiology: Lightheadedness-denies.  Chest pain-denies.  Leg edema-denies.  Palpitations-denies.  Respiratory: Cough-denies. Shortness of breath-denies.  Wheezing-denies.  Gastroenterology: Constipation-denies.  Diarrhea-denies.  Heartburn-denies.  Endocrinology: Cold intolerance-denies.  Heat intolerance-denies.  Sweats-denies.  Neurology: Headache-denies.  Tremor-denies.  Neuropathy  "in extremities-at times  Psychology: Low energy-denies.  Irritability-denies.  Sleep disturbances-denies.      /72   Pulse 75   Ht 1.702 m (5' 7\")   Wt 108 kg (239 lb)   BMI 37.43 kg/m²       Labs:  Lab Results   Component Value Date    WBC 8.9 10/21/2024    NRBC 0.0 10/21/2024    RBC 5.40 10/21/2024    HGB 15.5 10/21/2024    HCT 46.0 10/21/2024     10/21/2024     Lab Results   Component Value Date    CALCIUM 9.1 10/21/2024    AST 22 10/20/2024    ALKPHOS 60 10/20/2024    BILITOT 0.7 10/20/2024    PROT 6.9 10/20/2024    ALBUMIN 3.9 10/21/2024    GLOB 3.1 06/12/2023    AGR 1.3 (L) 06/12/2023     10/21/2024    K 4.0 10/21/2024    CL 99 10/21/2024    CO2 29 10/21/2024    ANIONGAP 13 10/21/2024    BUN 12 10/21/2024    CREATININE 0.97 10/21/2024    UREACREAUR 12.5 06/12/2023    GLUCOSE 183 (H) 10/21/2024    ALT 30 10/20/2024    EGFR >90 10/21/2024     Lab Results   Component Value Date    CHOL 117 10/20/2024    TRIG 88 10/20/2024    HDL 31.3 10/20/2024    LDLCALC 68 10/20/2024     Lab Results   Component Value Date    MICROALBCREA  12/29/2023      Comment:      One or more analytes used in this calculation is outside of the analytical measurement range. Calculation cannot be performed.     Lab Results   Component Value Date    TSH 2.97 12/29/2023     Lab Results   Component Value Date    [id number] 783 12/29/2023     Lab Results   Component Value Date    HGBA1C 7.4 (A) 01/15/2025         Physical Exam   General Appearance: pleasant, cooperative, no acute distress  HEENT: no chemosis, no proptosis, no lid lag, no lid retraction  Neck: no lymphadenopathy, no thyromegaly, no dominant thyroid nodules  Heart: no murmurs, regular rate and rhythm, S1 and S2  Lungs: no wheezes, no rhonci, no rales  Extremities: no lower extremity swelling      Assessment/Plan   1. Type 2 diabetes mellitus with diabetic polyneuropathy, without long-term current use of insulin (Primary)  -A1c ordered and reviewed  -glycemic " log reviewed  -labs reviewed    -doing well with SAMUEL, watch for low sugars  -asked again for pt to test every day at different times of day  -if having repeat lows lower SAMUEL to every day/call office    2. Dyslipidemia  -on high dose statin, labs reviewed, will follow    3. Essential hypertension, benign  -at target on therapy    4. Vitamin B12 deficiency               Follow Up:  Amanda 6 months    -labs/tests/notes reviewed  -reviewed and counseled patient on medication monitoring and side effects

## 2025-01-15 ENCOUNTER — APPOINTMENT (OUTPATIENT)
Dept: ENDOCRINOLOGY | Facility: CLINIC | Age: 55
End: 2025-01-15
Payer: COMMERCIAL

## 2025-01-15 VITALS
SYSTOLIC BLOOD PRESSURE: 126 MMHG | HEART RATE: 75 BPM | HEIGHT: 67 IN | DIASTOLIC BLOOD PRESSURE: 72 MMHG | BODY MASS INDEX: 37.51 KG/M2 | WEIGHT: 239 LBS

## 2025-01-15 DIAGNOSIS — E11.42 TYPE 2 DIABETES MELLITUS WITH DIABETIC POLYNEUROPATHY, WITHOUT LONG-TERM CURRENT USE OF INSULIN: Primary | ICD-10-CM

## 2025-01-15 DIAGNOSIS — E53.8 VITAMIN B12 DEFICIENCY: ICD-10-CM

## 2025-01-15 DIAGNOSIS — R53.83 OTHER FATIGUE: ICD-10-CM

## 2025-01-15 DIAGNOSIS — I10 ESSENTIAL HYPERTENSION, BENIGN: ICD-10-CM

## 2025-01-15 DIAGNOSIS — E78.5 DYSLIPIDEMIA: ICD-10-CM

## 2025-01-15 LAB — POC HEMOGLOBIN A1C: 7.4 % (ref 4.2–6.5)

## 2025-01-15 PROCEDURE — 3008F BODY MASS INDEX DOCD: CPT | Performed by: INTERNAL MEDICINE

## 2025-01-15 PROCEDURE — 3074F SYST BP LT 130 MM HG: CPT | Performed by: INTERNAL MEDICINE

## 2025-01-15 PROCEDURE — 83036 HEMOGLOBIN GLYCOSYLATED A1C: CPT | Performed by: INTERNAL MEDICINE

## 2025-01-15 PROCEDURE — 99214 OFFICE O/P EST MOD 30 MIN: CPT | Performed by: INTERNAL MEDICINE

## 2025-01-15 PROCEDURE — 4010F ACE/ARB THERAPY RXD/TAKEN: CPT | Performed by: INTERNAL MEDICINE

## 2025-01-15 PROCEDURE — 3078F DIAST BP <80 MM HG: CPT | Performed by: INTERNAL MEDICINE

## 2025-01-15 PROCEDURE — 1036F TOBACCO NON-USER: CPT | Performed by: INTERNAL MEDICINE

## 2025-01-15 ASSESSMENT — ENCOUNTER SYMPTOMS
LOSS OF SENSATION IN FEET: 0
DEPRESSION: 0
OCCASIONAL FEELINGS OF UNSTEADINESS: 0

## 2025-01-15 ASSESSMENT — LIFESTYLE VARIABLES
HOW OFTEN DO YOU HAVE SIX OR MORE DRINKS ON ONE OCCASION: NEVER
HOW OFTEN DO YOU HAVE A DRINK CONTAINING ALCOHOL: NEVER
SKIP TO QUESTIONS 9-10: 1
HOW MANY STANDARD DRINKS CONTAINING ALCOHOL DO YOU HAVE ON A TYPICAL DAY: PATIENT DOES NOT DRINK
AUDIT-C TOTAL SCORE: 0

## 2025-01-15 ASSESSMENT — PAIN SCALES - GENERAL: PAINLEVEL_OUTOF10: 0-NO PAIN

## 2025-02-02 ENCOUNTER — HOSPITAL ENCOUNTER (OUTPATIENT)
Dept: RADIOLOGY | Facility: HOSPITAL | Age: 55
Discharge: HOME | End: 2025-02-02
Payer: COMMERCIAL

## 2025-02-02 DIAGNOSIS — R91.1 LUNG NODULE: ICD-10-CM

## 2025-02-02 PROCEDURE — 71250 CT THORAX DX C-: CPT

## 2025-02-02 PROCEDURE — 71250 CT THORAX DX C-: CPT | Performed by: RADIOLOGY

## 2025-03-12 ENCOUNTER — OFFICE VISIT (OUTPATIENT)
Dept: PULMONOLOGY | Facility: HOSPITAL | Age: 55
End: 2025-03-12
Payer: COMMERCIAL

## 2025-03-12 VITALS
DIASTOLIC BLOOD PRESSURE: 74 MMHG | SYSTOLIC BLOOD PRESSURE: 140 MMHG | BODY MASS INDEX: 35.6 KG/M2 | RESPIRATION RATE: 20 BRPM | WEIGHT: 227.29 LBS | OXYGEN SATURATION: 98 % | TEMPERATURE: 97.3 F | HEART RATE: 91 BPM

## 2025-03-12 DIAGNOSIS — R91.1 LUNG NODULE: ICD-10-CM

## 2025-03-12 DIAGNOSIS — R06.09 DOE (DYSPNEA ON EXERTION): ICD-10-CM

## 2025-03-12 DIAGNOSIS — J98.4 RESTRICTIVE LUNG DISEASE: Primary | ICD-10-CM

## 2025-03-12 DIAGNOSIS — K21.9 GASTROESOPHAGEAL REFLUX DISEASE, UNSPECIFIED WHETHER ESOPHAGITIS PRESENT: ICD-10-CM

## 2025-03-12 PROCEDURE — 4010F ACE/ARB THERAPY RXD/TAKEN: CPT | Performed by: NURSE PRACTITIONER

## 2025-03-12 PROCEDURE — 3078F DIAST BP <80 MM HG: CPT | Performed by: NURSE PRACTITIONER

## 2025-03-12 PROCEDURE — 1036F TOBACCO NON-USER: CPT | Performed by: NURSE PRACTITIONER

## 2025-03-12 PROCEDURE — 99214 OFFICE O/P EST MOD 30 MIN: CPT | Performed by: NURSE PRACTITIONER

## 2025-03-12 PROCEDURE — 3077F SYST BP >= 140 MM HG: CPT | Performed by: NURSE PRACTITIONER

## 2025-03-12 RX ORDER — BUDESONIDE AND FORMOTEROL FUMARATE DIHYDRATE 160; 4.5 UG/1; UG/1
2 AEROSOL RESPIRATORY (INHALATION) 2 TIMES DAILY
Qty: 10.2 G | Refills: 11 | Status: SHIPPED | OUTPATIENT
Start: 2025-03-12

## 2025-03-12 ASSESSMENT — ENCOUNTER SYMPTOMS
JOINT SWELLING: 0
HEADACHES: 0
DIZZINESS: 0
RHINORRHEA: 0
SINUS PRESSURE: 0
FATIGUE: 0
VOICE CHANGE: 0
BACK PAIN: 0
VOMITING: 0
DIARRHEA: 0
MYALGIAS: 0
NERVOUS/ANXIOUS: 0
FEVER: 0
ARTHRALGIAS: 0
WEAKNESS: 0
NAUSEA: 0
EYE PAIN: 0
ABDOMINAL PAIN: 0
NUMBNESS: 0
PALPITATIONS: 0
AGITATION: 0

## 2025-03-12 ASSESSMENT — PAIN SCALES - GENERAL: PAINLEVEL_OUTOF10: 3

## 2025-03-12 NOTE — PROGRESS NOTES
Patient: Arnaldo Balderrama    [id number]  : 1970 -- AGE 54 y.o.    Provider: PHOENIX Weller-CNP     Location [facility]   Service Date: 3/12/2025              [facility]  Follow up visit note      HISTORY OF PRESENT ILLNESS       HISTORY OF PRESENT ILLNESS   Mr. Balderrama is a 53 year old CM (never smoker) here for follow up for abnormal CT chest. Last seen in clinic on 24.      PCP: Dr. Manuel    Since last visit he was able to get the symbicort - has been using twice daily. He has not noticed much difference.  He has only used it twice - when it was cold out. He did feel like it helped. He had a cough here and there, but not normally. He has been wheezing - does not feel better with the symbicort. He has SIMONS with walking on level ground after a few minutes. He does have occasional chest pain here and there. He denies any SOB at rest. He has had some runny nose here and there. GERD under good control on pantoprazole. Mountain dew intake - was drinking 60oz a day.      24: Since last visit he feels he does not have the endurance/ longevity he did before. He ended up in the ED related to shortness of breath. He was admitted in 10/2024 for an NSTEMI - started on metoprolol/lasix - had been on  imdur (was supposed to be on jardiance, but had been out). He feels the lasix helped initially, but he feels his SIMONS has somewhat plateaued. He has a cough here and there. He has noticed some wheezing when he first wakes up. 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 He is able to walk, but not as fast as he used to. He denies any SOB at rest. He had occasional pain before he went into the hospital - it has been more constant since he has been in the hospital. He states it is on the left side of his chest - reproducible and feels like a sore muscle. He was on omeprazole - switched to pantoprazole, but does not seem  like it lasts as long. He drinks 3 12 oz and then a 24 ox of mountain dew a day. He feels this may contribute to his GERD.  He has runny nose here and there.     5/1/24: Since last visit he continues to deny any respiratory symptoms. He denies any SOB at rest, cough, wheezing, or CP.  He states he had a little SIMONS yesterday - he was not sure if he over did it.  He denies any allergies. He feels his GERD is under good control on his PPI. Discussed walking more - currently working 80 hours a week at two jobs so feels he does not have time.     1/24/24: He denies any respiratory symptoms. He states he has SIMONS at times when he has a cold. He states his wife laying her head on his chest would cause him discomfort. He goes in/ out of cold at work - can make his nose runny. He does not think he felt sick prior to his CT scan in Dec. He denies any cough, wheezing, SIMONS, SOB at rest, or CP.     12/21/22: Since last visit he hears himself wheezing once a while with a deep breath. Reports SIMONS with pushing himself too much and over exerting himself. Denies cough, SOB at rest, allergies and chest pain. GERD is under good control with omeprazole. He did not go for sleep study as recommended with last visit. He states he feels his day time tiredness is related to not getting enough sleep.     6/13/22 Since last visit He denies any wheezing, SIMONS, SOB at rest, GERD, or CP. A few weeks ago he had some runny nose and post nasal drip. he had a bit of a cough at that time that has since resolved.      3/30/22: He had chills, no fever, sweating, shortness of breath, and chest pain. They could not find anything wrong. He had just that day started his testosterone shots -- did at 4 and went to ED at 10. He and his wife think it was all related to his sugars being low -- he had orange juice and felt better. CS was 71 and then down to 67 - was able to drink some juice and then felt better. He did not have any changes to his DM medications. He  is not sure if his testosterone could have effected his blood sugar. He has not had symptoms like this since then -- he did his next injection with eating pasta and did ok with it. He has some SIMONS with working out and at times with wearing a mask. He denies any coughing, wheezing, or SOB at rest. He has a little nasal congestion in the morning, but otherwise denies any respiratory symptoms. His GERD is under good control of omeprazole. He gets intermittent chest pains and has for several years. For some reason he has noticed it is worse in Feb/March and a few years ago he had a heart cath for similar symptoms. At that time he was told everything looked ok. He describes the pain as a twinge.      Previous pulmonary history: He has no history of recurrent infections, or lung disease as a child. He had no previous lung hx, never on oxygen or inhaler therapy.      Inhalers/nebulized medications: none      Hospitalization History: He has not been hospitalized over the last year for breathing related problem.     Sleep history: He does not snore often. He never wakes up rested -- he doesn't sleep well. He dozes off often in the evening.        ALLERGIES AND MEDICATIONS     ALLERGIES  No Known Allergies    MEDICATIONS  Current Outpatient Medications   Medication Sig Dispense Refill    albuterol (Ventolin HFA) 90 mcg/actuation inhaler Inhale 2 puffs every 4 hours if needed for wheezing or shortness of breath. 8 g 5    aspirin 81 mg EC tablet Take 1 tablet (81 mg) by mouth once daily.      atorvastatin (Lipitor) 80 mg tablet Take 1 tablet (80 mg) by mouth once daily at bedtime. 90 tablet 3    blood sugar diagnostic (Blood Glucose Test) strip Test blood sugar two times daily 200 strip 3    budesonide-formoteroL (Symbicort) 160-4.5 mcg/actuation inhaler Inhale 2 puffs 2 times a day. Rinse mouth with water after use to reduce aftertaste and incidence of candidiasis. Do not swallow. 10.2 g 3    cholecalciferol (Vitamin D-3) 50  MCG (2000 UT) tablet Take 1 tablet (2,000 Units) by mouth once daily. 90 tablet 3    clopidogrel (Plavix) 75 mg tablet Take 1 tablet (75 mg) by mouth once daily. 90 tablet 3    cyanocobalamin (Vitamin B-12) 250 mcg tablet Take 1 tablet (250 mcg) by mouth once daily.      ezetimibe (Zetia) 10 mg tablet Take 1 tablet (10 mg) by mouth once daily at bedtime. 90 tablet 3    furosemide (Lasix) 20 mg tablet Take 2 tablets (40 mg) by mouth once daily. 180 tablet 3    glipiZIDE (Glucotrol) 5 mg tablet Take 1 tablet (5 mg) by mouth 2 times a day before meals. 60 tablet 6    isosorbide mononitrate ER (Imdur) 60 mg 24 hr tablet Take 1 tablet (60 mg) by mouth once daily at bedtime. Do not crush or chew. 90 tablet 3    Jardiance 25 mg Take 1 tablet (25 mg) by mouth once daily. 30 tablet 11    lisinopril 20 mg tablet TAKE ONE TABLET BY MOUTH EVERY DAY 90 tablet 3    metFORMIN (Glucophage) 500 mg tablet TAKE TWO TABLETS BY MOUTH TWO TIMES A DAY WITH MEALS 360 tablet 3    metoprolol succinate XL (Toprol-XL) 25 mg 24 hr tablet Take 1 tablet (25 mg) by mouth once daily. Do not crush or chew. 90 tablet 3    pantoprazole (ProtoNix) 20 mg EC tablet Take 1 tablet (20 mg) by mouth once daily in the morning. Take before meals. Do not crush, chew, or split. 90 tablet 3     No current facility-administered medications for this visit.         PAST HISTORY     PAST MEDICAL HISTORY  - HTN   - CAD s/p CABG - 2017                                                                                 - HLD   - DMII                                              - GERD          - pancreatitis      - ? thyroid issues                              PAST SURGICAL HISTORY  Past Surgical History:   Procedure Laterality Date    CARDIAC CATHETERIZATION  03/2020    CARDIAC CATHETERIZATION N/A 10/21/2024    Procedure: Left Heart Cath, No LV, With Grafts;  Surgeon: Shine Schmidt MD;  Location: [facility] Cardiac Cath Lab;  Service: Cardiovascular;  Laterality: N/A;     CARDIAC SURGERY  2017    CORONARY ARTERY BYPASS GRAFT  01/16/2018    CABG    GALLBLADDER SURGERY  06/2023    OTHER SURGICAL HISTORY  12/08/2022    Cataract surgery       IMMUNIZATION HISTORY  Immunization History   Administered Date(s) Administered    COVID-19, mRNA, LNP-S, PF, 30 mcg/0.3 mL dose 03/17/2021, 04/14/2021, 10/26/2021    Flu vaccine (IIV4), preservative free *Check age/dose* 09/25/2018, 09/15/2021    Flu vaccine, quadrivalent, no egg protein, age 6 month or greater (FLUCELVAX) 09/23/2020, 10/07/2023    Flu vaccine, trivalent, preservative free, age 6 months and greater (Fluarix/Fluzone/Flulaval) 10/03/2016, 10/10/2020, 12/18/2024    Influenza, Unspecified 10/10/2020, 09/03/2022    Influenza, injectable, quadrivalent 09/01/2019, 09/25/2019    Influenza, injectable, quadrivalent, preservative free, pediatric 09/20/2022    Influenza, trivalent, adjuvanted 09/12/2017    Pfizer COVID-19 vaccine, 12 years and older, (30mcg/0.3mL) (Comirnaty) 10/07/2023    Pneumococcal conjugate vaccine, 20-valent (PREVNAR 20) 12/18/2024    Tdap vaccine, age 7 year and older (BOOSTRIX, ADACEL) 06/23/2012, 02/13/2024    Zoster vaccine, recombinant, adult (SHINGRIX) 12/08/2021, 12/08/2022    Zoster, Unspecified 12/08/2021, 12/08/2022       SOCIAL HISTORY  smoking: significant 2nd hand smoke exposure from parents   drinking: none  illicit drug use: none     OCCUPATIONAL/ENVIRONMENTAL HISTORY  Occupation: Currently works in maintenance - machining, welding, insulation      FAMILY HISTORY  GENESIS - mother and two brothers   Lung cancer - father - surgery for removal, grand father, and uncle   COPD - Father     RESULTS/DATA     Pulmonary Function Test Results     11/21/24 - FEV1/FVC 0.89/ FEV1 2.06 (59% + BD resp)/ FVC 2.30 (52% + BD resp)/ TLC 4.30 (66%)/ DLCO 40 -> 96 corrected for volume     Chest Radiograph     XR chest 1 view   Impression  No acute finding.    [id number]      Chest CT Scan     CT chest:   - 5/9/22 - Stable size  but with slightly reduced attenuation of a peribronchial focal ground-glass opacity within right upper lobe, measuring approximately 11 mm in average diameter. Although, the findings likely favor underlying inflammatory etiology, attention on follow-up CT scan chest within 6-9 months is recommended to assess stability/document resolution. No definite airspace consolidation, pleural effusion or pneumothorax. Esophagus is mildly patulous throughout its course and correlate with concern for gastroesophageal reflux versus motility disorder. Severe coronary artery calcifications, status post CABG. Redemonstrated layering hyperdensity within the gallbladder,  likely representing gallbladder sludge versus stones. No CT evidence of acute cholecystitis.  - 12/12/22 Similar appearance of a right upper lobe ground-glass nodule as compared to 05/09/2022 and appears less conspicuous as compared to 02/02/2022.Pulmonary nodules detected on CT images. Nonspecific 4 mm ground-glass nodule the left lower lobe. No further follow-up is required, however, if the nodule morphology is suspicious, consider follow up at 2 and 4 years; if grows or increasingly solid, consider resection. Chronic findings as above.    ----------------------------------     [facility] No CT signs of pulmonary embolism, aortic aneurysm or aortic dissection Small right upper lobe infiltrate. Also there is a 3 mm left lower lobe noncalcified nodule. Follow-up of the infiltrate to complete resolution is needed to exclude underlying neoplasm             . Hyperdense material in the gallbladder either sludge or gallstones. Postthoracotomy changes  -----------  12/13/23 - IMPRESSION:  Mild patchy ground-glass opacities in the right upper lobe, some of  which are nodular appearance, progressed when compared to the  previous study as evidenced by an increase in their size and number.  New similar appearing mild patchy ground-glass opacities in  the  lingula.  Interval resolution of the 4 mm ground-glass nodule within  the left lower lobe. Findings are nonspecific and may relate to an  infectious or inflammatory process but will require continued  surveillance.    - 4/24/24 - Improvement of right upper lobe airspace opacities presumed  inflammatory. Some residual focal bronchiectasis detected in the  right upper lobe not significantly changed.  2. New area of subtle ground-glass opacity seen in the left upper  lobe as well as a ground-glass nodule also new. These are highly  likely inflammatory. Conservative management would be a follow-up in  12 months  3. Median sternotomy. Heart size is enlarged. Hepatic steatosis.  Vascular calcification.  4. Small sliding hiatal hernia.      10/20/24: No CT signs of pulmonary embolism.  2. Diffuse ground-glass infiltrates suspicious for pulmonary edema.  Correlate clinically. Suspected signs of CHF. Small bilateral pleural  effusions    2/3/25 - Interval resolution of diffuse ground-glass densities throughout  the lungs bilaterally.  2. Several small ground-glass density nodules in the right upper  lobe, not clearly seen on the prior study. Recommendation is for  follow-up examination in 3-6 months with CT of the chest.    Echocardiogram        Echo: 1/19/18 - EF 50-55% . Abnormal septal motion consistent with post-operative status. Spectral Doppler shows an impaired relaxation pattern of left ventricular diastolic filling. There is mild mitral and tricuspid regurgitation.    Echo: 10/21/24 -  The left ventricular systolic function is moderately decreased, with a visually estimated ejection fraction of 35-40%.   2. There is global hypokinesis of the left ventricle with minor regional variations.   3. Spectral Doppler shows an abnormal pattern of left ventricular diastolic filling.  RA normal size - RV not well visualized         [facility] - 10/21/24 - Left main: 100% distal stenosis.   2. RCA: 80% diffuse prox-mid disease.    3. 4/4 patent grafts: (1) LIMA - LAD (2) sequential SVG - OM2 - D1 (3) SVG -rPDA.   4. LVEDP 19mmHg, no aortic stenosis on LV-Ao gradient.     Labs/ Other testing        REVIEW OF SYSTEMS     REVIEW OF SYSTEMS  Review of Systems   Constitutional:  Negative for fatigue and fever.   HENT:  Negative for congestion, postnasal drip, rhinorrhea, sinus pressure and voice change.    Eyes:  Negative for pain and visual disturbance.   Cardiovascular:  Negative for chest pain, palpitations and leg swelling.   Gastrointestinal:  Negative for abdominal pain, diarrhea, nausea and vomiting.   Endocrine: Negative for cold intolerance and heat intolerance.   Musculoskeletal:  Negative for arthralgias, back pain, joint swelling and myalgias.   Skin:  Negative for rash.   Neurological:  Negative for dizziness, weakness, numbness and headaches.   Psychiatric/Behavioral:  Negative for agitation. The patient is not nervous/anxious.          PHYSICAL EXAM     VITAL SIGNS: /74 (BP Location: Left arm, Patient Position: Sitting, BP Cuff Size: Adult)   Pulse 91   Temp 36.3 °C (97.3 °F) (Temporal)   Resp 20   Wt 103 kg (227 lb 4.7 oz)   SpO2 98%   BMI 35.60 kg/m²      CURRENT WEIGHT: [unfilled]  BMI: [unfilled]  PREVIOUS WEIGHTS:  Wt Readings from Last 3 Encounters:   03/12/25 103 kg (227 lb 4.7 oz)   01/15/25 108 kg (239 lb)   12/18/24 104 kg (230 lb 3.2 oz)       Physical Exam  Vitals reviewed.   Constitutional:       General: He is not in acute distress.     Appearance: Normal appearance. He is obese. He is not ill-appearing or toxic-appearing.   HENT:      Head: Normocephalic.      Nose: No rhinorrhea.   Cardiovascular:      Rate and Rhythm: Normal rate and regular rhythm.      Heart sounds: Normal heart sounds.   Pulmonary:      Effort: Pulmonary effort is normal. No respiratory distress.      Breath sounds: Normal breath sounds. No stridor. No wheezing, rhonchi or rales.   Abdominal:      General: Abdomen is flat.    Musculoskeletal:         General: Normal range of motion.      Right lower leg: Edema present.      Left lower leg: Edema present.      Comments: Trace BLE edema    Skin:     General: Skin is warm and dry.      Nails: There is no clubbing.   Neurological:      General: No focal deficit present.      Mental Status: He is alert and oriented to person, place, and time.   Psychiatric:         Mood and Affect: Mood normal.         Behavior: Behavior normal.         Judgment: Judgment normal.       ASSESSMENT/PLAN       1. Abnormal CT chest: small area of infiltrate and small nodule-- exam stable. New areas of ground glass/ inflammation - no infectious symptoms at that time. CT 10/2024 during NSTEMI with GGO - likely related to edema. 2/2025 CT chest with resolution of GG  - will get CT chest in 6 months     2. Concern for GENESIS: excessive daytime sleepiness   - referral to sleep medicine at last visit -- declined     3. SIMONS: recent NSTEMI with reduced EF. Started on meds/ lasix by cardiology. PFTs without obstruction - restricted with BD resp.   - continue symbicort 160 2 puffs twice daily - rinse mouth out afterwards   - continue albuterol 2 puffs every 4-6 hours as needed   - continue to follow with cardiology   - discussed reducing diet mountain dew intake - drinking 60oz of diet mountain dew a day       Thank you for visiting the Pulmonary clinic today!   Return to clinic 6 months after CT chest or sooner if needed   Jazlyn Blas CNP  My office -  [phone]- [place] is my . Ambika is my nurse [phone].   Radiology scheduling [phone]   Appointment scheduling [phone]   Pulmonary function testing - [phone]

## 2025-03-12 NOTE — PATIENT INSTRUCTIONS
1. Abnormal CT chest: small area of infiltrate and small nodule-- exam stable. New areas of ground glass/ inflammation - no infectious symptoms at that time. CT 10/2024 during NSTEMI with GGO - likely related to edema. 2/2025 CT chest with resolution of GG  - will get CT chest in 6 months     2. Concern for GENESIS: excessive daytime sleepiness   - referral to sleep medicine at last visit -- declined     3. SIMONS: recent NSTEMI with reduced EF. Started on meds/ lasix by cardiology. PFTs without obstruction - restricted with BD resp.   - continue symbicort 160 2 puffs twice daily - rinse mouth out afterwards   - continue albuterol 2 puffs every 4-6 hours as needed   - continue to follow with cardiology   - discussed reducing diet mountain dew intake - drinking 60oz of diet mountain dew a day       Thank you for visiting the Pulmonary clinic today!   Return to clinic 6 months after CT chest or sooner if needed   Jazlyn Blas CNP  My office -  [phone]- [place] is my . Ambika is my nurse [phone].   Radiology scheduling [phone]   Appointment scheduling [phone]   Pulmonary function testing - [phone]

## 2025-03-17 DIAGNOSIS — I10 ESSENTIAL HYPERTENSION, BENIGN: ICD-10-CM

## 2025-03-18 RX ORDER — LISINOPRIL 20 MG/1
20 TABLET ORAL DAILY
Qty: 90 TABLET | Refills: 3 | Status: SHIPPED | OUTPATIENT
Start: 2025-03-18 | End: 2026-03-18

## 2025-03-26 ENCOUNTER — APPOINTMENT (OUTPATIENT)
Dept: PRIMARY CARE | Facility: CLINIC | Age: 55
End: 2025-03-26
Payer: COMMERCIAL

## 2025-03-26 ENCOUNTER — OFFICE VISIT (OUTPATIENT)
Dept: CARDIOLOGY | Facility: HOSPITAL | Age: 55
End: 2025-03-26
Payer: COMMERCIAL

## 2025-03-26 VITALS
WEIGHT: 228 LBS | HEIGHT: 67 IN | OXYGEN SATURATION: 94 % | SYSTOLIC BLOOD PRESSURE: 133 MMHG | HEART RATE: 85 BPM | DIASTOLIC BLOOD PRESSURE: 82 MMHG | BODY MASS INDEX: 35.79 KG/M2

## 2025-03-26 VITALS
SYSTOLIC BLOOD PRESSURE: 159 MMHG | WEIGHT: 230.16 LBS | OXYGEN SATURATION: 98 % | HEART RATE: 93 BPM | DIASTOLIC BLOOD PRESSURE: 88 MMHG | BODY MASS INDEX: 36.05 KG/M2

## 2025-03-26 DIAGNOSIS — I10 ESSENTIAL HYPERTENSION, BENIGN: Primary | ICD-10-CM

## 2025-03-26 DIAGNOSIS — R06.02 SHORTNESS OF BREATH: ICD-10-CM

## 2025-03-26 DIAGNOSIS — I42.8 OTHER CARDIOMYOPATHY: ICD-10-CM

## 2025-03-26 DIAGNOSIS — Z11.59 ENCOUNTER FOR HEPATITIS C SCREENING TEST FOR LOW RISK PATIENT: ICD-10-CM

## 2025-03-26 DIAGNOSIS — I21.4 NSTEMI (NON-ST ELEVATED MYOCARDIAL INFARCTION) (MULTI): ICD-10-CM

## 2025-03-26 DIAGNOSIS — Z11.4 SCREENING FOR HIV WITHOUT PRESENCE OF RISK FACTORS: ICD-10-CM

## 2025-03-26 DIAGNOSIS — R00.2 HEART PALPITATIONS: ICD-10-CM

## 2025-03-26 DIAGNOSIS — E55.9 VITAMIN D DEFICIENCY: ICD-10-CM

## 2025-03-26 DIAGNOSIS — E11.42 TYPE 2 DIABETES MELLITUS WITH DIABETIC POLYNEUROPATHY, WITHOUT LONG-TERM CURRENT USE OF INSULIN: ICD-10-CM

## 2025-03-26 DIAGNOSIS — R53.83 TIREDNESS: ICD-10-CM

## 2025-03-26 LAB
ATRIAL RATE: 92 BPM
P AXIS: 35 DEGREES
P OFFSET: 201 MS
P ONSET: 153 MS
PR INTERVAL: 140 MS
Q ONSET: 223 MS
QRS COUNT: 15 BEATS
QRS DURATION: 86 MS
QT INTERVAL: 354 MS
QTC CALCULATION(BAZETT): 437 MS
QTC FREDERICIA: 408 MS
R AXIS: 35 DEGREES
T AXIS: 175 DEGREES
T OFFSET: 400 MS
VENTRICULAR RATE: 92 BPM

## 2025-03-26 PROCEDURE — 3079F DIAST BP 80-89 MM HG: CPT | Performed by: NURSE PRACTITIONER

## 2025-03-26 PROCEDURE — 93005 ELECTROCARDIOGRAM TRACING: CPT | Performed by: NURSE PRACTITIONER

## 2025-03-26 PROCEDURE — 1036F TOBACCO NON-USER: CPT | Performed by: NURSE PRACTITIONER

## 2025-03-26 PROCEDURE — 3077F SYST BP >= 140 MM HG: CPT | Performed by: NURSE PRACTITIONER

## 2025-03-26 PROCEDURE — 3008F BODY MASS INDEX DOCD: CPT | Performed by: NURSE PRACTITIONER

## 2025-03-26 PROCEDURE — 99213 OFFICE O/P EST LOW 20 MIN: CPT | Performed by: NURSE PRACTITIONER

## 2025-03-26 PROCEDURE — 99214 OFFICE O/P EST MOD 30 MIN: CPT | Performed by: NURSE PRACTITIONER

## 2025-03-26 PROCEDURE — 3075F SYST BP GE 130 - 139MM HG: CPT | Performed by: NURSE PRACTITIONER

## 2025-03-26 PROCEDURE — 4010F ACE/ARB THERAPY RXD/TAKEN: CPT | Performed by: NURSE PRACTITIONER

## 2025-03-26 RX ORDER — METOPROLOL SUCCINATE 25 MG/1
25 TABLET, EXTENDED RELEASE ORAL 2 TIMES DAILY
Qty: 180 TABLET | Refills: 3 | Status: ON HOLD | OUTPATIENT
Start: 2025-03-26 | End: 2026-03-26

## 2025-03-26 ASSESSMENT — ENCOUNTER SYMPTOMS
HEMATOLOGIC/LYMPHATIC NEGATIVE: 1
GASTROINTESTINAL NEGATIVE: 1
EYES NEGATIVE: 1
CARDIOVASCULAR NEGATIVE: 1
RESPIRATORY NEGATIVE: 1
PSYCHIATRIC NEGATIVE: 1
ENDOCRINE NEGATIVE: 1
MYALGIAS: 1
NEUROLOGICAL NEGATIVE: 1
CONSTITUTIONAL NEGATIVE: 1

## 2025-03-26 ASSESSMENT — PATIENT HEALTH QUESTIONNAIRE - PHQ9
2. FEELING DOWN, DEPRESSED OR HOPELESS: NOT AT ALL
1. LITTLE INTEREST OR PLEASURE IN DOING THINGS: NOT AT ALL
SUM OF ALL RESPONSES TO PHQ9 QUESTIONS 1 AND 2: 0

## 2025-03-26 NOTE — ASSESSMENT & PLAN NOTE
Continue Vitamin D3 daily   Orders:    Vitamin D 25-Hydroxy,Total (for eval of Vitamin D levels); Future

## 2025-03-26 NOTE — PATIENT INSTRUCTIONS
CALL WITH ANY QUESTIONS   INCREASE THE METOPROLOL TO 25 MG TWICE A DAY   ECHOCARDIOGRAM   HEART MONITOR FOR TWO WEEKS   FOLLOW UP IN TWO MONTHS

## 2025-03-26 NOTE — PROGRESS NOTES
"Subjective   Chief Complaint   Patient presents with    Follow-up     Arnaldo Balderrama is a 54 y.o. male is here today for a follow up. Pulmonologist would like blood work done. They are interested in potassium levels.          Patient ID: Arnaldo Balderrama is a 54 y.o. male who presents for Follow-up (Arnaldo Balderrama is a 54 y.o. male is here today for a follow up. Pulmonologist would like blood work done. They are interested in potassium levels. /).      HPI  Arnaldo  is a 55 yo male presenting today for 3 month f/u   LOV: 12/18/2024    Dx: CAD s/p CABG X 4 (2017), HTN, HLD,T2DM, GERD, NSTEMI (2024)     Current Providers  Specialists: I have reviewed specialist-related care of the patient in the medical record.   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 Demetria Hicks; LOV JAN 2025   Rx: Glipizide, Jardiance, Metformin  7.2%  Dec 2023--->8.3% Oct 2024---> 7.4% Jan 2025   last eye exam: Jan 2024   Neuropathy: b/l feet, does not take medication   Pt was in ER in May for pancreatitis   Rare ETOH use  UACr is due      #HYPOGONADISM   Previously seen by Dr. Barr  started testosterone injections in 2022 for Testosterone= 249   Then pt switched to Dr. Montiel in 2023 and he took him off the Testosterone      #CAD s/p CABG  cardiologist Dr. Schmidt/Juliano; LOV Nov 2024  stable on Plavix, ASA, statin, Isosorbide  Pt states he had an MI in Oct 2024, hospitalized " "at [facility] x 2 days ---> added Lasix, Plavix and Metoprolol, increased statin and isosorbide doses   Heart cath---> 100% stenosis to LEFT MCA, 80% Proximal/Mid RCA       #HLD  Rx Atorvastatin  FLP done Oct 2024     #HYPOTHYROIDISM  Off levothyroxine since Aug 2020  TSH = 2.97 Dec 2023     #GERD  Taking Prilosec OTC  sx controlled     Allergies  No Known Allergies    Review of Systems  ROS was completed and all systems are negative with the exception of what was noted in the the HPI.       Objective     Last Recorded Vitals   /82   Pulse 85   Ht 1.702 m (5' 7\")   Wt 103 kg (228 lb)   SpO2 94%   BMI 35.71 kg/m²      Medications  Current Outpatient Medications   Medication Instructions    albuterol (Ventolin HFA) 90 mcg/actuation inhaler 2 puffs, inhalation, Every 4 hours PRN    aspirin 81 mg EC tablet 1 tablet, Daily    atorvastatin (LIPITOR) 80 mg, oral, Nightly    blood sugar diagnostic (Blood Glucose Test) strip Test blood sugar two times daily    budesonide-formoteroL (Symbicort) 160-4.5 mcg/actuation inhaler 2 puffs, inhalation, 2 times daily, Rinse mouth with water after use to reduce aftertaste and incidence of candidiasis. Do not swallow.    cholecalciferol (VITAMIN D-3) 2,000 Units, oral, Daily    clopidogrel (PLAVIX) 75 mg, oral, Daily    cyanocobalamin (VITAMIN B-12) 250 mcg, Daily    ezetimibe (ZETIA) 10 mg, oral, Nightly    furosemide (LASIX) 40 mg, oral, Daily    glipiZIDE (GLUCOTROL) 5 mg, oral, 2 times daily before meals    isosorbide mononitrate ER (IMDUR) 60 mg, oral, Nightly, Do not crush or chew.    Jardiance 25 mg, oral, Daily    lisinopril 20 mg, oral, Daily    metFORMIN (GLUCOPHAGE) 1,000 mg, oral, 2 times daily (morning and late afternoon)    metoprolol succinate XL (TOPROL-XL) 25 mg, oral, 2 times daily, Do not crush or chew.    pantoprazole (PROTONIX) 20 mg, oral, Daily before breakfast, Do not crush, chew, or split.       Past Surgical History:   Procedure Laterality Date    " CARDIAC CATHETERIZATION  03/2020    CARDIAC CATHETERIZATION N/A 10/21/2024    Procedure: Left Heart Cath, No LV, With Grafts;  Surgeon: Shine Schmidt MD;  Location: [facility];  Service: Cardiovascular;  Laterality: N/A;    CARDIAC SURGERY  2017    CORONARY ARTERY BYPASS GRAFT  01/16/2018    CABG    GALLBLADDER SURGERY  06/2023    OTHER SURGICAL HISTORY  12/08/2022    Cataract surgery       Physical Exam   Physical Exam  Vitals reviewed.   Cardiovascular:      Pulses: Normal pulses.      Heart sounds: Normal heart sounds.   Pulmonary:      Effort: Pulmonary effort is normal.      Breath sounds: Normal breath sounds.   Skin:     General: Skin is warm and dry.   Neurological:      Mental Status: He is alert and oriented to person, place, and time.   Psychiatric:         Mood and Affect: Mood normal.           Assessment & Plan  Essential hypertension, benign  Stable today: 133/82  Continue medications   Orders:    Comprehensive Metabolic Panel; Future    Type 2 diabetes mellitus with diabetic polyneuropathy, without long-term current use of insulin  A1C not due today   Condition stable based on symptoms and exam.    Continue current medications and follow-up at least yearly.  Followed by Endocrinology   Orders:    Albumin-Creatinine Ratio, Urine Random; Future    Vitamin D deficiency  Continue Vitamin D3 daily   Orders:    Vitamin D 25-Hydroxy,Total (for eval of Vitamin D levels); Future    Encounter for hepatitis C screening test for low risk patient    Orders:    Hepatitis C Antibody; Future    Screening for HIV without presence of risk factors    Orders:    HIV 1/2 Antigen/Antibody Screen with Reflex to Confirmation; Future    Tiredness  Check labs   Orders:    TSH with reflex to Free T4 if abnormal; Future    Vitamin B12; Future

## 2025-03-26 NOTE — ASSESSMENT & PLAN NOTE
A1C not due today   Condition stable based on symptoms and exam.    Continue current medications and follow-up at least yearly.  Followed by Endocrinology   Orders:    Albumin-Creatinine Ratio, Urine Random; Future

## 2025-03-26 NOTE — PROGRESS NOTES
Referred by Dr. Haney for No chief complaint on file.     History Of Present Illness:    Arnaldo Balderrama is a very pleasant 54 year old gentleman with a history of CAD s/p CABG x4, DM, HTN and HLD, he is here for a follow up visit. The patient is seen in collaboration with Dr. Schmidt. Mr. Balderrama states walking has shortness of breath on exertion with heart palpitations. Symptoms resolve with rest.  Complains of intermittent chest pain.     Review of Systems   Constitutional: Negative.   HENT: Negative.     Eyes: Negative.    Cardiovascular: Negative.    Respiratory: Negative.     Endocrine: Negative.    Hematologic/Lymphatic: Negative.    Skin: Negative.    Musculoskeletal:  Positive for muscle weakness and myalgias.   Gastrointestinal: Negative.    Neurological: Negative.    Psychiatric/Behavioral: Negative.        Past Medical History:  He has a past medical history of Atherosclerosis of native coronary artery of native heart without angina pectoris, Chronic gout without tophus, unspecified cause, unspecified site, Hyperlipidemia, Hypothyroidism, unspecified (12/02/2020), Other fatigue (12/02/2020), Personal history of other diseases of the musculoskeletal system and connective tissue (09/24/2019), Personal history of other specified conditions (10/09/2018), and Type 2 diabetes mellitus with diabetic polyneuropathy, without long-term current use of insulin.    Past Surgical History:  He has a past surgical history that includes Coronary artery bypass graft (01/16/2018); Other surgical history (12/08/2022); Cardiac surgery (2017); Cardiac catheterization (03/2020); Gallbladder surgery (06/2023); and Cardiac catheterization (N/A, 10/21/2024).      Social History:  He reports that he has never smoked. He has never been exposed to tobacco smoke. He has never used smokeless tobacco. He reports that he does not currently use alcohol. He reports that he does not use drugs.    Family History:  Family History    Problem Relation Name Age of Onset    Thyroid disease Mother      Diabetes Father      Heart disease Father      Thyroid disease Father      Hypertension Sibling          Allergies:  Patient has no known allergies.    Outpatient Medications:  Current Outpatient Medications   Medication Instructions    albuterol (Ventolin HFA) 90 mcg/actuation inhaler 2 puffs, inhalation, Every 4 hours PRN    aspirin 81 mg EC tablet 1 tablet, Daily    atorvastatin (LIPITOR) 80 mg, oral, Nightly    blood sugar diagnostic (Blood Glucose Test) strip Test blood sugar two times daily    budesonide-formoteroL (Symbicort) 160-4.5 mcg/actuation inhaler 2 puffs, inhalation, 2 times daily, Rinse mouth with water after use to reduce aftertaste and incidence of candidiasis. Do not swallow.    cholecalciferol (VITAMIN D-3) 2,000 Units, oral, Daily    clopidogrel (PLAVIX) 75 mg, oral, Daily    cyanocobalamin (VITAMIN B-12) 250 mcg, Daily    ezetimibe (ZETIA) 10 mg, oral, Nightly    furosemide (LASIX) 40 mg, oral, Daily    glipiZIDE (GLUCOTROL) 5 mg, oral, 2 times daily before meals    isosorbide mononitrate ER (IMDUR) 60 mg, oral, Nightly, Do not crush or chew.    Jardiance 25 mg, oral, Daily    lisinopril 20 mg, oral, Daily    metFORMIN (GLUCOPHAGE) 1,000 mg, oral, 2 times daily (morning and late afternoon)    metoprolol succinate XL (TOPROL-XL) 25 mg, oral, 2 times daily, Do not crush or chew.    pantoprazole (PROTONIX) 20 mg, oral, Daily before breakfast, Do not crush, chew, or split.        Last Recorded Vitals:  Vitals:    03/26/25 1346   BP: 159/88   Pulse: 93   SpO2: 98%   Weight: 104 kg (230 lb 2.6 oz)           Physical Exam:  Physical Exam  Vitals reviewed.   HENT:      Head: Normocephalic.      Nose: Nose normal.   Eyes:      Pupils: Pupils are equal, round, and reactive to light.   Cardiovascular:      Rate and Rhythm: Normal rate and regular rhythm.   Pulmonary:      Effort: Pulmonary effort is normal.      Breath sounds: Normal  breath sounds.   Abdominal:      General: Abdomen is flat.      Palpations: Abdomen is soft.   Musculoskeletal:         General: Normal range of motion.      Cervical back: Normal range of motion.   Skin:     General: Skin is warm and dry.   Neurological:      General: No focal deficit present.      Mental Status: He is alert and oriented to person, place, and time.   Psychiatric:         Mood and Affect: Mood normal.            Last Labs:  CBC -  Lab Results   Component Value Date    WBC 8.9 10/21/2024    HGB 15.5 10/21/2024    HCT 46.0 10/21/2024    MCV 85 10/21/2024     10/21/2024       CMP -  Lab Results   Component Value Date    CALCIUM 9.1 10/21/2024    PHOS 4.4 10/21/2024    PROT 6.9 10/20/2024    ALBUMIN 3.9 10/21/2024    AST 22 10/20/2024    ALT 30 10/20/2024    ALKPHOS 60 10/20/2024    BILITOT 0.7 10/20/2024       LIPID PANEL -   Lab Results   Component Value Date    CHOL 117 10/20/2024    TRIG 88 10/20/2024    HDL 31.3 10/20/2024    CHHDL 3.7 10/20/2024    LDLF 94 09/24/2022    VLDL 18 10/20/2024    NHDL 86 10/20/2024       RENAL FUNCTION PANEL -   Lab Results   Component Value Date    GLUCOSE 183 (H) 10/21/2024     10/21/2024    K 4.0 10/21/2024    CL 99 10/21/2024    CO2 29 10/21/2024    ANIONGAP 13 10/21/2024    BUN 12 10/21/2024    CREATININE 0.97 10/21/2024    GFRMALE >90 06/18/2022    CALCIUM 9.1 10/21/2024    PHOS 4.4 10/21/2024    ALBUMIN 3.9 10/21/2024        Lab Results   Component Value Date     (H) 10/20/2024    HGBA1C 7.4 (A) 01/15/2025       Last Cardiology Tests:  ECG:  EKG independently reviewed showed sinus rhythm heart rate 92 bpm     Echo:  Echocardiogram 10/21/2024   1. The left ventricular systolic function is moderately decreased, with a visually estimated ejection fraction of 35-40%.   2. There is global hypokinesis of the left ventricle with minor regional variations.   3. Spectral Doppler shows an abnormal pattern of left ventricular diastolic  filling.    Echocardiogram 4/7/2022  1. The left ventricular systolic function is normal with a 55-60% estimated ejection fraction.  2. There is trace mitral and tricuspid regurgitation.    Echo 1/19/18:   1. The left ventricular systolic function is low normal with a 50-55% estimated  ejection fraction.   2. Abnormal septal motion consistent with post-operative status.   3. Spectral Doppler shows an impaired relaxation pattern of left ventricular  diastolic filling.   4. There is mild mitral and tricuspid regurgitation.    Echo 12/14/17:   1. The left ventricular systolic function is severely decreased with a 25-30%  estimated ejection fraction.   2. Poorly visualized anatomical structures due to suboptimal image quality.   3. Spectral Doppler shows a restrictive pattern of left ventricular diastolic  filling.   4. RVSP within normal limits.  Ejection Fractions:  LVEF 35-40%    Cath:  Heart cath 10/21/2024  1. Left main: 100% distal stenosis.   2. RCA: 80% diffuse prox-mid disease.   3. 4/4 patent grafts: (1) LIMA - LAD (2) sequential SVG - OM2 - D1 (3) SVG -rPDA.   4. LVEDP 19mmHg, no aortic stenosis on LV-Ao gradient.    Cath 2/21/2020:  1. Left main: 30% distal stenosis.  2. LAD: 95% diffuse prox-mid disease.  3. LCx: 90% diffuse prox-mid disease.  4. Ramus 80% proximal disease.  5. RCA: 80% diffuse prox-mid disease.  6. 4/4 patent grafts: (1) LIMA to LAD (2) sequential SVG to OM2-D1 (3) SVG to  rPDA.  7. LVEDP 9mmHg, no aortic stenosis on LV-Ao gradient.    Stress Test:    Cardiac Imaging:      Assessment/Plan   Very pleasant 54-year-old gentleman with hypertension, diabetes, dyslipidemia, and coronary artery disease status post 4 vessel CABG in December 2017 (LIMA to LAD, SVG to PDA, SVG to OM, SVG to Diag attached proximally to zaman of OM graft). He was admitted 10/2024  with an NSTEMI, , left heart cath showed patent stents. Echo shows an LVEF 35-40%. He complains of shortness of breath and heart  palpitations, notices it more on exertion and resolves with rest. He will have a heart monitor to assess for an arrhythmia and an echocardiogram to reassess LV function. The Metoprolol will be increased to 25 mg daily. Heart rate and blood pressure are well controlled today.      Plan   -call with any questions   -continue Aspirin 81 mg daily indefinitely and Plavix 75 mg daily   -continue Atorvastatin 80 mg daily, Zetia 10 mg daily, Imdur 60 mg daily and Lisinopril 20 mg daily   -increase the Lasix to 40 mg daily   -continue Jardiance 25 mg daily  -increase Metoprolol to 25 mg twice a day   -echocardiogram   -heart monitor for two weeks   -follow up in two months      Jossie Wild, [place]-CNP

## 2025-03-26 NOTE — PATIENT INSTRUCTIONS
Thank you for seeing me today Arnaldo Balderrama, it was a pleasure to see you again!    Continue medications     Lab work ordered today.  Please have your blood drawn in the next 1-2 weeks.  You need to be FASTING for 12 hours prior to blood draw.  You may only have water.  Please contact your insurance company to ask about the best location to get blood drawn.  We will contact you with the results of your blood work and any necessary adjustments  to your plan of care, if you do not hear from us within 3-5 days of having your blood drawn, please call the office at [phone].    For assistance with scheduling referrals or consultations, please call [phone] or [phone].    For laboratory, radiology, and other tests, please call [phone] ([phone] for pediatrics).   For laboratory locations, please visit [email or web address]   If you do not get results within 7-10 days, or you have any questions or concerns, please send a message, call the office ([phone]), or return to the office for a follow-up appointment.     For acute/sick visits, if you are unable to get an office visit, you can do a  On Demand Virtual Visit that is accessible via your My Chart account.  For emergencies, call 9-1-1 or go to the nearest Emergency Department.     Please schedule additional appointment(s) to address concern(s) not addressed today.    Please review prescription inserts and published information for possible adverse effects of all medications.     In general, results are discussed over the phone or via  [email or web address].     You can see your health information, review clinical summaries from office visits & test results online when you follow your health with MY  Chart, a personal health record.   To sign up go to [email or web address].   If you need assistance with signing up or trouble getting into your account call [facility] Patient Line 24/7 at [phone]     RT 6  MONTHS AND AS NEEDED     ~Gavi Manuel, NP

## 2025-03-29 ENCOUNTER — APPOINTMENT (OUTPATIENT)
Dept: CARDIOLOGY | Facility: HOSPITAL | Age: 55
DRG: 280 | End: 2025-03-29
Payer: COMMERCIAL

## 2025-03-29 ENCOUNTER — HOSPITAL ENCOUNTER (INPATIENT)
Facility: HOSPITAL | Age: 55
DRG: 280 | End: 2025-03-29
Attending: STUDENT IN AN ORGANIZED HEALTH CARE EDUCATION/TRAINING PROGRAM | Admitting: INTERNAL MEDICINE
Payer: COMMERCIAL

## 2025-03-29 ENCOUNTER — APPOINTMENT (OUTPATIENT)
Dept: RADIOLOGY | Facility: HOSPITAL | Age: 55
DRG: 280 | End: 2025-03-29
Payer: COMMERCIAL

## 2025-03-29 DIAGNOSIS — R09.02 HYPOXIA: ICD-10-CM

## 2025-03-29 DIAGNOSIS — J81.0 FLASH PULMONARY EDEMA: ICD-10-CM

## 2025-03-29 DIAGNOSIS — R07.9 ACUTE CHEST PAIN: ICD-10-CM

## 2025-03-29 DIAGNOSIS — R06.02 SHORTNESS OF BREATH: ICD-10-CM

## 2025-03-29 DIAGNOSIS — R06.00 ACUTE DYSPNEA: Primary | ICD-10-CM

## 2025-03-29 DIAGNOSIS — I50.42 CHRONIC COMBINED SYSTOLIC AND DIASTOLIC CONGESTIVE HEART FAILURE: ICD-10-CM

## 2025-03-29 DIAGNOSIS — R06.03 RESPIRATORY DISTRESS: ICD-10-CM

## 2025-03-29 DIAGNOSIS — J20.9 ACUTE BRONCHITIS, UNSPECIFIED ORGANISM: ICD-10-CM

## 2025-03-29 DIAGNOSIS — I21.4 NSTEMI (NON-ST ELEVATED MYOCARDIAL INFARCTION) (MULTI): ICD-10-CM

## 2025-03-29 PROBLEM — E11.9 TYPE 2 DIABETES MELLITUS, WITHOUT LONG-TERM CURRENT USE OF INSULIN (MULTI): Status: ACTIVE | Noted: 2025-03-29

## 2025-03-29 PROBLEM — J96.01 ACUTE RESPIRATORY FAILURE WITH HYPOXIA: Status: ACTIVE | Noted: 2025-03-29

## 2025-03-29 PROBLEM — I50.23 ACUTE ON CHRONIC SYSTOLIC CONGESTIVE HEART FAILURE: Status: ACTIVE | Noted: 2025-03-29

## 2025-03-29 LAB
ALBUMIN SERPL BCP-MCNC: 4 G/DL (ref 3.4–5)
ALP SERPL-CCNC: 75 U/L (ref 33–120)
ALT SERPL W P-5'-P-CCNC: 29 U/L (ref 10–52)
ANION GAP BLDA CALCULATED.4IONS-SCNC: 14 MMO/L (ref 10–25)
ANION GAP SERPL CALCULATED.3IONS-SCNC: 13 MMOL/L (ref 10–20)
AORTIC VALVE PEAK VELOCITY: 1.18 M/S
APTT PPP: 24.3 SECONDS (ref 22–32.5)
APTT PPP: 53 SECONDS (ref 22–32.5)
ARTERIAL PATENCY WRIST A: POSITIVE
AST SERPL W P-5'-P-CCNC: 21 U/L (ref 9–39)
AV PEAK GRADIENT: 6 MMHG
AVA (PEAK VEL): 2.15 CM2
BASE EXCESS BLDA CALC-SCNC: -2 MMOL/L (ref -2–3)
BASOPHILS # BLD AUTO: 0.07 X10*3/UL (ref 0–0.1)
BASOPHILS NFR BLD AUTO: 0.6 %
BILIRUB SERPL-MCNC: 0.7 MG/DL (ref 0–1.2)
BNP SERPL-MCNC: 469 PG/ML (ref 0–99)
BODY TEMPERATURE: 37 DEGREES CELSIUS
BUN SERPL-MCNC: 16 MG/DL (ref 6–23)
CA-I BLDA-SCNC: 1.2 MMOL/L (ref 1.1–1.33)
CALCIUM SERPL-MCNC: 8.9 MG/DL (ref 8.6–10.3)
CARDIAC TROPONIN I PNL SERPL HS: 155 NG/L (ref 0–20)
CARDIAC TROPONIN I PNL SERPL HS: 483 NG/L (ref 0–20)
CHLORIDE BLDA-SCNC: 102 MMOL/L (ref 98–107)
CHLORIDE SERPL-SCNC: 104 MMOL/L (ref 98–107)
CO2 SERPL-SCNC: 24 MMOL/L (ref 21–32)
CPAP: 10 CM H2O
CREAT SERPL-MCNC: 0.95 MG/DL (ref 0.5–1.3)
EGFRCR SERPLBLD CKD-EPI 2021: >90 ML/MIN/1.73M*2
EJECTION FRACTION: 43 %
EOSINOPHIL # BLD AUTO: 0.15 X10*3/UL (ref 0–0.7)
EOSINOPHIL NFR BLD AUTO: 1.4 %
ERYTHROCYTE [DISTWIDTH] IN BLOOD BY AUTOMATED COUNT: 13.7 % (ref 11.5–14.5)
GLUCOSE BLD MANUAL STRIP-MCNC: 130 MG/DL (ref 74–99)
GLUCOSE BLD MANUAL STRIP-MCNC: 139 MG/DL (ref 74–99)
GLUCOSE BLD MANUAL STRIP-MCNC: 139 MG/DL (ref 74–99)
GLUCOSE BLD MANUAL STRIP-MCNC: 164 MG/DL (ref 74–99)
GLUCOSE BLD MANUAL STRIP-MCNC: 239 MG/DL (ref 74–99)
GLUCOSE BLDA-MCNC: 166 MG/DL (ref 74–99)
GLUCOSE SERPL-MCNC: 152 MG/DL (ref 74–99)
HCO3 BLDA-SCNC: 22.3 MMOL/L (ref 22–26)
HCT VFR BLD AUTO: 48.1 % (ref 41–52)
HCT VFR BLD EST: 45 % (ref 41–52)
HGB BLD-MCNC: 15.7 G/DL (ref 13.5–17.5)
HGB BLDA-MCNC: 15.1 G/DL (ref 13.5–17.5)
IMM GRANULOCYTES # BLD AUTO: 0.08 X10*3/UL (ref 0–0.7)
IMM GRANULOCYTES NFR BLD AUTO: 0.7 % (ref 0–0.9)
INHALED O2 CONCENTRATION: 50 %
INR PPP: 1.1 (ref 0.9–1.2)
LACTATE BLDA-SCNC: 1.7 MMOL/L (ref 0.4–2)
LEFT VENTRICLE INTERNAL DIMENSION DIASTOLE: 4.84 CM (ref 3.5–6)
LEFT VENTRICULAR OUTFLOW TRACT DIAMETER: 1.8 CM
LYMPHOCYTES # BLD AUTO: 2.85 X10*3/UL (ref 1.2–4.8)
LYMPHOCYTES NFR BLD AUTO: 26.1 %
MAGNESIUM SERPL-MCNC: 1.23 MG/DL (ref 1.6–2.4)
MCH RBC QN AUTO: 28 PG (ref 26–34)
MCHC RBC AUTO-ENTMCNC: 32.6 G/DL (ref 32–36)
MCV RBC AUTO: 86 FL (ref 80–100)
MONOCYTES # BLD AUTO: 0.83 X10*3/UL (ref 0.1–1)
MONOCYTES NFR BLD AUTO: 7.6 %
NEUTROPHILS # BLD AUTO: 6.95 X10*3/UL (ref 1.2–7.7)
NEUTROPHILS NFR BLD AUTO: 63.6 %
NRBC BLD-RTO: 0 /100 WBCS (ref 0–0)
OXYHGB MFR BLDA: 96.8 % (ref 94–98)
PCO2 BLDA: 36 MM HG (ref 38–42)
PH BLDA: 7.4 PH (ref 7.38–7.42)
PLATELET # BLD AUTO: 250 X10*3/UL (ref 150–450)
PO2 BLDA: 139 MM HG (ref 85–95)
POTASSIUM BLDA-SCNC: 3.7 MMOL/L (ref 3.5–5.3)
POTASSIUM SERPL-SCNC: 3.8 MMOL/L (ref 3.5–5.3)
PROT SERPL-MCNC: 7 G/DL (ref 6.4–8.2)
PROTHROMBIN TIME: 11.5 SECONDS (ref 9.3–12.7)
RBC # BLD AUTO: 5.6 X10*6/UL (ref 4.5–5.9)
RIGHT VENTRICLE FREE WALL PEAK S': 5.98 CM/S
RIGHT VENTRICLE PEAK SYSTOLIC PRESSURE: 18.2 MMHG
SAO2 % BLDA: 99 % (ref 94–100)
SODIUM BLDA-SCNC: 135 MMOL/L (ref 136–145)
SODIUM SERPL-SCNC: 137 MMOL/L (ref 136–145)
SPECIMEN DRAWN FROM PATIENT: ABNORMAL
VENTILATOR MODE: ABNORMAL
WBC # BLD AUTO: 10.9 X10*3/UL (ref 4.4–11.3)

## 2025-03-29 PROCEDURE — 84484 ASSAY OF TROPONIN QUANT: CPT | Performed by: STUDENT IN AN ORGANIZED HEALTH CARE EDUCATION/TRAINING PROGRAM

## 2025-03-29 PROCEDURE — 36600 WITHDRAWAL OF ARTERIAL BLOOD: CPT

## 2025-03-29 PROCEDURE — 2500000001 HC RX 250 WO HCPCS SELF ADMINISTERED DRUGS (ALT 637 FOR MEDICARE OP): Performed by: STUDENT IN AN ORGANIZED HEALTH CARE EDUCATION/TRAINING PROGRAM

## 2025-03-29 PROCEDURE — 83880 ASSAY OF NATRIURETIC PEPTIDE: CPT | Performed by: STUDENT IN AN ORGANIZED HEALTH CARE EDUCATION/TRAINING PROGRAM

## 2025-03-29 PROCEDURE — 2500000002 HC RX 250 W HCPCS SELF ADMINISTERED DRUGS (ALT 637 FOR MEDICARE OP, ALT 636 FOR OP/ED): Performed by: INTERNAL MEDICINE

## 2025-03-29 PROCEDURE — 2500000004 HC RX 250 GENERAL PHARMACY W/ HCPCS (ALT 636 FOR OP/ED): Performed by: STUDENT IN AN ORGANIZED HEALTH CARE EDUCATION/TRAINING PROGRAM

## 2025-03-29 PROCEDURE — 85025 COMPLETE CBC W/AUTO DIFF WBC: CPT | Performed by: STUDENT IN AN ORGANIZED HEALTH CARE EDUCATION/TRAINING PROGRAM

## 2025-03-29 PROCEDURE — 9420000001 HC RT PATIENT EDUCATION 5 MIN

## 2025-03-29 PROCEDURE — 5A09357 ASSISTANCE WITH RESPIRATORY VENTILATION, LESS THAN 24 CONSECUTIVE HOURS, CONTINUOUS POSITIVE AIRWAY PRESSURE: ICD-10-PCS | Performed by: INTERNAL MEDICINE

## 2025-03-29 PROCEDURE — 93005 ELECTROCARDIOGRAM TRACING: CPT

## 2025-03-29 PROCEDURE — 96366 THER/PROPH/DIAG IV INF ADDON: CPT

## 2025-03-29 PROCEDURE — 94664 DEMO&/EVAL PT USE INHALER: CPT

## 2025-03-29 PROCEDURE — 82947 ASSAY GLUCOSE BLOOD QUANT: CPT

## 2025-03-29 PROCEDURE — 2060000001 HC INTERMEDIATE ICU ROOM DAILY

## 2025-03-29 PROCEDURE — 71045 X-RAY EXAM CHEST 1 VIEW: CPT | Mod: FOREIGN READ | Performed by: RADIOLOGY

## 2025-03-29 PROCEDURE — C8924 2D TTE W OR W/O FOL W/CON,FU: HCPCS

## 2025-03-29 PROCEDURE — 96365 THER/PROPH/DIAG IV INF INIT: CPT

## 2025-03-29 PROCEDURE — 36415 COLL VENOUS BLD VENIPUNCTURE: CPT | Performed by: STUDENT IN AN ORGANIZED HEALTH CARE EDUCATION/TRAINING PROGRAM

## 2025-03-29 PROCEDURE — 2500000001 HC RX 250 WO HCPCS SELF ADMINISTERED DRUGS (ALT 637 FOR MEDICARE OP): Performed by: INTERNAL MEDICINE

## 2025-03-29 PROCEDURE — 2500000002 HC RX 250 W HCPCS SELF ADMINISTERED DRUGS (ALT 637 FOR MEDICARE OP, ALT 636 FOR OP/ED): Performed by: FAMILY MEDICINE

## 2025-03-29 PROCEDURE — 84132 ASSAY OF SERUM POTASSIUM: CPT | Performed by: STUDENT IN AN ORGANIZED HEALTH CARE EDUCATION/TRAINING PROGRAM

## 2025-03-29 PROCEDURE — 94762 N-INVAS EAR/PLS OXIMTRY CONT: CPT

## 2025-03-29 PROCEDURE — 99291 CRITICAL CARE FIRST HOUR: CPT | Mod: 25 | Performed by: STUDENT IN AN ORGANIZED HEALTH CARE EDUCATION/TRAINING PROGRAM

## 2025-03-29 PROCEDURE — 85610 PROTHROMBIN TIME: CPT | Performed by: INTERNAL MEDICINE

## 2025-03-29 PROCEDURE — 93325 DOPPLER ECHO COLOR FLOW MAPG: CPT

## 2025-03-29 PROCEDURE — 99223 1ST HOSP IP/OBS HIGH 75: CPT | Performed by: INTERNAL MEDICINE

## 2025-03-29 PROCEDURE — 2500000004 HC RX 250 GENERAL PHARMACY W/ HCPCS (ALT 636 FOR OP/ED): Performed by: NURSE PRACTITIONER

## 2025-03-29 PROCEDURE — 93321 DOPPLER ECHO F-UP/LMTD STD: CPT | Performed by: INTERNAL MEDICINE

## 2025-03-29 PROCEDURE — 85730 THROMBOPLASTIN TIME PARTIAL: CPT | Performed by: STUDENT IN AN ORGANIZED HEALTH CARE EDUCATION/TRAINING PROGRAM

## 2025-03-29 PROCEDURE — 99233 SBSQ HOSP IP/OBS HIGH 50: CPT | Performed by: FAMILY MEDICINE

## 2025-03-29 PROCEDURE — 2500000005 HC RX 250 GENERAL PHARMACY W/O HCPCS: Performed by: INTERNAL MEDICINE

## 2025-03-29 PROCEDURE — 83735 ASSAY OF MAGNESIUM: CPT | Performed by: STUDENT IN AN ORGANIZED HEALTH CARE EDUCATION/TRAINING PROGRAM

## 2025-03-29 PROCEDURE — 94640 AIRWAY INHALATION TREATMENT: CPT

## 2025-03-29 PROCEDURE — 93308 TTE F-UP OR LMTD: CPT | Performed by: INTERNAL MEDICINE

## 2025-03-29 PROCEDURE — 94660 CPAP INITIATION&MGMT: CPT

## 2025-03-29 PROCEDURE — 93325 DOPPLER ECHO COLOR FLOW MAPG: CPT | Performed by: INTERNAL MEDICINE

## 2025-03-29 PROCEDURE — 2500000004 HC RX 250 GENERAL PHARMACY W/ HCPCS (ALT 636 FOR OP/ED)

## 2025-03-29 PROCEDURE — 71045 X-RAY EXAM CHEST 1 VIEW: CPT

## 2025-03-29 PROCEDURE — 93010 ELECTROCARDIOGRAM REPORT: CPT | Performed by: INTERNAL MEDICINE

## 2025-03-29 PROCEDURE — 2500000005 HC RX 250 GENERAL PHARMACY W/O HCPCS: Performed by: STUDENT IN AN ORGANIZED HEALTH CARE EDUCATION/TRAINING PROGRAM

## 2025-03-29 PROCEDURE — 96375 TX/PRO/DX INJ NEW DRUG ADDON: CPT

## 2025-03-29 PROCEDURE — 99223 1ST HOSP IP/OBS HIGH 75: CPT

## 2025-03-29 RX ORDER — HEPARIN SODIUM 5000 [USP'U]/ML
2000-4000 INJECTION, SOLUTION INTRAVENOUS; SUBCUTANEOUS AS NEEDED
Status: DISCONTINUED | OUTPATIENT
Start: 2025-03-29 | End: 2025-03-29

## 2025-03-29 RX ORDER — NITROGLYCERIN 0.4 MG/1
0.4 TABLET SUBLINGUAL ONCE
Status: COMPLETED | OUTPATIENT
Start: 2025-03-29 | End: 2025-03-29

## 2025-03-29 RX ORDER — FLUTICASONE FUROATE AND VILANTEROL 200; 25 UG/1; UG/1
1 POWDER RESPIRATORY (INHALATION)
Status: DISCONTINUED | OUTPATIENT
Start: 2025-03-29 | End: 2025-03-31 | Stop reason: HOSPADM

## 2025-03-29 RX ORDER — HEPARIN SODIUM 10000 [USP'U]/100ML
0-4000 INJECTION, SOLUTION INTRAVENOUS CONTINUOUS
Status: DISCONTINUED | OUTPATIENT
Start: 2025-03-29 | End: 2025-03-29

## 2025-03-29 RX ORDER — CLOPIDOGREL BISULFATE 75 MG/1
75 TABLET ORAL DAILY
Status: DISCONTINUED | OUTPATIENT
Start: 2025-03-29 | End: 2025-03-31 | Stop reason: HOSPADM

## 2025-03-29 RX ORDER — FUROSEMIDE 10 MG/ML
40 INJECTION INTRAMUSCULAR; INTRAVENOUS DAILY
Status: DISCONTINUED | OUTPATIENT
Start: 2025-03-29 | End: 2025-03-29

## 2025-03-29 RX ORDER — FUROSEMIDE 10 MG/ML
40 INJECTION INTRAMUSCULAR; INTRAVENOUS
Status: DISCONTINUED | OUTPATIENT
Start: 2025-03-29 | End: 2025-03-30

## 2025-03-29 RX ORDER — FUROSEMIDE 10 MG/ML
40 INJECTION INTRAMUSCULAR; INTRAVENOUS ONCE
Status: DISCONTINUED | OUTPATIENT
Start: 2025-03-29 | End: 2025-03-29

## 2025-03-29 RX ORDER — VIT C/E/ZN/COPPR/LUTEIN/ZEAXAN 250MG-90MG
250 CAPSULE ORAL DAILY
Status: DISCONTINUED | OUTPATIENT
Start: 2025-03-29 | End: 2025-03-31 | Stop reason: HOSPADM

## 2025-03-29 RX ORDER — METOPROLOL SUCCINATE 25 MG/1
25 TABLET, EXTENDED RELEASE ORAL 2 TIMES DAILY
Status: DISCONTINUED | OUTPATIENT
Start: 2025-03-29 | End: 2025-03-31 | Stop reason: HOSPADM

## 2025-03-29 RX ORDER — NITROGLYCERIN 20 MG/100ML
0-200 INJECTION INTRAVENOUS CONTINUOUS
Status: DISCONTINUED | OUTPATIENT
Start: 2025-03-29 | End: 2025-03-29

## 2025-03-29 RX ORDER — POLYETHYLENE GLYCOL 3350 17 G/17G
17 POWDER, FOR SOLUTION ORAL DAILY
Status: DISCONTINUED | OUTPATIENT
Start: 2025-03-29 | End: 2025-03-31 | Stop reason: HOSPADM

## 2025-03-29 RX ORDER — IPRATROPIUM BROMIDE AND ALBUTEROL SULFATE 2.5; .5 MG/3ML; MG/3ML
3 SOLUTION RESPIRATORY (INHALATION) EVERY 2 HOUR PRN
Status: DISCONTINUED | OUTPATIENT
Start: 2025-03-29 | End: 2025-03-30

## 2025-03-29 RX ORDER — ACETAMINOPHEN 650 MG/1
650 SUPPOSITORY RECTAL EVERY 4 HOURS PRN
Status: DISCONTINUED | OUTPATIENT
Start: 2025-03-29 | End: 2025-03-31 | Stop reason: HOSPADM

## 2025-03-29 RX ORDER — ACETAMINOPHEN 325 MG/1
650 TABLET ORAL EVERY 4 HOURS PRN
Status: DISCONTINUED | OUTPATIENT
Start: 2025-03-29 | End: 2025-03-31 | Stop reason: HOSPADM

## 2025-03-29 RX ORDER — INSULIN LISPRO 100 [IU]/ML
0-10 INJECTION, SOLUTION INTRAVENOUS; SUBCUTANEOUS EVERY 4 HOURS
Status: DISCONTINUED | OUTPATIENT
Start: 2025-03-29 | End: 2025-03-31 | Stop reason: HOSPADM

## 2025-03-29 RX ORDER — EZETIMIBE 10 MG/1
10 TABLET ORAL NIGHTLY
Status: DISCONTINUED | OUTPATIENT
Start: 2025-03-29 | End: 2025-03-31 | Stop reason: HOSPADM

## 2025-03-29 RX ORDER — LISINOPRIL 20 MG/1
20 TABLET ORAL DAILY
Status: DISCONTINUED | OUTPATIENT
Start: 2025-03-29 | End: 2025-03-31 | Stop reason: HOSPADM

## 2025-03-29 RX ORDER — HEPARIN SODIUM 5000 [USP'U]/ML
4000 INJECTION, SOLUTION INTRAVENOUS; SUBCUTANEOUS ONCE
Status: COMPLETED | OUTPATIENT
Start: 2025-03-29 | End: 2025-03-29

## 2025-03-29 RX ORDER — MAGNESIUM SULFATE HEPTAHYDRATE 40 MG/ML
2 INJECTION, SOLUTION INTRAVENOUS ONCE
Status: COMPLETED | OUTPATIENT
Start: 2025-03-29 | End: 2025-03-29

## 2025-03-29 RX ORDER — NITROGLYCERIN 0.4 MG/1
0.4 TABLET SUBLINGUAL EVERY 5 MIN PRN
Status: DISCONTINUED | OUTPATIENT
Start: 2025-03-29 | End: 2025-03-31 | Stop reason: HOSPADM

## 2025-03-29 RX ORDER — FUROSEMIDE 10 MG/ML
60 INJECTION INTRAMUSCULAR; INTRAVENOUS ONCE
Status: COMPLETED | OUTPATIENT
Start: 2025-03-29 | End: 2025-03-29

## 2025-03-29 RX ORDER — ASPIRIN 81 MG/1
81 TABLET ORAL DAILY
Status: DISCONTINUED | OUTPATIENT
Start: 2025-03-29 | End: 2025-03-31 | Stop reason: HOSPADM

## 2025-03-29 RX ORDER — ATORVASTATIN CALCIUM 80 MG/1
80 TABLET, FILM COATED ORAL NIGHTLY
Status: DISCONTINUED | OUTPATIENT
Start: 2025-03-29 | End: 2025-03-31 | Stop reason: HOSPADM

## 2025-03-29 RX ORDER — IPRATROPIUM BROMIDE AND ALBUTEROL SULFATE 2.5; .5 MG/3ML; MG/3ML
3 SOLUTION RESPIRATORY (INHALATION) ONCE
Status: COMPLETED | OUTPATIENT
Start: 2025-03-29 | End: 2025-03-29

## 2025-03-29 RX ORDER — ACETAMINOPHEN 160 MG/5ML
650 SOLUTION ORAL EVERY 4 HOURS PRN
Status: DISCONTINUED | OUTPATIENT
Start: 2025-03-29 | End: 2025-03-31 | Stop reason: HOSPADM

## 2025-03-29 RX ORDER — ISOSORBIDE MONONITRATE 60 MG/1
60 TABLET, EXTENDED RELEASE ORAL NIGHTLY
Status: DISCONTINUED | OUTPATIENT
Start: 2025-03-29 | End: 2025-03-31 | Stop reason: HOSPADM

## 2025-03-29 RX ORDER — CHOLECALCIFEROL (VITAMIN D3) 50 MCG
2000 TABLET ORAL DAILY
Status: DISCONTINUED | OUTPATIENT
Start: 2025-03-29 | End: 2025-03-31 | Stop reason: HOSPADM

## 2025-03-29 RX ORDER — PANTOPRAZOLE SODIUM 20 MG/1
20 TABLET, DELAYED RELEASE ORAL
Status: DISCONTINUED | OUTPATIENT
Start: 2025-03-29 | End: 2025-03-31 | Stop reason: HOSPADM

## 2025-03-29 RX ORDER — NAPROXEN SODIUM 220 MG/1
324 TABLET, FILM COATED ORAL ONCE
Status: DISCONTINUED | OUTPATIENT
Start: 2025-03-29 | End: 2025-03-29

## 2025-03-29 RX ADMIN — MAGNESIUM SULFATE IN WATER 2 G: 40 INJECTION, SOLUTION INTRAVENOUS at 03:24

## 2025-03-29 RX ADMIN — ATORVASTATIN CALCIUM 80 MG: 80 TABLET, FILM COATED ORAL at 20:20

## 2025-03-29 RX ADMIN — CLOPIDOGREL 75 MG: 75 TABLET ORAL at 09:55

## 2025-03-29 RX ADMIN — METOPROLOL SUCCINATE 25 MG: 25 TABLET, EXTENDED RELEASE ORAL at 20:20

## 2025-03-29 RX ADMIN — Medication 4 L/MIN: at 08:00

## 2025-03-29 RX ADMIN — EZETIMIBE 10 MG: 10 TABLET ORAL at 20:20

## 2025-03-29 RX ADMIN — CYANOCOBALAMIN TAB 500 MCG 250 MCG: 500 TAB at 08:36

## 2025-03-29 RX ADMIN — ISOSORBIDE MONONITRATE 60 MG: 60 TABLET, EXTENDED RELEASE ORAL at 20:20

## 2025-03-29 RX ADMIN — IPRATROPIUM BROMIDE AND ALBUTEROL SULFATE 3 ML: 2.5; .5 SOLUTION RESPIRATORY (INHALATION) at 21:25

## 2025-03-29 RX ADMIN — PERFLUTREN 10 ML OF DILUTION: 6.52 INJECTION, SUSPENSION INTRAVENOUS at 10:05

## 2025-03-29 RX ADMIN — LISINOPRIL 20 MG: 20 TABLET ORAL at 08:36

## 2025-03-29 RX ADMIN — NITROGLYCERIN 0.4 MG: 0.4 TABLET SUBLINGUAL at 02:31

## 2025-03-29 RX ADMIN — Medication 4 L/MIN: at 19:25

## 2025-03-29 RX ADMIN — Medication 3 L/MIN: at 20:08

## 2025-03-29 RX ADMIN — HEPARIN SODIUM 4000 UNITS: 5000 INJECTION INTRAVENOUS; SUBCUTANEOUS at 04:44

## 2025-03-29 RX ADMIN — INSULIN LISPRO 4 UNITS: 100 INJECTION, SOLUTION INTRAVENOUS; SUBCUTANEOUS at 20:29

## 2025-03-29 RX ADMIN — FUROSEMIDE 40 MG: 10 INJECTION, SOLUTION INTRAMUSCULAR; INTRAVENOUS at 16:40

## 2025-03-29 RX ADMIN — Medication 2000 UNITS: at 08:36

## 2025-03-29 RX ADMIN — IPRATROPIUM BROMIDE AND ALBUTEROL SULFATE 3 ML: 2.5; .5 SOLUTION RESPIRATORY (INHALATION) at 17:49

## 2025-03-29 RX ADMIN — Medication 50 PERCENT: at 02:48

## 2025-03-29 RX ADMIN — METOPROLOL SUCCINATE 25 MG: 25 TABLET, EXTENDED RELEASE ORAL at 08:36

## 2025-03-29 RX ADMIN — ASPIRIN 81 MG: 81 TABLET, COATED ORAL at 09:55

## 2025-03-29 RX ADMIN — FUROSEMIDE 60 MG: 10 INJECTION, SOLUTION INTRAMUSCULAR; INTRAVENOUS at 03:38

## 2025-03-29 RX ADMIN — HEPARIN SODIUM 1000 UNITS/HR: 10000 INJECTION, SOLUTION INTRAVENOUS at 04:45

## 2025-03-29 RX ADMIN — PANTOPRAZOLE SODIUM 20 MG: 20 TABLET, DELAYED RELEASE ORAL at 08:36

## 2025-03-29 RX ADMIN — Medication 4 L/MIN: at 05:57

## 2025-03-29 SDOH — ECONOMIC STABILITY: FOOD INSECURITY: HOW HARD IS IT FOR YOU TO PAY FOR THE VERY BASICS LIKE FOOD, HOUSING, MEDICAL CARE, AND HEATING?: NOT HARD AT ALL

## 2025-03-29 SDOH — ECONOMIC STABILITY: INCOME INSECURITY: IN THE PAST 12 MONTHS HAS THE ELECTRIC, GAS, OIL, OR WATER COMPANY THREATENED TO SHUT OFF SERVICES IN YOUR HOME?: NO

## 2025-03-29 SDOH — SOCIAL STABILITY: SOCIAL INSECURITY: WITHIN THE LAST YEAR, HAVE YOU BEEN HUMILIATED OR EMOTIONALLY ABUSED IN OTHER WAYS BY YOUR PARTNER OR EX-PARTNER?: NO

## 2025-03-29 SDOH — ECONOMIC STABILITY: HOUSING INSECURITY: AT ANY TIME IN THE PAST 12 MONTHS, WERE YOU HOMELESS OR LIVING IN A SHELTER (INCLUDING NOW)?: NO

## 2025-03-29 SDOH — ECONOMIC STABILITY: HOUSING INSECURITY: IN THE PAST 12 MONTHS, HOW MANY TIMES HAVE YOU MOVED WHERE YOU WERE LIVING?: 0

## 2025-03-29 SDOH — HEALTH STABILITY: MENTAL HEALTH: HOW OFTEN DO YOU HAVE SIX OR MORE DRINKS ON ONE OCCASION?: NEVER

## 2025-03-29 SDOH — SOCIAL STABILITY: SOCIAL INSECURITY: WERE YOU ABLE TO COMPLETE ALL THE BEHAVIORAL HEALTH SCREENINGS?: YES

## 2025-03-29 SDOH — SOCIAL STABILITY: SOCIAL INSECURITY: WITHIN THE LAST YEAR, HAVE YOU BEEN AFRAID OF YOUR PARTNER OR EX-PARTNER?: NO

## 2025-03-29 SDOH — HEALTH STABILITY: MENTAL HEALTH: HOW OFTEN DO YOU HAVE A DRINK CONTAINING ALCOHOL?: NEVER

## 2025-03-29 SDOH — SOCIAL STABILITY: SOCIAL INSECURITY: DO YOU FEEL UNSAFE GOING BACK TO THE PLACE WHERE YOU ARE LIVING?: NO

## 2025-03-29 SDOH — SOCIAL STABILITY: SOCIAL INSECURITY: HAVE YOU HAD ANY THOUGHTS OF HARMING ANYONE ELSE?: NO

## 2025-03-29 SDOH — HEALTH STABILITY: MENTAL HEALTH: HOW MANY DRINKS CONTAINING ALCOHOL DO YOU HAVE ON A TYPICAL DAY WHEN YOU ARE DRINKING?: PATIENT DOES NOT DRINK

## 2025-03-29 SDOH — SOCIAL STABILITY: SOCIAL INSECURITY: HAVE YOU HAD THOUGHTS OF HARMING ANYONE ELSE?: NO

## 2025-03-29 SDOH — ECONOMIC STABILITY: FOOD INSECURITY: WITHIN THE PAST 12 MONTHS, THE FOOD YOU BOUGHT JUST DIDN'T LAST AND YOU DIDN'T HAVE MONEY TO GET MORE.: NEVER TRUE

## 2025-03-29 SDOH — ECONOMIC STABILITY: HOUSING INSECURITY: IN THE LAST 12 MONTHS, WAS THERE A TIME WHEN YOU WERE NOT ABLE TO PAY THE MORTGAGE OR RENT ON TIME?: NO

## 2025-03-29 SDOH — ECONOMIC STABILITY: FOOD INSECURITY: WITHIN THE PAST 12 MONTHS, YOU WORRIED THAT YOUR FOOD WOULD RUN OUT BEFORE YOU GOT THE MONEY TO BUY MORE.: NEVER TRUE

## 2025-03-29 SDOH — ECONOMIC STABILITY: TRANSPORTATION INSECURITY: IN THE PAST 12 MONTHS, HAS LACK OF TRANSPORTATION KEPT YOU FROM MEDICAL APPOINTMENTS OR FROM GETTING MEDICATIONS?: NO

## 2025-03-29 SDOH — SOCIAL STABILITY: SOCIAL INSECURITY: DOES ANYONE TRY TO KEEP YOU FROM HAVING/CONTACTING OTHER FRIENDS OR DOING THINGS OUTSIDE YOUR HOME?: NO

## 2025-03-29 SDOH — SOCIAL STABILITY: SOCIAL INSECURITY: HAS ANYONE EVER THREATENED TO HURT YOUR FAMILY OR YOUR PETS?: NO

## 2025-03-29 SDOH — SOCIAL STABILITY: SOCIAL INSECURITY: ARE YOU OR HAVE YOU BEEN THREATENED OR ABUSED PHYSICALLY, EMOTIONALLY, OR SEXUALLY BY ANYONE?: NO

## 2025-03-29 SDOH — SOCIAL STABILITY: SOCIAL INSECURITY: ARE THERE ANY APPARENT SIGNS OF INJURIES/BEHAVIORS THAT COULD BE RELATED TO ABUSE/NEGLECT?: NO

## 2025-03-29 SDOH — SOCIAL STABILITY: SOCIAL INSECURITY: DO YOU FEEL ANYONE HAS EXPLOITED OR TAKEN ADVANTAGE OF YOU FINANCIALLY OR OF YOUR PERSONAL PROPERTY?: NO

## 2025-03-29 SDOH — SOCIAL STABILITY: SOCIAL INSECURITY: ABUSE: ADULT

## 2025-03-29 ASSESSMENT — PAIN SCALES - GENERAL
PAINLEVEL_OUTOF10: 0 - NO PAIN

## 2025-03-29 ASSESSMENT — COGNITIVE AND FUNCTIONAL STATUS - GENERAL
DAILY ACTIVITIY SCORE: 24
PATIENT BASELINE BEDBOUND: NO
DAILY ACTIVITIY SCORE: 24
MOBILITY SCORE: 24
MOBILITY SCORE: 24

## 2025-03-29 ASSESSMENT — ENCOUNTER SYMPTOMS
ORTHOPNEA: 1
SHORTNESS OF BREATH: 1
DYSPNEA ON EXERTION: 1

## 2025-03-29 ASSESSMENT — LIFESTYLE VARIABLES
AUDIT-C TOTAL SCORE: 0
HOW MANY STANDARD DRINKS CONTAINING ALCOHOL DO YOU HAVE ON A TYPICAL DAY: PATIENT DOES NOT DRINK
HOW OFTEN DO YOU HAVE 6 OR MORE DRINKS ON ONE OCCASION: NEVER
HOW OFTEN DO YOU HAVE A DRINK CONTAINING ALCOHOL: NEVER
AUDIT-C TOTAL SCORE: 0
AUDIT-C TOTAL SCORE: 0
SKIP TO QUESTIONS 9-10: 1
SKIP TO QUESTIONS 9-10: 1

## 2025-03-29 ASSESSMENT — COLUMBIA-SUICIDE SEVERITY RATING SCALE - C-SSRS
2. HAVE YOU ACTUALLY HAD ANY THOUGHTS OF KILLING YOURSELF?: NO
6. HAVE YOU EVER DONE ANYTHING, STARTED TO DO ANYTHING, OR PREPARED TO DO ANYTHING TO END YOUR LIFE?: NO
1. IN THE PAST MONTH, HAVE YOU WISHED YOU WERE DEAD OR WISHED YOU COULD GO TO SLEEP AND NOT WAKE UP?: NO

## 2025-03-29 ASSESSMENT — PATIENT HEALTH QUESTIONNAIRE - PHQ9
2. FEELING DOWN, DEPRESSED OR HOPELESS: NOT AT ALL
1. LITTLE INTEREST OR PLEASURE IN DOING THINGS: NOT AT ALL
SUM OF ALL RESPONSES TO PHQ9 QUESTIONS 1 & 2: 0

## 2025-03-29 ASSESSMENT — ACTIVITIES OF DAILY LIVING (ADL)
LACK_OF_TRANSPORTATION: NO
PATIENT'S MEMORY ADEQUATE TO SAFELY COMPLETE DAILY ACTIVITIES?: YES
JUDGMENT_ADEQUATE_SAFELY_COMPLETE_DAILY_ACTIVITIES: YES
WALKS IN HOME: INDEPENDENT
TOILETING: INDEPENDENT
BATHING: INDEPENDENT
DRESSING YOURSELF: INDEPENDENT
HEARING - RIGHT EAR: FUNCTIONAL
HEARING - LEFT EAR: FUNCTIONAL
GROOMING: INDEPENDENT
ADEQUATE_TO_COMPLETE_ADL: YES
FEEDING YOURSELF: INDEPENDENT

## 2025-03-29 ASSESSMENT — PAIN - FUNCTIONAL ASSESSMENT: PAIN_FUNCTIONAL_ASSESSMENT: 0-10

## 2025-03-29 NOTE — ASSESSMENT & PLAN NOTE
Elevated BNP, chest x-ray showing some vascular congestion  EF in October was 35 to 40%; repeat, especially as ordered by outpatient cardiology  CHF order set including acronyms and outs  Will redose Lasix 40 mg IV for this afternoon  Continue metoprolol  Cardiology consult

## 2025-03-29 NOTE — ED PROVIDER NOTES
HPI   Chief Complaint   Patient presents with    Chest Pain    Shortness of Breath       Patient presents to ED for progressive shortness of breath over the last 2 to 3 days and acute onset of chest pain since arriving in the ED.  Notes shortness of breath is at rest and worse with exertion, coughing up clear phlegm denies any hemoptysis.  Does not appreciate any bilateral/neural leg swelling or unexpected weight gain.  Notes no changes in urinary habits.  Chest pain is primarily retrosternal and left parasternal, pressure-like, moderate severity, constant, no alleviating factors/triggering factors and no radiation.  Has not appreciating fever/chills.  Notes history of CABG.  States he been taking all of his medications appropriately and has not missed any dosages.  Notes no home supplemental O2 support.              Patient History   Past Medical History:   Diagnosis Date    Atherosclerosis of native coronary artery of native heart without angina pectoris     Chronic gout without tophus, unspecified cause, unspecified site     Hyperlipidemia     Hypothyroidism, unspecified 12/02/2020    Primary hypothyroidism    Other fatigue 12/02/2020    Tiredness    Personal history of other diseases of the musculoskeletal system and connective tissue 09/24/2019    History of gout    Personal history of other specified conditions 10/09/2018    History of nausea and vomiting    Type 2 diabetes mellitus with diabetic polyneuropathy, without long-term current use of insulin      Past Surgical History:   Procedure Laterality Date    CARDIAC CATHETERIZATION  03/2020    CARDIAC CATHETERIZATION N/A 10/21/2024    Procedure: Left Heart Cath, No LV, With Grafts;  Surgeon: Shine Schmidt MD;  Location: [facility];  Service: Cardiovascular;  Laterality: N/A;    CARDIAC SURGERY  2017    CORONARY ARTERY BYPASS GRAFT  01/16/2018    CABG    GALLBLADDER SURGERY  06/2023    OTHER SURGICAL HISTORY  12/08/2022    Cataract surgery  "    Family History   Problem Relation Name Age of Onset    Thyroid disease Mother      Diabetes Father      Heart disease Father      Thyroid disease Father      Hypertension Sibling       Social History     Tobacco Use    Smoking status: Never     Passive exposure: Never    Smokeless tobacco: Never   Vaping Use    Vaping status: Never Used   Substance Use Topics    Alcohol use: Yes     Comment: SOCIAL    Drug use: Never       Physical Exam   /87 (BP Location: Left arm, Patient Position: Lying)   Pulse 88   Temp 36.4 °C (97.5 °F) (Oral)   Resp 23   Ht 1.702 m (5' 7\")   Wt 103 kg (228 lb)   SpO2 94%   BMI 35.71 kg/m²       Physical Exam  Vitals and nursing note reviewed.   Constitutional:       General: He is in acute distress.      Appearance: Normal appearance. He is well-developed. He is not ill-appearing or toxic-appearing.      Comments: Appears in moderate to significant distress secondary to pulmonary symptoms   HENT:      Mouth/Throat:      Mouth: Mucous membranes are moist.      Pharynx: Oropharynx is clear.   Eyes:      Extraocular Movements: Extraocular movements intact.      Pupils: Pupils are equal, round, and reactive to light.   Cardiovascular:      Rate and Rhythm: Regular rhythm. Tachycardia present.      Pulses:           Radial pulses are 2+ on the right side and 2+ on the left side.        Dorsalis pedis pulses are 1+ on the right side and 1+ on the left side.      Comments: Difficult to auscultate heart sounds over ventilatory support equal and symmetrical distal pulses, equal and symmetrical distal BLE 1-2+ pitting edema  Pulmonary:      Effort: Tachypnea and respiratory distress present.      Breath sounds: Normal air entry. No decreased air movement. Examination of the right-middle field reveals rhonchi. Examination of the left-middle field reveals rhonchi. Examination of the right-lower field reveals rhonchi and rales. Examination of the left-lower field reveals rhonchi and " rales. Rhonchi and rales present. No decreased breath sounds.      Comments: Speaking in complete sentences, no conversational dyspnea, no requiring supplemental O2  Chest:      Chest wall: No tenderness.   Abdominal:      General: Abdomen is protuberant. There is no distension.      Palpations: There is no fluid wave.      Tenderness: There is no abdominal tenderness.      Comments: Soft and significant protuberant but no significant evidence of fluid wave   Musculoskeletal:      Right lower le+ Edema present.      Left lower le+ Edema present.   Skin:     General: Skin is warm and dry.      Coloration: Skin is not ashen or cyanotic.   Neurological:      General: No focal deficit present.      Mental Status: He is alert and oriented to person, place, and time.           ED Course & MDM   ED Course as of 25 1641   Sat Mar 29, 2025   0234 I personally reviewed and interpreted the EKG @0233: Sinus Tachy 105, normal axis/intervals and no appreciable ischemia, poor R wave progression in precordial leads, non-specific TW findings, non-specific TW changes, and prior EKG on 10/20/2024 reviewed without any appreciable specific/identifiable changes [BC]   0237 VS notable for moderately hypertensive with significantly elevated DBP, moderate tachycardia and moderate to significant tachypnea with significant hypoxia on RA in the setting of reported shortness of breath and new onset chest pain since arriving in ED, otherwise afebrile [BC]   0305 CBC and Auto Differential  Unremarkable and noncontributory to Patient condition/symptoms [BC]   0306 Blood Gas Arterial Full Panel(!)  No evidence of respiratory/metabolic acidosis in the setting of shortness of breath [BC]   0311 Comprehensive metabolic panel(!)  Unremarkable and noncontributory to Patient condition/symptoms [BC]   0312 Magnesium(!)  Moderate hypomagnesemia, unlikely to be contributory to patient's condition/symptoms [BC]   0316 B-Type Natriuretic  Peptide(!)  Mildly elevated although appears near baseline in the setting of shortness of breath and hypoxia concerning for acute on chronic HF and fluid overload [BC]   0400 XR chest 1 view  IMPRESSION:  Cardiomegaly with mild pulmonary vascular congestion.    I have personally reviewed and interpreted the images, no evidence of acute cardiopulmonary pathologies, no clinical evidence of moderate cardiomegaly with mild to moderate pulmonary vas congestion and airspace opacities consistent with pulmonary edema, agree with radiology final read   [BC]   0426 Troponin I Series, High Sensitivity (0, 1 HR)(!!)  Elevated in setting of shortness of breath and chest pain, additionally well below baseline but delta 330 without active chest pain, will treat as NSTEMI at this time [BC]   0434 Repeat EKG  I personally reviewed and interpreted the EKG @0433: NSR 96, normal axis/intervals and no appreciable ischemia, PVCs, poor R wave progression in precordial leads, non-specific TW findings, and prior EKG on 3/29/2025 reviewed without any appreciable specific/identifiable changes [BC]   0436 Discussed patient with Dr. Galvez (cardiology), agrees to treat as NSTEMI at this time and admit to medicine with consult in the a.m. [BC]      ED Course User Index  [BC] rEik Roche MD         Diagnoses as of 03/29/25 1641   Acute dyspnea   Acute chest pain   Shortness of breath   Flash pulmonary edema   NSTEMI (non-ST elevated myocardial infarction) (Multi)   Hypoxia   Respiratory distress                 No data recorded     [place] Coma Scale Score: 15 (03/29/25 0830 : Carlyn Joseph RN)                           Medical Decision Making  Patient presented to the ED for shortness of breath over the last 2 to 3 days with productive cough and acute onset of retrosternal/left parasternal chest pain upon arrival with concerning PMHx of CAD status post CABG x 4 (2017), HTN, HLD, DM2, GERD, NSTEMI (2024).  I personally reviewed and  interpreted VS, labs, images, and EKG which are as stated above in the ED course.    Assessment/evaluation consistent with NSTEMI with concern of acute HF/fluid overload and potential valvular incompetency resulting in flash pulmonary edema.  No concerning history, clinical evidence/work-up, or exam findings for the considered differentials of acute renal dysfunction/failure, anemia, COPD exacerbation, low suspicion of PE.  These conditions have been thoroughly evaluated and determined to be sufficiently unlikely to be the etiology of patient's presenting symptoms.    After receiving an appropriate exam, clinical work-up, and necessary interventions/treatment, Patient is appropriate for [facility] at this time due to concern for acute decompensation and further interrogation/management of symptoms.  Patient was encouraged to ask any questions or for clarification of today's ED encounter.  Patient is agreeable to plan of care.      Per Chart Review: PCP office visit on 3/26/2025 for management of chronic ovaries, visit summary significant for follow-up and pulmonology requesting blood work, no specific health concerns or complaints, continue established medical management chronic abilities, will obtain updated labs,.      Parts of this chart have been completed using voice-to-text recognition software. Please excuse any errors of transcription that were missed for editing/correcting.    Amount and/or Complexity of Data Reviewed  External Data Reviewed: notes.     Details: See MDM  Labs: ordered. Decision-making details documented in ED Course.  Radiology: ordered and independent interpretation performed. Decision-making details documented in ED Course.  ECG/medicine tests: ordered and independent interpretation performed. Decision-making details documented in ED Course.  Discussion of management or test interpretation with external provider(s): See ED course        Procedure  Procedures     Erik Roche,  MD  03/29/25 0417

## 2025-03-29 NOTE — PROGRESS NOTES
Spiritual Care Visit  Spiritual Care Request    Reason for Visit:  Routine Visit: Introduction     Request Received From:       Focus of Care:  Visited With: Patient         Refer to :          Spiritual Care Assessment    Spiritual Assessment:                      Care Provided:  Intended Effects: Build relationship of care and support, Convey a calming presence, Demonstrate caring and concern, Promote sense of peace    Sense of Community and or Faith Affiliation:  Nondenominational   Values/Beliefs  Spiritual Requests During Hospitalization: Mehul asked to be anointed and to have Communion today.     Addressed Needs/Concerns and/or Robert Through:     Sacramental Encounters  Communion: Patient wants communion  Communion Given Indicator: Yes  Sacrament of Sick-Anointing: Anointed    Outcome:        Advance Directives:         Spiritual Care Annotation    Annotation:  Mehul asked to be anointed and to have Communion today.  Shine Oliver

## 2025-03-29 NOTE — ASSESSMENT & PLAN NOTE
Troponin escalating in pattern consistent with ACS, though could also be secondary to the hypoxemia  EKG reviewed; shows ST depressions in lateral leads but these were present on March 26  Continue aspirin and Plavix  Per cardiology, continue heparin drip.  Will make n.p.o. for possible heart cath.

## 2025-03-29 NOTE — NURSING NOTE
Assumed care of pt. Pt alert and oriented. Refusing to take off pants and boots. NSR on the monitor with HR of 96. Bed low with call bell in reach. Care ongoing.

## 2025-03-29 NOTE — PROGRESS NOTES
Arnaldo Balderrama is a 54 y.o. male on day 0 of admission presenting with Acute dyspnea.      Subjective   Denies chest pain or shortness of breath     Objective     Last Recorded Vitals  /81 (BP Location: Left arm, Patient Position: Lying)   Pulse 95   Temp 36.8 °C (98.2 °F) (Temporal)   Resp (!) 28   Wt 103 kg (228 lb)   SpO2 94%   Intake/Output last 3 Shifts:    Intake/Output Summary (Last 24 hours) at 3/29/2025 0836  Last data filed at 3/29/2025 0730  Gross per 24 hour   Intake --   Output 1300 ml   Net -1300 ml       Admission Weight  Weight: 103 kg (228 lb) (03/29/25 0226)    Daily Weight  03/29/25 : 103 kg (228 lb)    Image Results  XR chest 1 view  Narrative: STUDY:  Chest Radiograph;  03/29/2025   02:41AM  INDICATION:  Shortness of breath.  COMPARISON:  None Available  ACCESSION NUMBER(S):  [id number]  ORDERING CLINICIAN:  FRED MELLO  TECHNIQUE:  Frontal chest was obtained at 02:41 hours.  FINDINGS:  CARDIOMEDIASTINAL SILHOUETTE:  Cardiomediastinal silhouette is mildly enlarged in size and  configuration.     LUNGS:  Increased interstitial markings are seen bilaterally.     ABDOMEN:  No remarkable upper abdominal findings.     BONES:  No acute osseous changes.  Median sternotomy changes are present.  Impression: Cardiomegaly with mild pulmonary vascular congestion.  Signed by Yan Arroyo      Physical Exam  Alert oriented x 3  Lungs clear to auscultation bilaterally  Heart regular rhythm  Abdomen soft nontender  Extremities no leg edema  CNS no focal deficit    Relevant Results  Results for orders placed or performed during the hospital encounter of 03/29/25 (from the past 24 hours)   CBC and Auto Differential   Result Value Ref Range    WBC 10.9 4.4 - 11.3 x10*3/uL    nRBC 0.0 0.0 - 0.0 /100 WBCs    RBC 5.60 4.50 - 5.90 x10*6/uL    Hemoglobin 15.7 13.5 - 17.5 g/dL    Hematocrit 48.1 41.0 - 52.0 %    MCV 86 80 - 100 fL    MCH 28.0 26.0 - 34.0 pg    MCHC 32.6 32.0 - 36.0 g/dL    RDW 13.7  11.5 - 14.5 %    Platelets 250 150 - 450 x10*3/uL    Neutrophils % 63.6 40.0 - 80.0 %    Immature Granulocytes %, Automated 0.7 0.0 - 0.9 %    Lymphocytes % 26.1 13.0 - 44.0 %    Monocytes % 7.6 2.0 - 10.0 %    Eosinophils % 1.4 0.0 - 6.0 %    Basophils % 0.6 0.0 - 2.0 %    Neutrophils Absolute 6.95 1.20 - 7.70 x10*3/uL    Immature Granulocytes Absolute, Automated 0.08 0.00 - 0.70 x10*3/uL    Lymphocytes Absolute 2.85 1.20 - 4.80 x10*3/uL    Monocytes Absolute 0.83 0.10 - 1.00 x10*3/uL    Eosinophils Absolute 0.15 0.00 - 0.70 x10*3/uL    Basophils Absolute 0.07 0.00 - 0.10 x10*3/uL   Magnesium   Result Value Ref Range    Magnesium 1.23 (L) 1.60 - 2.40 mg/dL   Comprehensive metabolic panel   Result Value Ref Range    Glucose 152 (H) 74 - 99 mg/dL    Sodium 137 136 - 145 mmol/L    Potassium 3.8 3.5 - 5.3 mmol/L    Chloride 104 98 - 107 mmol/L    Bicarbonate 24 21 - 32 mmol/L    Anion Gap 13 10 - 20 mmol/L    Urea Nitrogen 16 6 - 23 mg/dL    Creatinine 0.95 0.50 - 1.30 mg/dL    eGFR >90 >60 mL/min/1.73m*2    Calcium 8.9 8.6 - 10.3 mg/dL    Albumin 4.0 3.4 - 5.0 g/dL    Alkaline Phosphatase 75 33 - 120 U/L    Total Protein 7.0 6.4 - 8.2 g/dL    AST 21 9 - 39 U/L    Bilirubin, Total 0.7 0.0 - 1.2 mg/dL    ALT 29 10 - 52 U/L   B-Type Natriuretic Peptide   Result Value Ref Range     (H) 0 - 99 pg/mL   Troponin I, High Sensitivity, Initial   Result Value Ref Range    Troponin I, High Sensitivity 155 (HH) 0 - 20 ng/L   Blood Gas Arterial Full Panel   Result Value Ref Range    POCT pH, Arterial 7.40 7.38 - 7.42 pH    POCT pCO2, Arterial 36 (L) 38 - 42 mm Hg    POCT pO2, Arterial 139 (H) 85 - 95 mm Hg    POCT SO2, Arterial 99 94 - 100 %    POCT Oxy Hemoglobin, Arterial 96.8 94.0 - 98.0 %    POCT Hematocrit Calculated, Arterial 45.0 41.0 - 52.0 %    POCT Sodium, Arterial 135 (L) 136 - 145 mmol/L    POCT Potassium, Arterial 3.7 3.5 - 5.3 mmol/L    POCT Chloride, Arterial 102 98 - 107 mmol/L    POCT Ionized Calcium,  Arterial 1.20 1.10 - 1.33 mmol/L    POCT Glucose, Arterial 166 (H) 74 - 99 mg/dL    POCT Lactate, Arterial 1.7 0.4 - 2.0 mmol/L    POCT Base Excess, Arterial -2.0 -2.0 - 3.0 mmol/L    POCT HCO3 Calculated, Arterial 22.3 22.0 - 26.0 mmol/L    POCT Hemoglobin, Arterial 15.1 13.5 - 17.5 g/dL    POCT Anion Gap, Arterial 14 10 - 25 mmo/L    Patient Temperature 37.0 degrees Celsius    FiO2 50 %    Ventilator Mode CPAP     Cpap 10.0 cm H2O    Site of Arterial Puncture Radial Left     Jamie's Test Positive    Troponin, High Sensitivity, 1 Hour   Result Value Ref Range    Troponin I, High Sensitivity 483 (HH) 0 - 20 ng/L   aPTT   Result Value Ref Range    aPTT 24.3 22.0 - 32.5 seconds   POCT GLUCOSE   Result Value Ref Range    POCT Glucose 164 (H) 74 - 99 mg/dL   Coagulation Screen   Result Value Ref Range    Protime 11.5 9.3 - 12.7 seconds    INR 1.1 0.9 - 1.2    aPTT 53.0 (H) 22.0 - 32.5 seconds   POCT GLUCOSE   Result Value Ref Range    POCT Glucose 139 (H) 74 - 99 mg/dL     XR chest 1 view    Result Date: 3/29/2025  STUDY: Chest Radiograph;  03/29/2025   02:41AM INDICATION: Shortness of breath. COMPARISON: None Available ACCESSION NUMBER(S): [id number] ORDERING CLINICIAN: FRED MELLO TECHNIQUE:  Frontal chest was obtained at 02:41 hours. FINDINGS: CARDIOMEDIASTINAL SILHOUETTE: Cardiomediastinal silhouette is mildly enlarged in size and configuration.  LUNGS: Increased interstitial markings are seen bilaterally.  ABDOMEN: No remarkable upper abdominal findings.  BONES: No acute osseous changes.  Median sternotomy changes are present.    Cardiomegaly with mild pulmonary vascular congestion. Signed by Yan Arroyo     Assessment/Plan   Acute on chronic systolic heart failure, EF 35 to 40%  Acute hypoxic respiratory failure  NSTEMI  Type 2 diabetes mellitus, obesity    Plan:  Patient received a dose of IV Lasix.  Continue metoprolol  Supplemental oxygen, wean as tolerated  Continue aspirin and Plavix, heparin drip,  consult cardiology  N.p.o. for possible heart catheterization.  The patient is refusing cath       Edil Akins MD

## 2025-03-29 NOTE — ED PROCEDURE NOTE
Procedure  Critical Care    Performed by: Erik Roche MD  Authorized by: Erik Roche MD    Critical care provider statement:     Critical care time (minutes):  35    Critical care time was exclusive of:  Separately billable procedures and treating other patients and teaching time    Critical care was necessary to treat or prevent imminent or life-threatening deterioration of the following conditions:  Circulatory failure, cardiac failure, respiratory failure, sepsis, metabolic crisis and renal failure    Critical care was time spent personally by me on the following activities:  Development of treatment plan with patient or surrogate, discussions with consultants, evaluation of patient's response to treatment, examination of patient, interpretation of cardiac output measurements, ordering and performing treatments and interventions, ordering and review of laboratory studies, ordering and review of radiographic studies, pulse oximetry, re-evaluation of patient's condition, review of old charts and ventilator management    Care discussed with: admitting provider                 Erik Roche MD  03/29/25 9584

## 2025-03-29 NOTE — CARE PLAN
The patient's goals for the shift include      The clinical goals for the shift include safety/ monitor O2    Problem: Pain - Adult  Goal: Verbalizes/displays adequate comfort level or baseline comfort level  Outcome: Progressing     Problem: Safety - Adult  Goal: Free from fall injury  Outcome: Progressing     Problem: Discharge Planning  Goal: Discharge to home or other facility with appropriate resources  Outcome: Progressing     Problem: Chronic Conditions and Co-morbidities  Goal: Patient's chronic conditions and co-morbidity symptoms are monitored and maintained or improved  Outcome: Progressing     Problem: Nutrition  Goal: Nutrient intake appropriate for maintaining nutritional needs  Outcome: Progressing     Problem: Skin  Goal: Decreased wound size/increased tissue granulation at next dressing change  Outcome: Progressing  Goal: Participates in plan/prevention/treatment measures  Outcome: Progressing  Goal: Prevent/manage excess moisture  Outcome: Progressing  Goal: Prevent/minimize sheer/friction injuries  Outcome: Progressing  Goal: Promote/optimize nutrition  Outcome: Progressing  Goal: Promote skin healing  Outcome: Progressing     Problem: Fall/Injury  Goal: Not fall by end of shift  Outcome: Progressing  Goal: Be free from injury by end of the shift  Outcome: Progressing  Goal: Verbalize understanding of personal risk factors for fall in the hospital  Outcome: Progressing  Goal: Verbalize understanding of risk factor reduction measures to prevent injury from fall in the home  Outcome: Progressing  Goal: Use assistive devices by end of the shift  Outcome: Progressing  Goal: Pace activities to prevent fatigue by end of the shift  Outcome: Progressing     Problem: Respiratory  Goal: Clear secretions with interventions this shift  Outcome: Progressing  Goal: Minimize anxiety/maximize coping throughout shift  Outcome: Progressing  Goal: Minimal/no exertional discomfort or dyspnea this shift  Outcome:  Progressing  Goal: No signs of respiratory distress (eg. Use of accessory muscles. Peds grunting)  Outcome: Progressing  Goal: Patent airway maintained this shift  Outcome: Progressing  Goal: Tolerate mechanical ventilation evidenced by VS/agitation level this shift  Outcome: Progressing  Goal: Tolerate pulmonary toileting this shift  Outcome: Progressing  Goal: Verbalize decreased shortness of breath this shift  Outcome: Progressing  Goal: Wean oxygen to maintain O2 saturation per order/standard this shift  Outcome: Progressing  Goal: Increase self care and/or family involvement in next 24 hours  Outcome: Progressing     Problem: Pain  Goal: Takes deep breaths with improved pain control throughout the shift  Outcome: Progressing  Goal: Turns in bed with improved pain control throughout the shift  Outcome: Progressing  Goal: Walks with improved pain control throughout the shift  Outcome: Progressing  Goal: Performs ADL's with improved pain control throughout shift  Outcome: Progressing  Goal: Participates in PT with improved pain control throughout the shift  Outcome: Progressing  Goal: Free from opioid side effects throughout the shift  Outcome: Progressing  Goal: Free from acute confusion related to pain meds throughout the shift  Outcome: Progressing

## 2025-03-29 NOTE — ED TRIAGE NOTES
Pt came in through triage SOB. Pt was brought straight back d/t spot being 77% on RA. Pt 88% on 6LNC. MD and RT bedside immediately. RT placing pt on c-pap.

## 2025-03-29 NOTE — H&P
History Of Present Illness  Arnalod Balderrama is a 54 y.o. male presenting with shortness of breath.  Patient has a history of coronary artery disease status post CABG 2017 x 4 and NSTEMI in October 2024, left heart catheterization showing 100% left main distal stenosis and 80% diffuse proximal mL disease in RCA, but 4 patent grafts; maximized medical therapy was recommended at this point.    Patient has had some worsening shortness of breath over the last few days, and saw cardiology and primary care few days ago; metoprolol was increased and echocardiogram and event monitor recommended.  Tonight, the patient went to a fish [place], coming back and feeling more short of breath but attributed this to nasal congestion and postnasal drip that has had.  He fell asleep, waking up at around 2100 for short of breath, progressing to the point where he was considering calling 911 at around midnight, rather driving himself in.  He had associated chest discomfort at times when driving in.  In the emergency department, found to be hypoxemic in the 70s, placed on nasal cannula eventually to CPAP.  He received Lasix 60 mg IV once, and 1 troponin escalating from the 100-400 range, cardiology was consulted over the phone and heparin drip was recommended.     Past Medical History  He has a past medical history of Atherosclerosis of native coronary artery of native heart without angina pectoris, Chronic gout without tophus, unspecified cause, unspecified site, Hyperlipidemia, Hypothyroidism, unspecified (12/02/2020), Other fatigue (12/02/2020), Personal history of other diseases of the musculoskeletal system and connective tissue (09/24/2019), Personal history of other specified conditions (10/09/2018), and Type 2 diabetes mellitus with diabetic polyneuropathy, without long-term current use of insulin.    Surgical History  He has a past surgical history that includes Coronary artery bypass graft (01/16/2018); Other surgical history  (12/08/2022); Cardiac surgery (2017); Cardiac catheterization (03/2020); Gallbladder surgery (06/2023); and Cardiac catheterization (N/A, 10/21/2024).     Social History  He reports that he has never smoked. He has never been exposed to tobacco smoke. He has never used smokeless tobacco. He reports current alcohol use. He reports that he does not use drugs.    Family History  Family History   Problem Relation Name Age of Onset    Thyroid disease Mother      Diabetes Father      Heart disease Father      Thyroid disease Father      Hypertension Sibling          Allergies  Patient has no known allergies.    Review of Systems   All other systems reviewed and are negative.  Per HPI     Physical Exam     Last Recorded Vitals  /68   Pulse 96   Temp 36.7 °C (98.1 °F) (Axillary)   Resp (!) 26   Wt 103 kg (228 lb)   SpO2 94%     Relevant Results        Results for orders placed or performed during the hospital encounter of 03/29/25 (from the past 24 hours)   CBC and Auto Differential   Result Value Ref Range    WBC 10.9 4.4 - 11.3 x10*3/uL    nRBC 0.0 0.0 - 0.0 /100 WBCs    RBC 5.60 4.50 - 5.90 x10*6/uL    Hemoglobin 15.7 13.5 - 17.5 g/dL    Hematocrit 48.1 41.0 - 52.0 %    MCV 86 80 - 100 fL    MCH 28.0 26.0 - 34.0 pg    MCHC 32.6 32.0 - 36.0 g/dL    RDW 13.7 11.5 - 14.5 %    Platelets 250 150 - 450 x10*3/uL    Neutrophils % 63.6 40.0 - 80.0 %    Immature Granulocytes %, Automated 0.7 0.0 - 0.9 %    Lymphocytes % 26.1 13.0 - 44.0 %    Monocytes % 7.6 2.0 - 10.0 %    Eosinophils % 1.4 0.0 - 6.0 %    Basophils % 0.6 0.0 - 2.0 %    Neutrophils Absolute 6.95 1.20 - 7.70 x10*3/uL    Immature Granulocytes Absolute, Automated 0.08 0.00 - 0.70 x10*3/uL    Lymphocytes Absolute 2.85 1.20 - 4.80 x10*3/uL    Monocytes Absolute 0.83 0.10 - 1.00 x10*3/uL    Eosinophils Absolute 0.15 0.00 - 0.70 x10*3/uL    Basophils Absolute 0.07 0.00 - 0.10 x10*3/uL   Magnesium   Result Value Ref Range    Magnesium 1.23 (L) 1.60 - 2.40 mg/dL    Comprehensive metabolic panel   Result Value Ref Range    Glucose 152 (H) 74 - 99 mg/dL    Sodium 137 136 - 145 mmol/L    Potassium 3.8 3.5 - 5.3 mmol/L    Chloride 104 98 - 107 mmol/L    Bicarbonate 24 21 - 32 mmol/L    Anion Gap 13 10 - 20 mmol/L    Urea Nitrogen 16 6 - 23 mg/dL    Creatinine 0.95 0.50 - 1.30 mg/dL    eGFR >90 >60 mL/min/1.73m*2    Calcium 8.9 8.6 - 10.3 mg/dL    Albumin 4.0 3.4 - 5.0 g/dL    Alkaline Phosphatase 75 33 - 120 U/L    Total Protein 7.0 6.4 - 8.2 g/dL    AST 21 9 - 39 U/L    Bilirubin, Total 0.7 0.0 - 1.2 mg/dL    ALT 29 10 - 52 U/L   B-Type Natriuretic Peptide   Result Value Ref Range     (H) 0 - 99 pg/mL   Troponin I, High Sensitivity, Initial   Result Value Ref Range    Troponin I, High Sensitivity 155 (HH) 0 - 20 ng/L   Blood Gas Arterial Full Panel   Result Value Ref Range    POCT pH, Arterial 7.40 7.38 - 7.42 pH    POCT pCO2, Arterial 36 (L) 38 - 42 mm Hg    POCT pO2, Arterial 139 (H) 85 - 95 mm Hg    POCT SO2, Arterial 99 94 - 100 %    POCT Oxy Hemoglobin, Arterial 96.8 94.0 - 98.0 %    POCT Hematocrit Calculated, Arterial 45.0 41.0 - 52.0 %    POCT Sodium, Arterial 135 (L) 136 - 145 mmol/L    POCT Potassium, Arterial 3.7 3.5 - 5.3 mmol/L    POCT Chloride, Arterial 102 98 - 107 mmol/L    POCT Ionized Calcium, Arterial 1.20 1.10 - 1.33 mmol/L    POCT Glucose, Arterial 166 (H) 74 - 99 mg/dL    POCT Lactate, Arterial 1.7 0.4 - 2.0 mmol/L    POCT Base Excess, Arterial -2.0 -2.0 - 3.0 mmol/L    POCT HCO3 Calculated, Arterial 22.3 22.0 - 26.0 mmol/L    POCT Hemoglobin, Arterial 15.1 13.5 - 17.5 g/dL    POCT Anion Gap, Arterial 14 10 - 25 mmo/L    Patient Temperature 37.0 degrees Celsius    FiO2 50 %    Ventilator Mode CPAP     Cpap 10.0 cm H2O    Site of Arterial Puncture Radial Left     Jamie's Test Positive    Troponin, High Sensitivity, 1 Hour   Result Value Ref Range    Troponin I, High Sensitivity 483 (HH) 0 - 20 ng/L   aPTT   Result Value Ref Range    aPTT 24.3 22.0 -  32.5 seconds         Assessment/Plan   Assessment & Plan  Acute on chronic systolic congestive heart failure  Elevated BNP, chest x-ray showing some vascular congestion  EF in October was 35 to 40%; repeat, especially as ordered by outpatient cardiology  CHF order set including acronyms and outs  Will redose Lasix 40 mg IV for this afternoon  Continue metoprolol  Cardiology consult  Acute respiratory failure with hypoxia  Secondary to acute congestive heart failure  Much improved on 4 L.  Wean oxygen as able.  NSTEMI (non-ST elevated myocardial infarction) (Multi)  Troponin escalating in pattern consistent with ACS, though could also be secondary to the hypoxemia  EKG reviewed; shows ST depressions in lateral leads but these were present on March 26  Continue aspirin and Plavix  Per cardiology, continue heparin drip.  Will make n.p.o. for possible heart cath.  Type 2 diabetes mellitus, without long-term current use of insulin (Multi)  Sliding scale while inpatient.           Bradley Tony, DO

## 2025-03-29 NOTE — CONSULTS
Inpatient consult to Cardiology  Consult performed by: PHOENIX Grimes-CNP  Consult ordered by: Edil Akins MD  Reason for consult: Elevated troponin        History Of Present Illness:    Arnaldo Balderrama is a 54 y.o. male presenting with shortness of breath.  He follows Dr. Schmidt for cardiology.  He has a past medical history of coronary artery disease status post coronary artery bypass grafting x 4 in 2017 (LIMA-LAD, SVG-OM2-D1, SVG-rPDA), type 2 diabetes, hypertensive disorder, hyperlipidemia and heart failure with reduced ejection fraction (35 to 40%).  The patient had a cardiac catheterization in October 2024 in the setting of an NSTEMI which revealed 100% distal stenosis of the left main, 80% diffuse proximal to mid right coronary artery disease, but 4 out of 4 of his bypass grafts were patent.  The patient reports to me that he has had worsening shortness of breath over the last few days as well as palpitations, he was seen in his regular cardiology office this past week and an echocardiogram was recommended.  He states he thought that the shortness of breath was related to nasal congestion and postnasal drip, but it around 0100 this morning he had significant dyspnea prompting his presentation to the emergency department.  The patient does report intermittent chest discomfort while driving to the emergency department, though he attributed this to anxiety. He denies feelings of palpitations or diaphoresis.  Denies nausea or vomiting.  EKG completed in the emergency department revealing sinus rhythm with mild ST depression in the lateral leads, consistent with prior EKG from 3/26/2025.  He was found to be hypoxic in the 70s and was initially placed on nasal cannula but was transition to CPAP.  Lab work the emergency department significant for sodium 137, potassium 3.8, creatinine 0.95 and hemoglobin 15.7.  High-sensitivity troponin I levels trended upward at 155 and 483.  BNP elevated 469.  Magnesium  low at 1.23.  Chest x-ray revealed cardiomegaly with mild pulmonary vascular congestion.  He was given IV magnesium, nitroglycerin, 60 mg of IV Lasix and was started on heparin drip and admitted to the hospital on telemetry for further assessment and management.    Review of Systems   Cardiovascular:  Positive for dyspnea on exertion and orthopnea.   Respiratory:  Positive for shortness of breath.    All other systems reviewed and are negative.        Last Recorded Vitals:  Vitals:    03/29/25 0430 03/29/25 0500 03/29/25 0600 03/29/25 0730   BP: 148/76 131/68 152/88 140/81   BP Location:   Left arm Left arm   Patient Position:   Lying Lying   Pulse: 98 96 95    Resp: (!) 25 (!) 26 (!) 28    Temp:   36.8 °C (98.3 °F) 36.8 °C (98.2 °F)   TempSrc:   Temporal Temporal   SpO2: 99% 94% 94%    Weight:       Height:           Last Labs:  CBC - 3/29/2025:  2:38 AM  10.9 15.7 250    48.1      CMP - 3/29/2025:  2:38 AM  8.9 7.0 21 --- 0.7   4.4 4.0 29 75      PTT - 3/29/2025:  6:04 AM  1.1   11.5 53.0     Troponin I, High Sensitivity   Date/Time Value Ref Range Status   03/29/2025 03:38  (HH) 0 - 20 ng/L Final     Comment:     Previous result verified on 3/29/2025 0318 on specimen/case [id number] called with component TRP for procedure Troponin I, High Sensitivity, Initial with value 155 ng/L.   03/29/2025 02:38  (HH) 0 - 20 ng/L Final   10/20/2024 03:20  (HH) 0 - 20 ng/L Final     Comment:     Previous result verified on 10/20/2024 1029 on specimen/case [id number] called with component [facility] for procedure Troponin I, High Sensitivity, Initial with value 514 ng/L.     BNP   Date/Time Value Ref Range Status   03/29/2025 02:38  (H) 0 - 99 pg/mL Final   10/20/2024 09:58  (H) 0 - 99 pg/mL Final     POC HEMOGLOBIN A1c   Date/Time Value Ref Range Status   01/15/2025 04:05 PM 7.4 (A) 4.2 - 6.5 % Final   10/01/2024 03:37 PM 8.3 (A) 4.2 - 6.5 % Final     LDL Calculated   Date/Time Value Ref  Range Status   10/20/2024 10:59 AM 68 <=99 mg/dL Final     Comment:                                 Near   Borderline      AGE      Desirable  Optimal    High     High     Very High     0-19 Y     0 - 109     ---    110-129   >/= 130     ----    20-24 Y     0 - 119     ---    120-159   >/= 160     ----      >24 Y     0 -  99   100-129  130-159   160-189     >/=190     12/29/2023 03:47 PM 64 (L) 65 - 130 mg/dL Final   06/12/2023 03:04 PM 59 (L) 65 - 130 MG/DL Final   01/06/2022 04:33 PM 88 65 - 130 MG/DL Final   10/07/2020 04:02 PM 69 65 - 130 MG/DL Final     VLDL   Date/Time Value Ref Range Status   10/20/2024 10:59 AM 18 0 - 40 mg/dL Final   09/24/2022 09:05 AM 24 0 - 40 mg/dL Final   06/18/2022 09:09 AM 29 0 - 40 mg/dL Final   12/05/2020 09:38 AM 15 0 - 40 mg/dL Final      Last I/O:  I/O last 3 completed shifts:  In: - (0 mL/kg)   Out: 600 (5.8 mL/kg) [Urine:600 (0.2 mL/kg/hr)]  Weight: 103.4 kg     Past Cardiology Tests (Last 3 Years):  EKG:  ECG 12 lead (Clinic Performed) 03/26/2025 (Preliminary)      ECG 12 lead 10/20/2024      ECG 12 lead 05/30/2024      ECG 12 lead (Clinic Performed) 11/07/2023    Echo:  Transthoracic Echo (TTE) Complete 10/21/2024  CONCLUSIONS:   1. The left ventricular systolic function is moderately decreased, with a visually estimated ejection fraction of 35-40%.   2. There is global hypokinesis of the left ventricle with minor regional variations.   3. Spectral Doppler shows an abnormal pattern of left ventricular diastolic filling.    Ejection Fractions:  EF   Date/Time Value Ref Range Status   10/21/2024 09:50 AM 38 %      Cath:  Cardiac Catheterization Procedure 10/21/2024  CONCLUSIONS:   1. Left main: 100% distal stenosis.   2. RCA: 80% diffuse prox-mid disease.   3. 4/4 patent grafts: (1) LIMA - LAD (2) sequential SVG - OM2 - D1 (3) SVG -rPDA.   4. LVEDP 19mmHg, no aortic stenosis on LV-Ao gradient.      Past Medical History:  He has a past medical history of Atherosclerosis of  native coronary artery of native heart without angina pectoris, Chronic gout without tophus, unspecified cause, unspecified site, Hyperlipidemia, Hypothyroidism, unspecified (12/02/2020), Other fatigue (12/02/2020), Personal history of other diseases of the musculoskeletal system and connective tissue (09/24/2019), Personal history of other specified conditions (10/09/2018), and Type 2 diabetes mellitus with diabetic polyneuropathy, without long-term current use of insulin.    Past Surgical History:  He has a past surgical history that includes Coronary artery bypass graft (01/16/2018); Other surgical history (12/08/2022); Cardiac surgery (2017); Cardiac catheterization (03/2020); Gallbladder surgery (06/2023); and Cardiac catheterization (N/A, 10/21/2024).      Social History:  He reports that he has never smoked. He has never been exposed to tobacco smoke. He has never used smokeless tobacco. He reports current alcohol use. He reports that he does not use drugs.    Family History:  Family History   Problem Relation Name Age of Onset    Thyroid disease Mother      Diabetes Father      Heart disease Father      Thyroid disease Father      Hypertension Sibling          Allergies:  Patient has no known allergies.    Inpatient Medications:  Scheduled medications   Medication Dose Route Frequency    aspirin  81 mg oral Daily    atorvastatin  80 mg oral Nightly    cholecalciferol  2,000 Units oral Daily    clopidogrel  75 mg oral Daily    cyanocobalamin  250 mcg oral Daily    empagliflozin  25 mg oral Daily    ezetimibe  10 mg oral Nightly    fluticasone furoate-vilanteroL  1 puff inhalation Daily    furosemide  40 mg intravenous Daily    insulin lispro  0-10 Units subcutaneous q4h    isosorbide mononitrate ER  60 mg oral Nightly    lisinopril  20 mg oral Daily    metoprolol succinate XL  25 mg oral BID    oxygen   inhalation Continuous - Inhalation    oxygen   inhalation Continuous - Inhalation    pantoprazole  20 mg  oral Daily before breakfast    polyethylene glycol  17 g oral Daily     PRN medications   Medication    acetaminophen    Or    acetaminophen    Or    acetaminophen    heparin (porcine)    nitroglycerin     Continuous Medications   Medication Dose Last Rate    heparin  0-4,000 Units/hr 1,000 Units/hr (03/29/25 0602)     Outpatient Medications:  Current Outpatient Medications   Medication Instructions    albuterol (Ventolin HFA) 90 mcg/actuation inhaler 2 puffs, inhalation, Every 4 hours PRN    aspirin 81 mg EC tablet 1 tablet, Daily    atorvastatin (LIPITOR) 80 mg, oral, Nightly    blood sugar diagnostic (Blood Glucose Test) strip Test blood sugar two times daily    budesonide-formoteroL (Symbicort) 160-4.5 mcg/actuation inhaler 2 puffs, inhalation, 2 times daily, Rinse mouth with water after use to reduce aftertaste and incidence of candidiasis. Do not swallow.    cholecalciferol (VITAMIN D-3) 2,000 Units, oral, Daily    clopidogrel (PLAVIX) 75 mg, oral, Daily    cyanocobalamin (VITAMIN B-12) 250 mcg, Daily    ezetimibe (ZETIA) 10 mg, oral, Nightly    furosemide (LASIX) 40 mg, oral, Daily    glipiZIDE (GLUCOTROL) 5 mg, oral, 2 times daily before meals    isosorbide mononitrate ER (IMDUR) 60 mg, oral, Nightly, Do not crush or chew.    Jardiance 25 mg, oral, Daily    lisinopril 20 mg, oral, Daily    metFORMIN (GLUCOPHAGE) 1,000 mg, oral, 2 times daily (morning and late afternoon)    metoprolol succinate XL (TOPROL-XL) 25 mg, oral, 2 times daily, Do not crush or chew.    pantoprazole (PROTONIX) 20 mg, oral, Daily before breakfast, Do not crush, chew, or split.       Physical Exam  Vitals reviewed.   HENT:      Head: Normocephalic and atraumatic.   Cardiovascular:      Rate and Rhythm: Normal rate and regular rhythm.      Heart sounds: No murmur heard.     No friction rub. No gallop.   Pulmonary:      Effort: Pulmonary effort is normal. No respiratory distress.      Comments: Crackles throughout  Abdominal:       General: Bowel sounds are normal.      Palpations: Abdomen is soft.   Musculoskeletal:      Right lower leg: No edema.      Left lower leg: No edema.   Skin:     General: Skin is warm and dry.      Capillary Refill: Capillary refill takes less than 2 seconds.   Neurological:      Mental Status: He is alert and oriented to person, place, and time.   Psychiatric:         Mood and Affect: Mood normal.         Behavior: Behavior normal.           Assessment/Plan   Type I vs. Type II NSTEMI  Acute on Chronic Systolic Heart Failure (35-40%)  Acute Hypoxic Respiratory Failure  Coronary Artery Disease s/p CABG x4 (2017)  Hypertensive Disorder  Hyperlipidemia  Suspected Obstructive Sleep Apnea    Impression and Plan:    3/29: As described above.  Patient presenting with significant shortness of breath as well as intermittent chest discomfort.  High-sensitivity troponin I levels trended upward from 155-483.  He was started on heparin drip in the emergency department.  On my exam the patient is resting comfortably in bed.  Reports he still feeling short of breath at times but denies to current chest pain.  Currently in sinus rhythm on telemetry without significant ectopy.  Blood pressures mildly elevated with last recorded 140/81.  Remains on supplemental nasal cannula oxygen with pulse oximetry of 94%.  Regarding this patient's elevation high-sensitivity troponins, believe this is likely related to a type II injury related to the patient significant hypoxia on admission to the hospital.  However, given his significant cardiac history there is also some concern for a type I injury, thus I will continue his IV heparin drip for at least 24 hours.  As stated previously, he had a very recent cardiac catheterization in October 2024 which revealed that all 4 of his bypass grafts were patent.  No plans for cardiac catheterization today, and I will order the patient a cardiac diet.  Will continue diuresis with 40 mg of IV Lasix twice  daily.  Appreciate attention to strict intakes outputs as well as daily weights.  Continue guideline directed medical therapy with metoprolol succinate 25 mg twice daily, lisinopril and Jardiance.  Continue dual antiplatelet therapy with Plavix and aspirin.  Continue high intensity statin.  Agree with obtaining echocardiogram this admission.  Will continue to follow the patient with you.    Peripheral IV 03/29/25 20 G Distal;Right;Upper Arm (Active)   Site Assessment Clean;Dry;Intact 03/29/25 0239   Dressing Type Transparent 03/29/25 0239   Line Status Flushed 03/29/25 0239   Dressing Status Clean;Dry;Occlusive 03/29/25 0239   Number of days: 0       Code Status:  Full Code        Aracelis Ty, APRN-CNP

## 2025-03-30 VITALS
RESPIRATION RATE: 27 BRPM | WEIGHT: 227.51 LBS | DIASTOLIC BLOOD PRESSURE: 70 MMHG | OXYGEN SATURATION: 92 % | HEART RATE: 79 BPM | TEMPERATURE: 97.5 F | SYSTOLIC BLOOD PRESSURE: 120 MMHG | BODY MASS INDEX: 35.71 KG/M2 | HEIGHT: 67 IN

## 2025-03-30 LAB
ANION GAP SERPL CALCULATED.3IONS-SCNC: 14 MMOL/L (ref 10–20)
BUN SERPL-MCNC: 21 MG/DL (ref 6–23)
CALCIUM SERPL-MCNC: 9.1 MG/DL (ref 8.6–10.3)
CHLORIDE SERPL-SCNC: 99 MMOL/L (ref 98–107)
CHOLEST SERPL-MCNC: 99 MG/DL (ref 0–199)
CHOLEST/HDLC SERPL: 3 {RATIO}
CO2 SERPL-SCNC: 26 MMOL/L (ref 21–32)
CREAT SERPL-MCNC: 1.27 MG/DL (ref 0.5–1.3)
EGFRCR SERPLBLD CKD-EPI 2021: 68 ML/MIN/1.73M*2
GLUCOSE BLD MANUAL STRIP-MCNC: 160 MG/DL (ref 74–99)
GLUCOSE BLD MANUAL STRIP-MCNC: 166 MG/DL (ref 74–99)
GLUCOSE BLD MANUAL STRIP-MCNC: 180 MG/DL (ref 74–99)
GLUCOSE BLD MANUAL STRIP-MCNC: 219 MG/DL (ref 74–99)
GLUCOSE BLD MANUAL STRIP-MCNC: 296 MG/DL (ref 74–99)
GLUCOSE SERPL-MCNC: 150 MG/DL (ref 74–99)
HDLC SERPL-MCNC: 32.6 MG/DL
HOLD SPECIMEN: NORMAL
HOLD SPECIMEN: NORMAL
LDLC SERPL CALC-MCNC: 47 MG/DL
NON HDL CHOLESTEROL: 66 MG/DL (ref 0–149)
POTASSIUM SERPL-SCNC: 4 MMOL/L (ref 3.5–5.3)
SODIUM SERPL-SCNC: 135 MMOL/L (ref 136–145)
TRIGL SERPL-MCNC: 96 MG/DL (ref 0–149)
VLDL: 19 MG/DL (ref 0–40)

## 2025-03-30 PROCEDURE — 94640 AIRWAY INHALATION TREATMENT: CPT

## 2025-03-30 PROCEDURE — 2500000004 HC RX 250 GENERAL PHARMACY W/ HCPCS (ALT 636 FOR OP/ED)

## 2025-03-30 PROCEDURE — 80061 LIPID PANEL: CPT | Performed by: INTERNAL MEDICINE

## 2025-03-30 PROCEDURE — 2500000002 HC RX 250 W HCPCS SELF ADMINISTERED DRUGS (ALT 637 FOR MEDICARE OP, ALT 636 FOR OP/ED): Performed by: FAMILY MEDICINE

## 2025-03-30 PROCEDURE — 36415 COLL VENOUS BLD VENIPUNCTURE: CPT | Performed by: INTERNAL MEDICINE

## 2025-03-30 PROCEDURE — 2500000002 HC RX 250 W HCPCS SELF ADMINISTERED DRUGS (ALT 637 FOR MEDICARE OP, ALT 636 FOR OP/ED): Performed by: INTERNAL MEDICINE

## 2025-03-30 PROCEDURE — 99233 SBSQ HOSP IP/OBS HIGH 50: CPT | Performed by: FAMILY MEDICINE

## 2025-03-30 PROCEDURE — 2500000001 HC RX 250 WO HCPCS SELF ADMINISTERED DRUGS (ALT 637 FOR MEDICARE OP): Performed by: INTERNAL MEDICINE

## 2025-03-30 PROCEDURE — 82947 ASSAY GLUCOSE BLOOD QUANT: CPT

## 2025-03-30 PROCEDURE — 2060000001 HC INTERMEDIATE ICU ROOM DAILY

## 2025-03-30 PROCEDURE — 99232 SBSQ HOSP IP/OBS MODERATE 35: CPT | Performed by: NURSE PRACTITIONER

## 2025-03-30 PROCEDURE — 2500000004 HC RX 250 GENERAL PHARMACY W/ HCPCS (ALT 636 FOR OP/ED): Performed by: FAMILY MEDICINE

## 2025-03-30 PROCEDURE — 80048 BASIC METABOLIC PNL TOTAL CA: CPT | Performed by: NURSE PRACTITIONER

## 2025-03-30 PROCEDURE — 2500000004 HC RX 250 GENERAL PHARMACY W/ HCPCS (ALT 636 FOR OP/ED): Performed by: NURSE PRACTITIONER

## 2025-03-30 RX ORDER — AZITHROMYCIN 250 MG/1
250 TABLET, FILM COATED ORAL
Status: DISCONTINUED | OUTPATIENT
Start: 2025-03-30 | End: 2025-03-31 | Stop reason: HOSPADM

## 2025-03-30 RX ORDER — OXYMETAZOLINE HCL 0.05 %
2 SPRAY, NON-AEROSOL (ML) NASAL EVERY 12 HOURS PRN
Status: DISCONTINUED | OUTPATIENT
Start: 2025-03-30 | End: 2025-03-31 | Stop reason: HOSPADM

## 2025-03-30 RX ORDER — FUROSEMIDE 10 MG/ML
60 INJECTION INTRAMUSCULAR; INTRAVENOUS
Status: DISCONTINUED | OUTPATIENT
Start: 2025-03-30 | End: 2025-03-31

## 2025-03-30 RX ORDER — IPRATROPIUM BROMIDE AND ALBUTEROL SULFATE 2.5; .5 MG/3ML; MG/3ML
3 SOLUTION RESPIRATORY (INHALATION) EVERY 6 HOURS PRN
Status: DISCONTINUED | OUTPATIENT
Start: 2025-03-30 | End: 2025-03-31 | Stop reason: HOSPADM

## 2025-03-30 RX ORDER — GUAIFENESIN 600 MG/1
600 TABLET, EXTENDED RELEASE ORAL 2 TIMES DAILY PRN
Status: DISCONTINUED | OUTPATIENT
Start: 2025-03-30 | End: 2025-03-31 | Stop reason: HOSPADM

## 2025-03-30 RX ADMIN — FLUTICASONE FUROATE AND VILANTEROL TRIFENATATE 1 PUFF: 200; 25 POWDER RESPIRATORY (INHALATION) at 07:19

## 2025-03-30 RX ADMIN — METOPROLOL SUCCINATE 25 MG: 25 TABLET, EXTENDED RELEASE ORAL at 20:51

## 2025-03-30 RX ADMIN — CLOPIDOGREL 75 MG: 75 TABLET ORAL at 10:18

## 2025-03-30 RX ADMIN — METOPROLOL SUCCINATE 25 MG: 25 TABLET, EXTENDED RELEASE ORAL at 10:18

## 2025-03-30 RX ADMIN — Medication 2000 UNITS: at 10:18

## 2025-03-30 RX ADMIN — PANTOPRAZOLE SODIUM 20 MG: 20 TABLET, DELAYED RELEASE ORAL at 06:01

## 2025-03-30 RX ADMIN — EMPAGLIFLOZIN 25 MG: 25 TABLET, FILM COATED ORAL at 10:24

## 2025-03-30 RX ADMIN — INSULIN LISPRO 2 UNITS: 100 INJECTION, SOLUTION INTRAVENOUS; SUBCUTANEOUS at 16:48

## 2025-03-30 RX ADMIN — EZETIMIBE 10 MG: 10 TABLET ORAL at 20:51

## 2025-03-30 RX ADMIN — GUAIFENESIN 600 MG: 600 TABLET ORAL at 16:48

## 2025-03-30 RX ADMIN — AZITHROMYCIN DIHYDRATE 250 MG: 250 TABLET, FILM COATED ORAL at 10:27

## 2025-03-30 RX ADMIN — ATORVASTATIN CALCIUM 80 MG: 80 TABLET, FILM COATED ORAL at 20:51

## 2025-03-30 RX ADMIN — INSULIN LISPRO 2 UNITS: 100 INJECTION, SOLUTION INTRAVENOUS; SUBCUTANEOUS at 10:22

## 2025-03-30 RX ADMIN — FUROSEMIDE 60 MG: 10 INJECTION, SOLUTION INTRAVENOUS at 16:48

## 2025-03-30 RX ADMIN — INSULIN LISPRO 6 UNITS: 100 INJECTION, SOLUTION INTRAVENOUS; SUBCUTANEOUS at 21:19

## 2025-03-30 RX ADMIN — ASPIRIN 81 MG: 81 TABLET, COATED ORAL at 10:18

## 2025-03-30 RX ADMIN — CYANOCOBALAMIN TAB 500 MCG 250 MCG: 500 TAB at 10:18

## 2025-03-30 RX ADMIN — LISINOPRIL 20 MG: 20 TABLET ORAL at 10:19

## 2025-03-30 RX ADMIN — FUROSEMIDE 40 MG: 10 INJECTION, SOLUTION INTRAMUSCULAR; INTRAVENOUS at 06:01

## 2025-03-30 RX ADMIN — INSULIN LISPRO 4 UNITS: 100 INJECTION, SOLUTION INTRAVENOUS; SUBCUTANEOUS at 12:31

## 2025-03-30 RX ADMIN — ISOSORBIDE MONONITRATE 60 MG: 60 TABLET, EXTENDED RELEASE ORAL at 20:51

## 2025-03-30 ASSESSMENT — PAIN SCALES - GENERAL
PAINLEVEL_OUTOF10: 0 - NO PAIN
PAINLEVEL_OUTOF10: 0 - NO PAIN

## 2025-03-30 ASSESSMENT — PAIN - FUNCTIONAL ASSESSMENT: PAIN_FUNCTIONAL_ASSESSMENT: 0-10

## 2025-03-30 NOTE — PROGRESS NOTES
Ed Balderrama is a 54 y.o. male on day 1 of admission presenting with Acute dyspnea.      Subjective   Denies chest pain or shortness of breath  Complains of cough and postnasal drip     Objective     Last Recorded Vitals  /63 (BP Location: Left arm, Patient Position: Lying)   Pulse 85   Temp 36.2 °C (97.2 °F) (Temporal)   Resp 26   Wt 103 kg (227 lb 8.2 oz)   SpO2 96%   Intake/Output last 3 Shifts:    Intake/Output Summary (Last 24 hours) at 3/30/2025 0846  Last data filed at 3/30/2025 0719  Gross per 24 hour   Intake 771.83 ml   Output 4555 ml   Net -3783.17 ml       Admission Weight  Weight: 103 kg (228 lb) (03/29/25 0226)    Daily Weight  03/30/25 : 103 kg (227 lb 8.2 oz)    Image Results  Transthoracic echo (TTE) [place] [address]             Phone [phone]    TRANSTHORACIC ECHOCARDIOGRAM REPORT    Patient Name:       ED BALDERRAMA     Reading Physician:    [id number] Galvez DO  Study Date:         3/29/2025            Ordering Provider:    [id number] BECCA DO  MRN/PID:            [id number]             Fellow:  Accession#:         [id number]         Nurse:  Date of Birth/Age:  1970 / 54 years Sonographer:          Daisha Sheldon                                                                 [facility]  Gender Assigned at  M                    Additional Staff:  Birth:  Height:             170.18 cm            Admit Date:  Weight:             103.42 kg            Admission Status:     Inpatient -                                                                 Routine  BSA / BMI:          2.14 m2 / 35.71      Department Location:  [facility]                      kg/m2                                      Down  Blood Pressure: 152 /88 mmHg    Study Type:    TRANSTHORACIC ECHO (TTE)  LIMITED  Diagnosis/ICD: Dyspnea, unspecified-R06.00  Indication:    CHF, AC DYSPNEA  CPT Codes:     Echo Limited-19515; Doppler Limited-65422; Color Doppler-56502    Patient History:  Diabetes:          Yes  Pertinent History: CHF, Dyspnea, HTN and NSTEMI, S/P CABG, H/O MI.    Study Detail: The following Echo studies were performed: 2D, M-Mode, Doppler and                color flow. Definity used as a contrast agent for endocardial                border definition. Total contrast used for this procedure was 2 mL                via IV push.       PHYSICIAN INTERPRETATION:  Left Ventricle: Left ventricular ejection fraction is mildly decreased, by visual estimate at 40-45%. Wall motion is abnormal. The left ventricular cavity size is moderately dilated. There is normal septal and normal posterior left ventricular wall thickness. Spectral Doppler shows a normal pattern of left ventricular diastolic filling. Global LV hypokinesis with severe inferobasal wall hypokinesis.  Left Atrium: The left atrial size is normal.  Right Ventricle: The right ventricle is normal in size. There is normal right ventricular global systolic function.  Right Atrium: The right atrial size is normal.  Aortic Valve: The aortic valve appears abnormal. There is mild aortic valve cusp calcification. There is mild aortic valve thickening. There is evidence of mildly elevated transaortic gradients consistent with sclerosis of the aortic valve.  There is no evidence of aortic valve regurgitation. The peak instantaneous gradient of the aortic valve is 6 mmHg.  Mitral Valve: The mitral valve is abnormal. There is moderate to severe mitral annular calcification. There is no evidence of mitral valve regurgitation.  Tricuspid Valve: The tricuspid valve is structurally normal. There is trace to mild tricuspid regurgitation.  Pulmonic Valve: The pulmonic valve is structurally normal. There is no indication of pulmonic valve regurgitation.  Pericardium: No  pericardial effusion noted.  Aorta: The aortic root is normal.       CONCLUSIONS:   1. Left ventricular ejection fraction is mildly decreased, by visual estimate at 40-45%.   2. Abnormal wall motion.   3. Left ventricular cavity size is moderately dilated.   4. There is normal right ventricular global systolic function.   5. There is moderate to severe mitral annular calcification.   6. Aortic valve sclerosis.    QUANTITATIVE DATA SUMMARY:     2D MEASUREMENTS:           Normal Ranges:  LAs:             3.60 cm   (2.7-4.0cm)  IVSd:            0.64 cm   (0.6-1.1cm)  LVPWd:           0.69 cm   (0.6-1.1cm)  LVIDd:           4.84 cm   (3.9-5.9cm)  LVIDs:           4.08 cm  LV Mass Index:   47.5 g/m2  LV % FS          15.7 %       M-MODE MEASUREMENTS:         Normal Ranges:  Ao Root:             2.20 cm (2.0-3.7cm)       LV SYSTOLIC FUNCTION:                       Normal Ranges:  EF-Visual:      43 %  LV EF Reported: 43 %       LV DIASTOLIC FUNCTION:           Normal Ranges:  MV e'                  0.063 m/s (>8.0)  MV lateral e'          0.06 m/s  MV medial e'           0.06 m/s       AORTIC VALVE:           Normal Ranges:  AoV Vmax:      1.18 m/s (<=1.7m/s)  AoV Peak P.6 mmHg (<20mmHg)  LVOT Max Nadir:  1.00 m/s (<=1.1m/s)  LVOT Diameter: 1.80 cm  (1.8-2.4cm)  AoV Area,Vmax: 2.15 cm2 (2.5-4.5cm2)       RIGHT VENTRICLE:  RV s' 0.06 m/s       TRICUSPID VALVE/RVSP:          Normal Ranges:  Peak TR Velocity:     1.95 m/s  RV Syst Pressure:     18 mmHg  (< 30mmHg)       [id number] Chandrakant Galvez DO  Electronically signed on 3/29/2025 at 11:26:35 AM       ** Final **  XR chest 1 view  Narrative: STUDY:  Chest Radiograph;  2025   02:41AM  INDICATION:  Shortness of breath.  COMPARISON:  None Available  ACCESSION NUMBER(S):  [id number]  ORDERING CLINICIAN:  FRED MELLO  TECHNIQUE:  Frontal chest was obtained at 02:41 hours.  FINDINGS:  CARDIOMEDIASTINAL SILHOUETTE:  Cardiomediastinal silhouette is mildly enlarged in  size and  configuration.     LUNGS:  Increased interstitial markings are seen bilaterally.     ABDOMEN:  No remarkable upper abdominal findings.     BONES:  No acute osseous changes.  Median sternotomy changes are present.  Impression: Cardiomegaly with mild pulmonary vascular congestion.  Signed by Yan Arroyo      Physical Exam  Alert oriented x 3  Lungs clear to auscultation bilaterally  Heart regular rhythm  Abdomen soft nontender  Extremities no leg edema  CNS no focal deficit    Relevant Results  Results for orders placed or performed during the hospital encounter of 03/29/25 (from the past 24 hours)   Transthoracic echo (TTE) complete   Result Value Ref Range    AV pk maria 1.18 m/s    LVOT diam 1.80 cm    LV EF 43 %    RV free wall pk S' 5.98 cm/s    RVSP 18.2 mmHg    LVIDd 4.84 cm    AV pk grad 6 mmHg    Aortic Valve Area by Continuity of Peak Velocity 2.15 cm2   POCT GLUCOSE   Result Value Ref Range    POCT Glucose 130 (H) 74 - 99 mg/dL   POCT GLUCOSE   Result Value Ref Range    POCT Glucose 139 (H) 74 - 99 mg/dL   POCT GLUCOSE   Result Value Ref Range    POCT Glucose 239 (H) 74 - 99 mg/dL   Lipid Panel   Result Value Ref Range    Cholesterol 99 0 - 199 mg/dL    HDL-Cholesterol 32.6 mg/dL    Cholesterol/HDL Ratio 3.0     LDL Calculated 47 <=99 mg/dL    VLDL 19 0 - 40 mg/dL    Triglycerides 96 0 - 149 mg/dL    Non HDL Cholesterol 66 0 - 149 mg/dL   Light Blue Top   Result Value Ref Range    Extra Tube Hold for add-ons.    Lavender Top   Result Value Ref Range    Extra Tube Hold for add-ons.    POCT GLUCOSE   Result Value Ref Range    POCT Glucose 160 (H) 74 - 99 mg/dL   POCT GLUCOSE   Result Value Ref Range    POCT Glucose 166 (H) 74 - 99 mg/dL     Transthoracic echo (TTE) complete    Result Date: 3/29/2025           [facility] [id number] [place], [place] [id number]            Phone [phone] TRANSTHORACIC ECHOCARDIOGRAM REPORT Patient Name:       ED Noe Physician:     [id number] Chandrakant Galvez DO Study Date:         3/29/2025            Ordering Provider:    [id number] BECCA PATELEMERSON MRN/PID:            [id number]             Fellow: Accession#:         [id number]         Nurse: Date of Birth/Age:  1970 / 54 years Sonographer:          Daisha Sheldon RDCS Gender Assigned at  M                    Additional Staff: Birth: Height:             170.18 cm            Admit Date: Weight:             103.42 kg            Admission Status:     Inpatient -                                                                Routine BSA / BMI:          2.14 m2 / 35.71      Department Location:  [facility]                     kg/m2                                      Down Blood Pressure: 152 /88 mmHg Study Type:    TRANSTHORACIC ECHO (TTE) LIMITED Diagnosis/ICD: Dyspnea, unspecified-R06.00 Indication:    CHF, AC DYSPNEA CPT Codes:     Echo Limited-51958; Doppler Limited-74848; Color Doppler-85229 Patient History: Diabetes:          Yes Pertinent History: CHF, Dyspnea, HTN and NSTEMI, S/P CABG, H/O MI. Study Detail: The following Echo studies were performed: 2D, M-Mode, Doppler and               color flow. Definity used as a contrast agent for endocardial               border definition. Total contrast used for this procedure was 2 mL               via IV push.  PHYSICIAN INTERPRETATION: Left Ventricle: Left ventricular ejection fraction is mildly decreased, by visual estimate at 40-45%. Wall motion is abnormal. The left ventricular cavity size is moderately dilated. There is normal septal and normal posterior left ventricular wall thickness. Spectral Doppler shows a normal pattern of left ventricular diastolic filling. Global LV hypokinesis with severe inferobasal wall hypokinesis. Left Atrium: The left  atrial size is normal. Right Ventricle: The right ventricle is normal in size. There is normal right ventricular global systolic function. Right Atrium: The right atrial size is normal. Aortic Valve: The aortic valve appears abnormal. There is mild aortic valve cusp calcification. There is mild aortic valve thickening. There is evidence of mildly elevated transaortic gradients consistent with sclerosis of the aortic valve. There is no evidence of aortic valve regurgitation. The peak instantaneous gradient of the aortic valve is 6 mmHg. Mitral Valve: The mitral valve is abnormal. There is moderate to severe mitral annular calcification. There is no evidence of mitral valve regurgitation. Tricuspid Valve: The tricuspid valve is structurally normal. There is trace to mild tricuspid regurgitation. Pulmonic Valve: The pulmonic valve is structurally normal. There is no indication of pulmonic valve regurgitation. Pericardium: No pericardial effusion noted. Aorta: The aortic root is normal.  CONCLUSIONS:  1. Left ventricular ejection fraction is mildly decreased, by visual estimate at 40-45%.  2. Abnormal wall motion.  3. Left ventricular cavity size is moderately dilated.  4. There is normal right ventricular global systolic function.  5. There is moderate to severe mitral annular calcification.  6. Aortic valve sclerosis. QUANTITATIVE DATA SUMMARY:  2D MEASUREMENTS:           Normal Ranges: LAs:             3.60 cm   (2.7-4.0cm) IVSd:            0.64 cm   (0.6-1.1cm) LVPWd:           0.69 cm   (0.6-1.1cm) LVIDd:           4.84 cm   (3.9-5.9cm) LVIDs:           4.08 cm LV Mass Index:   47.5 g/m2 LV % FS          15.7 %  M-MODE MEASUREMENTS:         Normal Ranges: Ao Root:             2.20 cm (2.0-3.7cm)  LV SYSTOLIC FUNCTION:                      Normal Ranges: EF-Visual:      43 % LV EF Reported: 43 %  LV DIASTOLIC FUNCTION:           Normal Ranges: MV e'                  0.063 m/s (>8.0) MV lateral e'          0.06 m/s  MV medial e'           0.06 m/s  AORTIC VALVE:           Normal Ranges: AoV Vmax:      1.18 m/s (<=1.7m/s) AoV Peak P.6 mmHg (<20mmHg) LVOT Max Nadir:  1.00 m/s (<=1.1m/s) LVOT Diameter: 1.80 cm  (1.8-2.4cm) AoV Area,Vmax: 2.15 cm2 (2.5-4.5cm2)  RIGHT VENTRICLE: RV s' 0.06 m/s  TRICUSPID VALVE/RVSP:          Normal Ranges: Peak TR Velocity:     1.95 m/s RV Syst Pressure:     18 mmHg  (< 30mmHg)  [id number] Chandrakant Galvez  Electronically signed on 3/29/2025 at 11:26:35 AM  ** Final **     XR chest 1 view    Result Date: 3/29/2025  STUDY: Chest Radiograph;  2025   02:41AM INDICATION: Shortness of breath. COMPARISON: None Available ACCESSION NUMBER(S): [id number] ORDERING CLINICIAN: FRED MELLO TECHNIQUE:  Frontal chest was obtained at 02:41 hours. FINDINGS: CARDIOMEDIASTINAL SILHOUETTE: Cardiomediastinal silhouette is mildly enlarged in size and configuration.  LUNGS: Increased interstitial markings are seen bilaterally.  ABDOMEN: No remarkable upper abdominal findings.  BONES: No acute osseous changes.  Median sternotomy changes are present.    Cardiomegaly with mild pulmonary vascular congestion. Signed by Yan Arroyo     Assessment/Plan   Acute on chronic systolic heart failure, EF 40 to 45%  Acute hypoxic respiratory failure  Elevated troponin in the setting of decompensated heart failure  Acute sinusitis  Type 2 diabetes mellitus, obesity    Plan:  Continue IV Lasix, metoprolol, aspirin and Plavix  Supplemental oxygen, wean as tolerated  Will and Zithromax 250 mg daily, Afrin nasal spray, and [facility]  Cardiology recommendations reviewed, anticipate discharge tomorrow  Echocardiogram yesterday returned with a marginally improved ejection fraction of 40 to 45% compared to previous of 35 to 40%  DVT prophylaxis     Edil Akins MD

## 2025-03-30 NOTE — CARE PLAN
Problem: Pain - Adult  Goal: Verbalizes/displays adequate comfort level or baseline comfort level  Outcome: Progressing     Problem: Safety - Adult  Goal: Free from fall injury  Outcome: Progressing     Problem: Discharge Planning  Goal: Discharge to home or other facility with appropriate resources  Outcome: Progressing     Problem: Chronic Conditions and Co-morbidities  Goal: Patient's chronic conditions and co-morbidity symptoms are monitored and maintained or improved  Outcome: Progressing     Problem: Nutrition  Goal: Nutrient intake appropriate for maintaining nutritional needs  Outcome: Progressing     Problem: Skin  Goal: Decreased wound size/increased tissue granulation at next dressing change  Outcome: Progressing  Goal: Participates in plan/prevention/treatment measures  Outcome: Progressing  Goal: Prevent/manage excess moisture  Outcome: Progressing  Goal: Prevent/minimize sheer/friction injuries  Outcome: Progressing  Goal: Promote/optimize nutrition  Outcome: Progressing  Goal: Promote skin healing  Outcome: Progressing     Problem: Fall/Injury  Goal: Not fall by end of shift  Outcome: Progressing  Goal: Be free from injury by end of the shift  Outcome: Progressing  Goal: Verbalize understanding of personal risk factors for fall in the hospital  Outcome: Progressing  Goal: Verbalize understanding of risk factor reduction measures to prevent injury from fall in the home  Outcome: Progressing  Goal: Use assistive devices by end of the shift  Outcome: Progressing  Goal: Pace activities to prevent fatigue by end of the shift  Outcome: Progressing     Problem: Respiratory  Goal: Clear secretions with interventions this shift  Outcome: Progressing  Goal: Minimize anxiety/maximize coping throughout shift  Outcome: Progressing  Goal: Minimal/no exertional discomfort or dyspnea this shift  Outcome: Progressing  Goal: No signs of respiratory distress (eg. Use of accessory muscles. Peds grunting)  Outcome:  Progressing  Goal: Patent airway maintained this shift  Outcome: Progressing  Goal: Tolerate mechanical ventilation evidenced by VS/agitation level this shift  Outcome: Progressing  Goal: Tolerate pulmonary toileting this shift  Outcome: Progressing  Goal: Verbalize decreased shortness of breath this shift  Outcome: Progressing  Goal: Wean oxygen to maintain O2 saturation per order/standard this shift  Outcome: Progressing  Goal: Increase self care and/or family involvement in next 24 hours  Outcome: Progressing     Problem: Pain  Goal: Takes deep breaths with improved pain control throughout the shift  Outcome: Progressing  Goal: Turns in bed with improved pain control throughout the shift  Outcome: Progressing  Goal: Walks with improved pain control throughout the shift  Outcome: Progressing  Goal: Performs ADL's with improved pain control throughout shift  Outcome: Progressing  Goal: Participates in PT with improved pain control throughout the shift  Outcome: Progressing  Goal: Free from opioid side effects throughout the shift  Outcome: Progressing  Goal: Free from acute confusion related to pain meds throughout the shift  Outcome: Progressing   The patient's goals for the shift include  maintain safety    The clinical goals for the shift include Monitor for SOB    .

## 2025-03-30 NOTE — CARE PLAN
Problem: Pain - Adult  Goal: Verbalizes/displays adequate comfort level or baseline comfort level  Outcome: Progressing     Problem: Safety - Adult  Goal: Free from fall injury  Outcome: Progressing     Problem: Discharge Planning  Goal: Discharge to home or other facility with appropriate resources  Outcome: Progressing     Problem: Chronic Conditions and Co-morbidities  Goal: Patient's chronic conditions and co-morbidity symptoms are monitored and maintained or improved  Outcome: Progressing     Problem: Nutrition  Goal: Nutrient intake appropriate for maintaining nutritional needs  Outcome: Progressing     Problem: Skin  Goal: Decreased wound size/increased tissue granulation at next dressing change  Outcome: Progressing  Flowsheets (Taken 3/29/2025 2237)  Decreased wound size/increased tissue granulation at next dressing change:   Promote sleep for wound healing   Protective dressings over bony prominences   Utilize specialty bed per algorithm  Goal: Participates in plan/prevention/treatment measures  Outcome: Progressing  Flowsheets (Taken 3/29/2025 2237)  Participates in plan/prevention/treatment measures:   Discuss with provider PT/OT consult   Elevate heels   Increase activity/out of bed for meals  Goal: Prevent/manage excess moisture  Outcome: Progressing  Flowsheets (Taken 3/29/2025 2237)  Prevent/manage excess moisture:   Cleanse incontinence/protect with barrier cream   Monitor for/manage infection if present   Follow provider orders for dressing changes   Use wicking fabric (obtain order)   Moisturize dry skin  Goal: Prevent/minimize sheer/friction injuries  Outcome: Progressing  Goal: Promote/optimize nutrition  Outcome: Progressing  Goal: Promote skin healing  Outcome: Progressing     Problem: Fall/Injury  Goal: Not fall by end of shift  Outcome: Progressing  Goal: Be free from injury by end of the shift  Outcome: Progressing  Goal: Verbalize understanding of personal risk factors for fall in the  hospital  Outcome: Progressing  Goal: Verbalize understanding of risk factor reduction measures to prevent injury from fall in the home  Outcome: Progressing  Goal: Use assistive devices by end of the shift  Outcome: Progressing  Goal: Pace activities to prevent fatigue by end of the shift  Outcome: Progressing     Problem: Respiratory  Goal: Clear secretions with interventions this shift  Outcome: Progressing  Goal: Minimize anxiety/maximize coping throughout shift  Outcome: Progressing  Goal: Minimal/no exertional discomfort or dyspnea this shift  Outcome: Progressing  Goal: No signs of respiratory distress (eg. Use of accessory muscles. Peds grunting)  Outcome: Progressing  Goal: Patent airway maintained this shift  Outcome: Progressing  Goal: Tolerate mechanical ventilation evidenced by VS/agitation level this shift  Outcome: Progressing  Goal: Tolerate pulmonary toileting this shift  Outcome: Progressing  Goal: Verbalize decreased shortness of breath this shift  Outcome: Progressing  Goal: Wean oxygen to maintain O2 saturation per order/standard this shift  Outcome: Progressing  Goal: Increase self care and/or family involvement in next 24 hours  Outcome: Progressing     Problem: Pain  Goal: Takes deep breaths with improved pain control throughout the shift  Outcome: Progressing  Goal: Turns in bed with improved pain control throughout the shift  Outcome: Progressing  Goal: Walks with improved pain control throughout the shift  Outcome: Progressing  Goal: Performs ADL's with improved pain control throughout shift  Outcome: Progressing  Goal: Participates in PT with improved pain control throughout the shift  Outcome: Progressing  Goal: Free from opioid side effects throughout the shift  Outcome: Progressing  Goal: Free from acute confusion related to pain meds throughout the shift  Outcome: Progressing   The patient's goals for the shift include      The clinical goals for the shift include safety/ monitor  O2    Over the shift, the patient did not make progress toward the following goals. Barriers to progression include . Recommendations to address these barriers include .

## 2025-03-30 NOTE — PROGRESS NOTES
Arnaldo Balderrama is a 54 y.o. male on day 1 of admission presenting with Acute dyspnea.    Patient has no orders for TCC, PT, OT consults.  Advised patient that RNCC is available should any discharge needs arise.     Patient lives with his wife and daughters in a multi-level house. No use of assistive devices, no issues using the stairs. He is independent with ADLs, iADLs, works two jobs, and drives. Pcp is Aminata Manuel CNP.   Plan is to discharge home with no needs, his car is in the parking lot.       Bessie Carnes RN

## 2025-03-30 NOTE — PROGRESS NOTES
"Arnaldo Balderrama is a 54 y.o. male on day 2 of admission presenting with Acute dyspnea.    Subjective   Patient states minimal improvement overall, continues to have shortness of breath.  No chest pain.       Objective     Physical Exam  Vitals and nursing note reviewed.   Constitutional:       General: He is not in acute distress.     Appearance: He is obese. He is ill-appearing. He is not toxic-appearing.   HENT:      Head: Normocephalic and atraumatic.      Nose: Nose normal.      Mouth/Throat:      Mouth: Mucous membranes are moist.      Pharynx: Oropharynx is clear.   Cardiovascular:      Rate and Rhythm: Normal rate and regular rhythm.      Pulses: Normal pulses.      Heart sounds: Normal heart sounds. No murmur heard.     No friction rub. No gallop.   Pulmonary:      Effort: Pulmonary effort is normal.      Comments: Diminished breath sounds throughout.  Mild expiratory wheeze, fine crackles to bilateral bases  Abdominal:      General: There is distension.   Musculoskeletal:         General: Normal range of motion.      Cervical back: Normal range of motion.      Right lower leg: Edema present.      Left lower leg: Edema present.      Comments: +2 bilateral lower extremity pitting edema to bilateral pretibial plateaus   Skin:     General: Skin is warm and dry.      Capillary Refill: Capillary refill takes less than 2 seconds.   Neurological:      Mental Status: He is alert and oriented to person, place, and time. Mental status is at baseline.   Psychiatric:         Mood and Affect: Mood normal.         Behavior: Behavior normal.         Thought Content: Thought content normal.         Judgment: Judgment normal.         Last Recorded Vitals  Blood pressure 109/63, pulse 85, temperature 36.2 °C (97.2 °F), temperature source Temporal, resp. rate 26, height 1.702 m (5' 7\"), weight 103 kg (227 lb 8.2 oz), SpO2 96%.  Intake/Output last 3 Shifts:  I/O last 3 completed shifts:  In: 771.8 (7.5 mL/kg) [P.O.:720; " I.V.:51.8 (0.5 mL/kg)]  Out: 5405 (52.4 mL/kg) [Urine:5405 (1.5 mL/kg/hr)]  Weight: 103.2 kg     Relevant Results  Results for orders placed or performed during the hospital encounter of 03/29/25 (from the past 24 hours)   Transthoracic echo (TTE) complete   Result Value Ref Range    AV pk maria 1.18 m/s    LVOT diam 1.80 cm    LV EF 43 %    RV free wall pk S' 5.98 cm/s    RVSP 18.2 mmHg    LVIDd 4.84 cm    AV pk grad 6 mmHg    Aortic Valve Area by Continuity of Peak Velocity 2.15 cm2   POCT GLUCOSE   Result Value Ref Range    POCT Glucose 130 (H) 74 - 99 mg/dL   POCT GLUCOSE   Result Value Ref Range    POCT Glucose 139 (H) 74 - 99 mg/dL   POCT GLUCOSE   Result Value Ref Range    POCT Glucose 239 (H) 74 - 99 mg/dL   Lipid Panel   Result Value Ref Range    Cholesterol 99 0 - 199 mg/dL    HDL-Cholesterol 32.6 mg/dL    Cholesterol/HDL Ratio 3.0     LDL Calculated 47 <=99 mg/dL    VLDL 19 0 - 40 mg/dL    Triglycerides 96 0 - 149 mg/dL    Non HDL Cholesterol 66 0 - 149 mg/dL   Light Blue Top   Result Value Ref Range    Extra Tube Hold for add-ons.    Lavender Top   Result Value Ref Range    Extra Tube Hold for add-ons.    POCT GLUCOSE   Result Value Ref Range    POCT Glucose 160 (H) 74 - 99 mg/dL   POCT GLUCOSE   Result Value Ref Range    POCT Glucose 166 (H) 74 - 99 mg/dL         Assessment/Plan   Assessment & Plan  Acute on chronic systolic congestive heart failure    Acute respiratory failure with hypoxia    NSTEMI (non-ST elevated myocardial infarction) (Multi)    Type 2 diabetes mellitus, without long-term current use of insulin (Multi)      Type I vs. Type II NSTEMI  Acute on Chronic Systolic Heart Failure (35-40%)  Acute Hypoxic Respiratory Failure  Coronary Artery Disease s/p CABG x4 (2017)  Hypertensive Disorder  Hyperlipidemia  Suspected Obstructive Sleep Apnea     Impression and Plan:     3/29: As described above.  Patient presenting with significant shortness of breath as well as intermittent chest discomfort.   High-sensitivity troponin I levels trended upward from 155-483.  He was started on heparin drip in the emergency department.  On my exam the patient is resting comfortably in bed.  Reports he still feeling short of breath at times but denies to current chest pain.  Currently in sinus rhythm on telemetry without significant ectopy.  Blood pressures mildly elevated with last recorded 140/81.  Remains on supplemental nasal cannula oxygen with pulse oximetry of 94%.  Regarding this patient's elevation high-sensitivity troponins, believe this is likely related to a type II injury related to the patient significant hypoxia on admission to the hospital.  However, given his significant cardiac history there is also some concern for a type I injury, thus I will continue his IV heparin drip for at least 24 hours.  As stated previously, he had a very recent cardiac catheterization in October 2024 which revealed that all 4 of his bypass grafts were patent.  No plans for cardiac catheterization today, and I will order the patient a cardiac diet.  Will continue diuresis with 40 mg of IV Lasix twice daily.  Appreciate attention to strict intakes outputs as well as daily weights.  Continue guideline directed medical therapy with metoprolol succinate 25 mg twice daily, lisinopril and Jardiance.  Continue dual antiplatelet therapy with Plavix and aspirin.  Continue high intensity statin.  Agree with obtaining echocardiogram this admission.  Will continue to follow the patient with you    3/30: Stable overnight.  Denies complaints chest pain or pressure palpitations or feeling of rapid heart rate.  The patient's heparin drip was discontinued yesterday and he remains chest pain-free.  States his breathing is minimally improved, however his current pulse ox on room air is 92 to 93%.  He states he is worried about his ability to return to his job tomorrow and his current state.  I's and O's are documented as a negative fluid 5 status of  approximately 4000 mL over the past 24 hours.  Patient states he does not feel that he diuresed that much.  Most recent creatinine 0.95 with a potassium of 3.8.  Will increase IV diuresis from 40 mg twice daily to 60 mg twice daily.  Will recheck BMP in the morning.  Patient states he would like to discharge to home tonight or tomorrow morning.  I have requested that he stay at least till tomorrow morning to stabilize him further.  He appears agreeable with this at this time.  He continues on guideline directed therapy.  On telemetry monitor he made a sinus rhythm with no significant ectopy.  His blood pressure stable at 109/53.  Otherwise his echocardiogram yesterday returned with a marginally improved ejection fraction of 40 to 45% compared to previous of 35 to 40%.  Will follow with you.    I spent 35 minutes in the professional and overall care of this patient.      Nayan Chavez, APRN-CNP

## 2025-03-31 VITALS
DIASTOLIC BLOOD PRESSURE: 76 MMHG | SYSTOLIC BLOOD PRESSURE: 135 MMHG | WEIGHT: 229.72 LBS | TEMPERATURE: 97.9 F | BODY MASS INDEX: 36.06 KG/M2 | RESPIRATION RATE: 20 BRPM | HEIGHT: 67 IN | OXYGEN SATURATION: 91 % | HEART RATE: 81 BPM

## 2025-03-31 LAB
ANION GAP SERPL CALCULATED.3IONS-SCNC: 13 MMOL/L (ref 10–20)
ATRIAL RATE: 105 BPM
ATRIAL RATE: 96 BPM
BUN SERPL-MCNC: 34 MG/DL (ref 6–23)
CALCIUM SERPL-MCNC: 8.9 MG/DL (ref 8.6–10.3)
CHLORIDE SERPL-SCNC: 99 MMOL/L (ref 98–107)
CO2 SERPL-SCNC: 27 MMOL/L (ref 21–32)
CREAT SERPL-MCNC: 1.29 MG/DL (ref 0.5–1.3)
EGFRCR SERPLBLD CKD-EPI 2021: 66 ML/MIN/1.73M*2
GLUCOSE BLD MANUAL STRIP-MCNC: 118 MG/DL (ref 74–99)
GLUCOSE BLD MANUAL STRIP-MCNC: 176 MG/DL (ref 74–99)
GLUCOSE BLD MANUAL STRIP-MCNC: 97 MG/DL (ref 74–99)
GLUCOSE SERPL-MCNC: 112 MG/DL (ref 74–99)
HOLD SPECIMEN: NORMAL
P AXIS: 20 DEGREES
P AXIS: 47 DEGREES
P OFFSET: 206 MS
P OFFSET: 208 MS
P ONSET: 155 MS
P ONSET: 155 MS
POTASSIUM SERPL-SCNC: 3.8 MMOL/L (ref 3.5–5.3)
PR INTERVAL: 134 MS
PR INTERVAL: 136 MS
Q ONSET: 222 MS
Q ONSET: 223 MS
QRS COUNT: 16 BEATS
QRS COUNT: 17 BEATS
QRS DURATION: 92 MS
QRS DURATION: 96 MS
QT INTERVAL: 336 MS
QT INTERVAL: 356 MS
QTC CALCULATION(BAZETT): 444 MS
QTC CALCULATION(BAZETT): 449 MS
QTC FREDERICIA: 404 MS
QTC FREDERICIA: 416 MS
R AXIS: 49 DEGREES
R AXIS: 51 DEGREES
SODIUM SERPL-SCNC: 135 MMOL/L (ref 136–145)
T AXIS: 152 DEGREES
T AXIS: 154 DEGREES
T OFFSET: 391 MS
T OFFSET: 400 MS
VENTRICULAR RATE: 105 BPM
VENTRICULAR RATE: 96 BPM

## 2025-03-31 PROCEDURE — 99232 SBSQ HOSP IP/OBS MODERATE 35: CPT | Performed by: NURSE PRACTITIONER

## 2025-03-31 PROCEDURE — 2500000004 HC RX 250 GENERAL PHARMACY W/ HCPCS (ALT 636 FOR OP/ED): Performed by: NURSE PRACTITIONER

## 2025-03-31 PROCEDURE — 36415 COLL VENOUS BLD VENIPUNCTURE: CPT | Performed by: NURSE PRACTITIONER

## 2025-03-31 PROCEDURE — 2500000004 HC RX 250 GENERAL PHARMACY W/ HCPCS (ALT 636 FOR OP/ED): Performed by: FAMILY MEDICINE

## 2025-03-31 PROCEDURE — 94660 CPAP INITIATION&MGMT: CPT

## 2025-03-31 PROCEDURE — 99239 HOSP IP/OBS DSCHRG MGMT >30: CPT | Performed by: INTERNAL MEDICINE

## 2025-03-31 PROCEDURE — 94640 AIRWAY INHALATION TREATMENT: CPT

## 2025-03-31 PROCEDURE — 9420000001 HC RT PATIENT EDUCATION 5 MIN

## 2025-03-31 PROCEDURE — 2500000001 HC RX 250 WO HCPCS SELF ADMINISTERED DRUGS (ALT 637 FOR MEDICARE OP): Performed by: INTERNAL MEDICINE

## 2025-03-31 PROCEDURE — 82947 ASSAY GLUCOSE BLOOD QUANT: CPT

## 2025-03-31 PROCEDURE — 2500000002 HC RX 250 W HCPCS SELF ADMINISTERED DRUGS (ALT 637 FOR MEDICARE OP, ALT 636 FOR OP/ED): Performed by: INTERNAL MEDICINE

## 2025-03-31 PROCEDURE — 2500000002 HC RX 250 W HCPCS SELF ADMINISTERED DRUGS (ALT 637 FOR MEDICARE OP, ALT 636 FOR OP/ED): Performed by: FAMILY MEDICINE

## 2025-03-31 PROCEDURE — 82374 ASSAY BLOOD CARBON DIOXIDE: CPT | Performed by: NURSE PRACTITIONER

## 2025-03-31 RX ORDER — AZITHROMYCIN 250 MG/1
TABLET, FILM COATED ORAL
Qty: 4 TABLET | Refills: 0 | Status: SHIPPED | OUTPATIENT
Start: 2025-03-31 | End: 2025-04-05

## 2025-03-31 RX ORDER — FUROSEMIDE 40 MG/1
40 TABLET ORAL DAILY
Status: DISCONTINUED | OUTPATIENT
Start: 2025-03-31 | End: 2025-03-31

## 2025-03-31 RX ORDER — FUROSEMIDE 40 MG/1
40 TABLET ORAL DAILY
Status: DISCONTINUED | OUTPATIENT
Start: 2025-04-01 | End: 2025-03-31 | Stop reason: HOSPADM

## 2025-03-31 RX ORDER — NITROGLYCERIN 0.4 MG/1
0.4 TABLET SUBLINGUAL EVERY 5 MIN PRN
Qty: 90 TABLET | Refills: 12 | Status: SHIPPED | OUTPATIENT
Start: 2025-03-31 | End: 2025-04-30

## 2025-03-31 RX ORDER — FUROSEMIDE 40 MG/1
40 TABLET ORAL DAILY
Qty: 90 TABLET | Refills: 3 | Status: SHIPPED | OUTPATIENT
Start: 2025-03-31

## 2025-03-31 RX ADMIN — FUROSEMIDE 60 MG: 10 INJECTION, SOLUTION INTRAVENOUS at 06:08

## 2025-03-31 RX ADMIN — LISINOPRIL 20 MG: 20 TABLET ORAL at 08:28

## 2025-03-31 RX ADMIN — METOPROLOL SUCCINATE 25 MG: 25 TABLET, EXTENDED RELEASE ORAL at 08:28

## 2025-03-31 RX ADMIN — EMPAGLIFLOZIN 25 MG: 25 TABLET, FILM COATED ORAL at 08:28

## 2025-03-31 RX ADMIN — CYANOCOBALAMIN TAB 500 MCG 250 MCG: 500 TAB at 08:27

## 2025-03-31 RX ADMIN — INSULIN LISPRO 2 UNITS: 100 INJECTION, SOLUTION INTRAVENOUS; SUBCUTANEOUS at 08:28

## 2025-03-31 RX ADMIN — CLOPIDOGREL 75 MG: 75 TABLET ORAL at 08:26

## 2025-03-31 RX ADMIN — PANTOPRAZOLE SODIUM 20 MG: 20 TABLET, DELAYED RELEASE ORAL at 06:08

## 2025-03-31 RX ADMIN — Medication 2000 UNITS: at 08:27

## 2025-03-31 RX ADMIN — GUAIFENESIN 600 MG: 600 TABLET ORAL at 06:13

## 2025-03-31 RX ADMIN — ASPIRIN 81 MG: 81 TABLET, COATED ORAL at 08:26

## 2025-03-31 RX ADMIN — FLUTICASONE FUROATE AND VILANTEROL TRIFENATATE 1 PUFF: 200; 25 POWDER RESPIRATORY (INHALATION) at 08:00

## 2025-03-31 RX ADMIN — AZITHROMYCIN DIHYDRATE 250 MG: 250 TABLET, FILM COATED ORAL at 08:27

## 2025-03-31 ASSESSMENT — COGNITIVE AND FUNCTIONAL STATUS - GENERAL
MOBILITY SCORE: 24
DAILY ACTIVITIY SCORE: 24

## 2025-03-31 NOTE — CARE PLAN
The patient's goals for the shift include      The clinical goals for the shift include monitor for SOB

## 2025-03-31 NOTE — PROGRESS NOTES
03/31/25 0841   Discharge Planning   Living Arrangements Spouse/significant other   Support Systems Spouse/significant other   Expected Discharge Disposition Home   Does the patient need discharge transport arranged? No     Anticipate discharge soon.  Cardiology signed off.  Patient still requiring 02 . Patient is anxious for discharge.      No TCC/SW or PT/OT ordered. Please notify TCC if needs arise.

## 2025-03-31 NOTE — CARE PLAN
RT acknowledged order for Home O2 Eval. SpO2 93% on RA at rest. SpO2 as low as 91% on RA while ambulating. Pt does not qualify for Home O2 at this time.

## 2025-03-31 NOTE — CARE PLAN
Reviewed discharge instructions, all questions answered.   Problem: Pain - Adult  Goal: Verbalizes/displays adequate comfort level or baseline comfort level  Outcome: Adequate for Discharge     Problem: Safety - Adult  Goal: Free from fall injury  Outcome: Adequate for Discharge     Problem: Discharge Planning  Goal: Discharge to home or other facility with appropriate resources  Outcome: Adequate for Discharge     Problem: Chronic Conditions and Co-morbidities  Goal: Patient's chronic conditions and co-morbidity symptoms are monitored and maintained or improved  Outcome: Adequate for Discharge     Problem: Nutrition  Goal: Nutrient intake appropriate for maintaining nutritional needs  Outcome: Adequate for Discharge     Problem: Skin  Goal: Decreased wound size/increased tissue granulation at next dressing change  Outcome: Adequate for Discharge  Goal: Participates in plan/prevention/treatment measures  Outcome: Adequate for Discharge  Goal: Prevent/manage excess moisture  Outcome: Adequate for Discharge  Goal: Prevent/minimize sheer/friction injuries  Outcome: Adequate for Discharge  Goal: Promote/optimize nutrition  Outcome: Adequate for Discharge  Goal: Promote skin healing  Outcome: Adequate for Discharge     Problem: Fall/Injury  Goal: Not fall by end of shift  Outcome: Adequate for Discharge  Goal: Be free from injury by end of the shift  Outcome: Adequate for Discharge  Goal: Verbalize understanding of personal risk factors for fall in the hospital  Outcome: Adequate for Discharge  Goal: Verbalize understanding of risk factor reduction measures to prevent injury from fall in the home  Outcome: Adequate for Discharge  Goal: Use assistive devices by end of the shift  Outcome: Adequate for Discharge  Goal: Pace activities to prevent fatigue by end of the shift  Outcome: Adequate for Discharge     Problem: Respiratory  Goal: Clear secretions with interventions this shift  Outcome: Adequate for Discharge  Goal:  Minimize anxiety/maximize coping throughout shift  Outcome: Adequate for Discharge  Goal: Minimal/no exertional discomfort or dyspnea this shift  Outcome: Adequate for Discharge  Goal: No signs of respiratory distress (eg. Use of accessory muscles. Peds grunting)  Outcome: Adequate for Discharge  Goal: Patent airway maintained this shift  Outcome: Adequate for Discharge  Goal: Tolerate mechanical ventilation evidenced by VS/agitation level this shift  Outcome: Adequate for Discharge  Goal: Tolerate pulmonary toileting this shift  Outcome: Adequate for Discharge  Goal: Verbalize decreased shortness of breath this shift  Outcome: Adequate for Discharge  Goal: Wean oxygen to maintain O2 saturation per order/standard this shift  Outcome: Adequate for Discharge  Goal: Increase self care and/or family involvement in next 24 hours  Outcome: Adequate for Discharge     Problem: Pain  Goal: Takes deep breaths with improved pain control throughout the shift  Outcome: Adequate for Discharge  Goal: Turns in bed with improved pain control throughout the shift  Outcome: Adequate for Discharge  Goal: Walks with improved pain control throughout the shift  Outcome: Adequate for Discharge  Goal: Performs ADL's with improved pain control throughout shift  Outcome: Adequate for Discharge  Goal: Participates in PT with improved pain control throughout the shift  Outcome: Adequate for Discharge  Goal: Free from opioid side effects throughout the shift  Outcome: Adequate for Discharge  Goal: Free from acute confusion related to pain meds throughout the shift  Outcome: Adequate for Discharge

## 2025-03-31 NOTE — DISCHARGE SUMMARY
Discharge Diagnosis  Acute dyspnea    Issues Requiring Follow-Up  Acute on chronic congestive heart failure with systolic dysfunction  Diabetes mellitus type 2  Acute bronchitis  Lung nodules        Discharge Meds     Medication List      START taking these medications     azithromycin 250 mg tablet; Commonly known as: Zithromax; Take 2 tabs   (500 mg) by mouth today, then take 1 tab daily for 4 days.   nitroglycerin 0.4 mg SL tablet; Commonly known as: Nitrostat; Place 1   tablet (0.4 mg) under the tongue every 5 minutes if needed for chest pain.     CHANGE how you take these medications     furosemide 40 mg tablet; Commonly known as: Lasix; Take 1 tablet (40 mg)   by mouth once daily. Take 40 mg twice a day for 3 days, then take 40 mg   daily; What changed: medication strength, additional instructions     CONTINUE taking these medications     albuterol 90 mcg/actuation inhaler; Commonly known as: Ventolin HFA;   Inhale 2 puffs every 4 hours if needed for wheezing or shortness of   breath.   aspirin 81 mg EC tablet   atorvastatin 80 mg tablet; Commonly known as: Lipitor; Take 1 tablet (80   mg) by mouth once daily at bedtime.   Blood Glucose Test; Generic drug: blood sugar diagnostic; Test blood   sugar two times daily   budesonide-formoterol 160-4.5 mcg/actuation inhaler; Commonly known as:   Symbicort; Inhale 2 puffs 2 times a day. Rinse mouth with water after use   to reduce aftertaste and incidence of candidiasis. Do not swallow.   cholecalciferol 50 MCG (2000 UT) tablet; Commonly known as: Vitamin D-3;   Take 1 tablet (2,000 Units) by mouth once daily.   clopidogrel 75 mg tablet; Commonly known as: Plavix; Take 1 tablet (75   mg) by mouth once daily.   cyanocobalamin 250 mcg tablet; Commonly known as: Vitamin B-12   ezetimibe 10 mg tablet; Commonly known as: Zetia; Take 1 tablet (10 mg)   by mouth once daily at bedtime.   glipiZIDE 5 mg tablet; Commonly known as: Glucotrol; Take 1 tablet (5   mg) by mouth 2  times a day before meals.   isosorbide mononitrate ER 60 mg 24 hr tablet; Commonly known as: Imdur;   Take 1 tablet (60 mg) by mouth once daily at bedtime. Do not crush or   chew.   Jardiance 25 mg tablet; Generic drug: empagliflozin; Take 1 tablet (25   mg) by mouth once daily.   lisinopril 20 mg tablet; Take 1 tablet (20 mg) by mouth once daily.   metFORMIN 500 mg tablet; Commonly known as: Glucophage; TAKE TWO TABLETS   BY MOUTH TWO TIMES A DAY WITH MEALS   metoprolol succinate XL 25 mg 24 hr tablet; Commonly known as:   Toprol-XL; Take 1 tablet (25 mg) by mouth 2 times a day. Do not crush or   chew.   pantoprazole 20 mg EC tablet; Commonly known as: ProtoNix; Take 1 tablet   (20 mg) by mouth once daily in the morning. Take before meals. Do not   crush, chew, or split.       Test Results Pending At Discharge  Pending Labs       No current pending labs.            Hospital Course     Patient is a 54 years old male with past medical history of coronary artery disease status post CABG x 4 vessels, history of NSTEMI presented to [facility] ER complaining of shortness of breath.  Chest x-ray revealed cardiomegaly with mild pulmonary vascular congestion.  Patient was admitted to hospital for further evaluation treatment.  On physical exam patient had trace edema in both lower extremity, crackles in both lung fields.  He was treated for acute on chronic CHF with systolic dysfunction  Cardiology was consulted.  Patient has been on Lasix 40 mg daily at home.  He was started on IV Lasix 60 mg twice daily.  Initially he required oxygen patient was on 4 L oxygen.  He was weaned from the oxygen.  Patient is on room air and maintaining good oxygen saturation.  On admission patient had elevated troponin, started on heparin drip.  Heparin drip was discontinued after evaluated by cardiology.  2D echo done in hospital revealed ejection fraction 40 to 45%.  Patient see Dr. Schmidt in regularly basis.  I advised him to follow-up with  primary care physician in 2 weeks and Dr. Schmidt in 2 weeks.  From previous CT chest patient had lung nodules.  I advised him to follow-up with his primary care physician and I referred him to lung specialist to follow-up with CT chest regarding the lung nodules.  He is aware of risk and benefits if he does not follow-up.  Discussed with cardiology on the case.  Okay to discharge the patient home today.  Patient had acute bronchitis started on Zithromax.  Continue with Zithromax for 4 more days.  Dose of Lasix was adjusted to 40 mg twice daily for 3 days and continue 40 mg daily.  Patient is clinically hemodynamic stable to get discharged today.  Patient was advised regarding diet.  I advised him to be compliant with diet and medication.    Pertinent Physical Exam At Time of Discharge    Physical Exam  General: In non acute distress, cooperating during physical exam.  HEENT: Pupils are equal and reactive to light and commendation , oral mucosa moist, no JVD oral mucosa is moist.  Cardiovascular: Normal sinus rhythm, no MRG.  Lungs: Scattered crackles in both lower extremities .  abdomen: No hepatosplenomegaly appreciated, soft , not tender, positive bowel sounds, positive bowel movement.  Neuro: Alert and oriented x3, strength in upper and lower extremities , sensation intact.  Psych: Patient had great insight was going on  Musculoskeletal: Trace edema both lower extremities   vascular: Pulses are intact in upper and lower extremities  Skin: No petechiae, ecchymosis or other stigmata for dermatology disease.   Outpatient Follow-Up  Future Appointments   Date Time Provider Department Center   4/9/2025  3:00 PM CONC STRESS/ECHO LAB [id number] [place]   4/9/2025  3:00 PM GEA [id number] NONV1 ECG/H[facility] [id number] [place]   5/21/2025  3:40 PM Jossie Haney APRN-CNP [id number] [facility]   7/16/2025  3:15 PM Shashank Patel MD [id number] [facility]   10/1/2025  4:20 PM [place] Yousif-CNP [id number] [place]      Follow-up with cardiology, Dr. Schmidt in 2 weeks  Follow-up with PCP in 2 weeks  Follow-up with pulmonary in 2 weeks    Time spent discharging the patient 38 minutes        Nano Spear MD

## 2025-03-31 NOTE — NURSING NOTE
Patient consented last night to go on CPAP. Through out the night, his sats dropped down into the low 80's. He eventually took off the CPAP and refused to put it back on. His sats continue dropping; at one point, he was in the mid 60's. I have repeatedly asked him to wear his nasal cannula and he continues to refuse, stating he wore it all day yesterday and it didn't help.

## 2025-03-31 NOTE — PROGRESS NOTES
"Arnaldo Balderrama is a 54 y.o. male on day 2 of admission presenting with Acute dyspnea.    Subjective   Alert and oriented.  Denies complaint of chest pain or pressure palpitations or feeling of rapid heart rate.  States breathing has improved, however he states he is leaving the hospital today.       Objective     Physical Exam  Vitals and nursing note reviewed.   Constitutional:       General: He is not in acute distress.     Appearance: He is obese. He is not ill-appearing or toxic-appearing.   HENT:      Head: Normocephalic and atraumatic.      Nose: Nose normal.      Mouth/Throat:      Mouth: Mucous membranes are moist.      Pharynx: Oropharynx is clear.   Cardiovascular:      Pulses: Normal pulses.      Heart sounds: Normal heart sounds.   Pulmonary:      Effort: Pulmonary effort is normal.      Breath sounds: Normal breath sounds.      Comments: Diminished in bases.  No significant crackles.  Oxygen wean nasal cannula.  No conversational dyspnea  Abdominal:      General: There is distension.      Palpations: Abdomen is soft.   Musculoskeletal:         General: Normal range of motion.      Right lower leg: Edema present.      Left lower leg: Edema present.      Comments: +1 nonpitting edema to bilateral lower extremities.   Skin:     General: Skin is warm and dry.      Capillary Refill: Capillary refill takes less than 2 seconds.   Neurological:      Mental Status: He is alert and oriented to person, place, and time. Mental status is at baseline.   Psychiatric:         Mood and Affect: Mood normal.         Behavior: Behavior normal.         Thought Content: Thought content normal.         Judgment: Judgment normal.         Last Recorded Vitals  Blood pressure 135/76, pulse 81, temperature 36.6 °C (97.9 °F), temperature source Temporal, resp. rate 20, height 1.702 m (5' 7\"), weight 104 kg (229 lb 11.5 oz), SpO2 90%.  Intake/Output last 3 Shifts:  I/O last 3 completed shifts:  In: 512.3 (4.9 mL/kg) [P.O.:440; " I.V.:72.3 (0.7 mL/kg)]  Out: 4645 (44.6 mL/kg) [Urine:4645 (1.2 mL/kg/hr)]  Weight: 104.2 kg     Relevant Results  Results for orders placed or performed during the hospital encounter of 03/29/25 (from the past 24 hours)   POCT GLUCOSE   Result Value Ref Range    POCT Glucose 219 (H) 74 - 99 mg/dL   POCT GLUCOSE   Result Value Ref Range    POCT Glucose 180 (H) 74 - 99 mg/dL   POCT GLUCOSE   Result Value Ref Range    POCT Glucose 296 (H) 74 - 99 mg/dL   POCT GLUCOSE   Result Value Ref Range    POCT Glucose 118 (H) 74 - 99 mg/dL   Basic metabolic panel   Result Value Ref Range    Glucose 112 (H) 74 - 99 mg/dL    Sodium 135 (L) 136 - 145 mmol/L    Potassium 3.8 3.5 - 5.3 mmol/L    Chloride 99 98 - 107 mmol/L    Bicarbonate 27 21 - 32 mmol/L    Anion Gap 13 10 - 20 mmol/L    Urea Nitrogen 34 (H) 6 - 23 mg/dL    Creatinine 1.29 0.50 - 1.30 mg/dL    eGFR 66 >60 mL/min/1.73m*2    Calcium 8.9 8.6 - 10.3 mg/dL   Lavender Top   Result Value Ref Range    Extra Tube Hold for add-ons.    POCT GLUCOSE   Result Value Ref Range    POCT Glucose 97 74 - 99 mg/dL   POCT GLUCOSE   Result Value Ref Range    POCT Glucose 176 (H) 74 - 99 mg/dL     Transthoracic echo (TTE) complete    Result Date: 3/29/2025           [place] [address]            Phone [phone] TRANSTHORACIC ECHOCARDIOGRAM REPORT Patient Name:       ED Noe Physician:    [id number] Chandrakant Galvez DO Study Date:         3/29/2025            Ordering Provider:    [id number] BECCA DO MRN/PID:            [id number]             Fellow: Accession#:         [id number]         Nurse: Date of Birth/Age:  1970 / 54 years Sonographer:          Daisha Sheldon                                                                [facility] Gender Assigned at                      Additional Staff:  Birth: Height:             170.18 cm            Admit Date: Weight:             103.42 kg            Admission Status:     Inpatient -                                                                Routine BSA / BMI:          2.14 m2 / 35.71      Department Location:  [facility] Step                     kg/m2                                      Down Blood Pressure: 152 /88 mmHg Study Type:    TRANSTHORACIC ECHO (TTE) LIMITED Diagnosis/ICD: Dyspnea, unspecified-R06.00 Indication:    CHF, AC DYSPNEA CPT Codes:     Echo Limited-35720; Doppler Limited-46306; Color Doppler-61336 Patient History: Diabetes:          Yes Pertinent History: CHF, Dyspnea, HTN and NSTEMI, S/P CABG, H/O MI. Study Detail: The following Echo studies were performed: 2D, M-Mode, Doppler and               color flow. Definity used as a contrast agent for endocardial               border definition. Total contrast used for this procedure was 2 mL               via IV push.  PHYSICIAN INTERPRETATION: Left Ventricle: Left ventricular ejection fraction is mildly decreased, by visual estimate at 40-45%. Wall motion is abnormal. The left ventricular cavity size is moderately dilated. There is normal septal and normal posterior left ventricular wall thickness. Spectral Doppler shows a normal pattern of left ventricular diastolic filling. Global LV hypokinesis with severe inferobasal wall hypokinesis. Left Atrium: The left atrial size is normal. Right Ventricle: The right ventricle is normal in size. There is normal right ventricular global systolic function. Right Atrium: The right atrial size is normal. Aortic Valve: The aortic valve appears abnormal. There is mild aortic valve cusp calcification. There is mild aortic valve thickening. There is evidence of mildly elevated transaortic gradients consistent with sclerosis of the aortic valve. There is no evidence of aortic valve regurgitation. The peak instantaneous gradient of the aortic valve is 6 mmHg.  Mitral Valve: The mitral valve is abnormal. There is moderate to severe mitral annular calcification. There is no evidence of mitral valve regurgitation. Tricuspid Valve: The tricuspid valve is structurally normal. There is trace to mild tricuspid regurgitation. Pulmonic Valve: The pulmonic valve is structurally normal. There is no indication of pulmonic valve regurgitation. Pericardium: No pericardial effusion noted. Aorta: The aortic root is normal.  CONCLUSIONS:  1. Left ventricular ejection fraction is mildly decreased, by visual estimate at 40-45%.  2. Abnormal wall motion.  3. Left ventricular cavity size is moderately dilated.  4. There is normal right ventricular global systolic function.  5. There is moderate to severe mitral annular calcification.  6. Aortic valve sclerosis. QUANTITATIVE DATA SUMMARY:  2D MEASUREMENTS:           Normal Ranges: LAs:             3.60 cm   (2.7-4.0cm) IVSd:            0.64 cm   (0.6-1.1cm) LVPWd:           0.69 cm   (0.6-1.1cm) LVIDd:           4.84 cm   (3.9-5.9cm) LVIDs:           4.08 cm LV Mass Index:   47.5 g/m2 LV % FS          15.7 %  M-MODE MEASUREMENTS:         Normal Ranges: Ao Root:             2.20 cm (2.0-3.7cm)  LV SYSTOLIC FUNCTION:                      Normal Ranges: EF-Visual:      43 % LV EF Reported: 43 %  LV DIASTOLIC FUNCTION:           Normal Ranges: MV e'                  0.063 m/s (>8.0) MV lateral e'          0.06 m/s MV medial e'           0.06 m/s  AORTIC VALVE:           Normal Ranges: AoV Vmax:      1.18 m/s (<=1.7m/s) AoV Peak P.6 mmHg (<20mmHg) LVOT Max Nadir:  1.00 m/s (<=1.1m/s) LVOT Diameter: 1.80 cm  (1.8-2.4cm) AoV Area,Vmax: 2.15 cm2 (2.5-4.5cm2)  RIGHT VENTRICLE: RV s' 0.06 m/s  TRICUSPID VALVE/RVSP:          Normal Ranges: Peak TR Velocity:     1.95 m/s RV Syst Pressure:     18 mmHg  (< 30mmHg)  [id number] Chandrakant Galvez DO Electronically signed on 3/29/2025 at 11:26:35 AM  ** Final **     Transthoracic Echo (TTE) Complete    Result Date:  10/27/2024   [facility], [address] [address]               Tel [phone] and Fax [phone] TRANSTHORACIC ECHOCARDIOGRAM REPORT  Patient Name:      ED HORNE     Reading Physician:    [id number] Ash Lovelace MD Study Date:        10/21/2024           Ordering Provider:    [id number] BING R                                                               JAZMIN MRN/PID:           [id number]             Fellow: Accession#:        [id number]         Nurse:                Annie Ortega RN BSN Date of Birth/Age: 1970 / 54 years Sonographer:          Monica Cooney                                                               [facility] Gender:            M                    Additional Staff: Height:            170.18 cm            Admit Date:           10/20/2024 Weight:            99.79 kg             Admission Status:     Inpatient -                                                               Routine BSA / BMI:         2.11 m2 / 34.46      Encounter#:           [id number]                    kg/m2 Blood Pressure:    129/77 mmHg          Department Location:  [facility] Non                                                               Invasive Study Type:    TRANSTHORACIC ECHO (TTE) COMPLETE Diagnosis/ICD: Non ST elevation (NSTEMI) myocardial infarction-I21.4 Indication:    Acute Coronary Syndrome: Non-STEMI CPT Code:      Echo Complete w Full Doppler-[id number] Patient History: CABG:             Mulitivessel CABG. Diabetes:         Yes Pertinent         CAD, HTN, Dyspnea and Chest Pain. Elevated troponin, Elevated History:          BNP. Study Detail: The following Echo studies were performed: 2D, M-Mode, Doppler and               color flow. Technically challenging study due to poor acoustic               windows. Definity used as a contrast agent for endocardial  border               definition. Total contrast used for this procedure was 1 mL via IV               push.  PHYSICIAN INTERPRETATION: Left Ventricle: The left ventricular systolic function is moderately decreased, with a visually estimated ejection fraction of 35-40%. There is global hypokinesis of the left ventricle with minor regional variations. The left ventricular cavity size is normal. Spectral Doppler shows an abnormal pattern of left ventricular diastolic filling. Left Atrium: The left atrium is normal in size. Right Ventricle: The right ventricle was not well visualized. Right ventricular systolic function not assessed. Right Atrium: The right atrium is normal in size. Aortic Valve: The aortic valve is trileaflet. The aortic valve dimensionless index is 0.44. There is no evidence of aortic valve regurgitation. The peak instantaneous gradient of the aortic valve is 5.5 mmHg. The mean gradient of the aortic valve is 3.3 mmHg. Mitral Valve: The mitral valve is normal in structure. There is no evidence of mitral valve regurgitation. Tricuspid Valve: The tricuspid valve is structurally normal. Tricuspid regurgitation was not well visualized. Tricuspid regurgitation was not assessed. Pulmonic Valve: The pulmonic valve is structurally normal. There is trace pulmonic valve regurgitation. Pericardium: There is no pericardial effusion noted. Aorta: The aortic root was not well visualized.  CONCLUSIONS:  1. The left ventricular systolic function is moderately decreased, with a visually estimated ejection fraction of 35-40%.  2. There is global hypokinesis of the left ventricle with minor regional variations.  3. Spectral Doppler shows an abnormal pattern of left ventricular diastolic filling. QUANTITATIVE DATA SUMMARY:  2D MEASUREMENTS:          Normal Ranges: IVSd:            1.14 cm  (0.6-1.1cm) LVPWd:           1.12 cm  (0.6-1.1cm) LVIDd:           4.49 cm  (3.9-5.9cm) LVIDs:           3.51 cm LV Mass Index:   86  g/m2 LVEDV Index:     34 ml/m2 LV % FS          21.9 %  LA VOLUME:                    Normal Ranges: LA Vol A4C:        50.2 ml    (22+/-6mL/m2) LA Vol A2C:        51.8 ml LA Vol BP:         51.0 ml LA Vol Index A4C:  23.8 ml/m2 LA Vol Index A2C:  24.6 ml/m2 LA Vol Index BP:   24.2 ml/m2 LA Area A4C:       18.5 cm2 LA Area A2C:       18.8 cm2 LA Major Axis A4C: 5.8 cm LA Major Axis A2C: 5.8 cm LA Volume Index:   24.4 ml/m2 LA Vol A4C:        47.7 ml LA Vol A2C:        49.0 ml LA Vol Index BSA:  23.0 ml/m2  RA VOLUME BY A/L METHOD:          Normal Ranges: RA Area A4C:             15.7 cm2  M-MODE MEASUREMENTS:         Normal Ranges: Ao Root:             2.70 cm (2.0-3.7cm) LAs:                 5.03 cm (2.7-4.0cm)  LV SYSTOLIC FUNCTION BY 2D PLANIMETRY (MOD):                      Normal Ranges: EF-A4C View:    43 % (>=55%) EF-A2C View:    46 % EF-Biplane:     44 % EF-Visual:      38 % LV EF Reported: 38 %  LV DIASTOLIC FUNCTION:            Normal Ranges: MV Peak E:             0.85 m/s   (0.7-1.2 m/s) MV Peak A:             0.79 m/s   (0.42-0.7 m/s) E/A Ratio:             1.07       (1.0-2.2) MV e'                  0.070 m/s  (>8.0) MV lateral e'          0.08 m/s MV medial e'           0.06 m/s E/e' Ratio:            12.07      (<8.0) PulmV Sys Nadir:         44.11 cm/s PulmV King Nadir:        43.49 cm/s PulmV S/D Nadir:         1.01  MITRAL VALVE:          Normal Ranges: MV DT:        144 msec (150-240msec)  AORTIC VALVE:                     Normal Ranges: AoV Vmax:                1.17 m/s (<=1.7m/s) AoV Peak P.5 mmHg (<20mmHg) AoV Mean PG:             3.3 mmHg (1.7-11.5mmHg) LVOT Max Nadir:            0.57 m/s (<=1.1m/s) AoV VTI:                 26.89 cm (18-25cm) LVOT VTI:                11.92 cm LVOT Diameter:           2.01 cm  (1.8-2.4cm) AoV Area, VTI:           1.40 cm2 (2.5-5.5cm2) AoV Area,Vmax:           1.54 cm2 (2.5-4.5cm2) AoV Dimensionless Index: 0.44  RIGHT VENTRICLE: TAPSE: 12.0 mm RV s'   0.09 m/s  PULMONIC VALVE:          Normal Ranges: PV Max Nadir:     0.7 m/s  (0.6-0.9m/s) PV Max P.2 mmHg  Pulmonary Veins: PulmV King Nadir: 43.49 cm/s PulmV S/D Nadir:  1.01 PulmV Sys Nadir:  44.11 cm/s  [id number] Ash Lovelace MD Electronically signed on 10/27/2024 at 8:42:42 PM  ** Final **          Assessment/Plan   Assessment & Plan  Acute on chronic systolic congestive heart failure    Acute respiratory failure with hypoxia    NSTEMI (non-ST elevated myocardial infarction) (Multi)    Type 2 diabetes mellitus, without long-term current use of insulin (Multi)      Type I vs. Type II NSTEMI  Acute on Chronic Systolic Heart Failure (35-40%)  Acute Hypoxic Respiratory Failure  Coronary Artery Disease s/p CABG x4 (2017)  Hypertensive Disorder  Hyperlipidemia  Suspected Obstructive Sleep Apnea     Impression and Plan:     3/29: As described above.  Patient presenting with significant shortness of breath as well as intermittent chest discomfort.  High-sensitivity troponin I levels trended upward from 155-483.  He was started on heparin drip in the emergency department.  On my exam the patient is resting comfortably in bed.  Reports he still feeling short of breath at times but denies to current chest pain.  Currently in sinus rhythm on telemetry without significant ectopy.  Blood pressures mildly elevated with last recorded 140/81.  Remains on supplemental nasal cannula oxygen with pulse oximetry of 94%.  Regarding this patient's elevation high-sensitivity troponins, believe this is likely related to a type II injury related to the patient significant hypoxia on admission to the hospital.  However, given his significant cardiac history there is also some concern for a type I injury, thus I will continue his IV heparin drip for at least 24 hours.  As stated previously, he had a very recent cardiac catheterization in 2024 which revealed that all 4 of his bypass grafts were patent.  No plans for cardiac  catheterization today, and I will order the patient a cardiac diet.  Will continue diuresis with 40 mg of IV Lasix twice daily.  Appreciate attention to strict intakes outputs as well as daily weights.  Continue guideline directed medical therapy with metoprolol succinate 25 mg twice daily, lisinopril and Jardiance.  Continue dual antiplatelet therapy with Plavix and aspirin.  Continue high intensity statin.  Agree with obtaining echocardiogram this admission.  Will continue to follow the patient with you     3/30: Stable overnight.  Denies complaints chest pain or pressure palpitations or feeling of rapid heart rate.  The patient's heparin drip was discontinued yesterday and he remains chest pain-free.  States his breathing is minimally improved, however his current pulse ox on room air is 92 to 93%.  He states he is worried about his ability to return to his job tomorrow and his current state.  I's and O's are documented as a negative fluid 5 status of approximately 4000 mL over the past 24 hours.  Patient states he does not feel that he diuresed that much.  Most recent creatinine 0.95 with a potassium of 3.8.  Will increase IV diuresis from 40 mg twice daily to 60 mg twice daily.  Will recheck BMP in the morning.  Patient states he would like to discharge to home tonight or tomorrow morning.  I have requested that he stay at least till tomorrow morning to stabilize him further.  He appears agreeable with this at this time.  He continues on guideline directed therapy.  On telemetry monitor he made a sinus rhythm with no significant ectopy.  His blood pressure stable at 109/53.  Otherwise his echocardiogram yesterday returned with a marginally improved ejection fraction of 40 to 45% compared to previous of 35 to 40%.  Will follow with you.    3/31: No significant changes overnight.  Chest pain-free.  Breathing comfortably on nasal cannula oxygen.  Pulse ox is similar to previous with average trend of approximately  92.  Documented negative fluid volume status of 2857 mL over the past 24 hours.  His creatinine has remained stable at 1.29.  Potassium 3.8.  Despite the fact that the patient continues to require nasal cannula oxygen he states he is being discharged today.  He notes he must return to work.  From our perspective I would prefer if the patient stayed 1 or 2 days further, however he is unwilling.  For discharge have recommended the patient take 40 mg of furosemide twice daily for the next 3 days, and then reduce back to 40 mg daily.  Patient states understanding this.  He needs to follow-up with his cardiologist Dr. Schmidt within the next 1 to 2 weeks for reassessment to prevent rehospitalization.  Patient states understanding this.  Have also cautioned the patient to avoid sodium and excessive fluid intake.  Have recommended that he weigh himself daily and call Dr. Estevez office if he gains more than 2 or 3 pounds in 24 hours or 5 pounds in a week..  He should follow-up with cardiac rehab.  No further recommendations at this time.  Will sign off.    I spent 35 minutes in the professional and overall care of this patient.      Nayan Chavez, APRN-CNP

## 2025-04-01 ENCOUNTER — DOCUMENTATION (OUTPATIENT)
Dept: CARDIAC REHAB | Facility: HOSPITAL | Age: 55
End: 2025-04-01
Payer: COMMERCIAL

## 2025-04-01 ENCOUNTER — PATIENT OUTREACH (OUTPATIENT)
Dept: PRIMARY CARE | Facility: CLINIC | Age: 55
End: 2025-04-01
Payer: COMMERCIAL

## 2025-04-01 NOTE — PROGRESS NOTES
Discharge Facility: [facility]  Discharge Diagnosis: Acute dyspnea   Admission Date: 3/29/2025  Discharge Date: 3/31/2025    Issues Requiring Follow-Up   Acute on chronic congestive heart failure with systolic dysfunction  Diabetes mellitus type 2  Acute bronchitis  Lung nodules    PCP Appointment Date: Appointment with NATHAN Yousif (04/23/2025)   Specialist Appointment Date: Appointment (04/09/2025) Cardiology,  Appointment with NATHAN Weller (04/30/2025) Pulmonology  Hospital Encounter and Summary Linked: Yes  ED to Hosp-Admission (Discharged) with Nano Spear MD; Erik Roche MD; Bradley Tony DO (03/29/2025)     Two attempts were made to reach patient within two business days after discharge. Voicemail left with contact information for patient to call back with any non-emergent questions or concerns.

## 2025-04-01 NOTE — PROGRESS NOTES
Reviewed CHF education with patient's wife via phone (daily weights/low sodium diet). Discussed cardiac rehab. Encouraged patient's wife to call cardiac rehab for enrollment in the program. Cardiac rehab pamphlet/CHF book to be sent via mail.

## 2025-04-09 ENCOUNTER — HOSPITAL ENCOUNTER (OUTPATIENT)
Dept: CARDIOLOGY | Facility: CLINIC | Age: 55
Discharge: HOME | End: 2025-04-09
Payer: COMMERCIAL

## 2025-04-09 ENCOUNTER — APPOINTMENT (OUTPATIENT)
Dept: CARDIOLOGY | Facility: CLINIC | Age: 55
End: 2025-04-09
Payer: COMMERCIAL

## 2025-04-09 DIAGNOSIS — R00.2 HEART PALPITATIONS: ICD-10-CM

## 2025-04-12 NOTE — DOCUMENTATION CLARIFICATION NOTE
"    PATIENT:               ED HORNE  ACCT #:                  [id number]  MRN:                       [id number]  :                       1970  ADMIT DATE:       3/29/2025 2:22 AM  DISCH DATE:        3/31/2025 9:45 AM  RESPONDING PROVIDER #:        [id number]          PROVIDER RESPONSE TEXT:    Type 2 MI related to Acute on Chronic Systolic CHF    CDI QUERY TEXT:    Clarification      Instruction:    Based on your assessment of the patient and the clinical information, please provide the requested documentation by clicking on the appropriate radio button and enter any additional information if prompted.    Question: Please clarify the type of MI this patient had?    When answering this query, please exercise your independent professional judgment. The fact that a question is being asked, does not imply that any particular answer is desired or expected.    The patient's clinical indicators include:  Clinical Information:  54 year old amae presented with shortness of breath and chest pain.    Clinical Indicators:  Labs- Troponinn-  155, 483  Lab- BNP- 469    3/29- H/P- Past history- CAD/NSTEMI -Acute on chronic systolic congestive heart failure  Elevated BNP, chest x-ray showing some vascular congestion NSTEMI  Troponin escalating in pattern consistent with ACS, though could also be secondary to the hypoxemia. EKG reviewed; shows ST depressions in lateral leads but these were present on \".    3/29- Cardiology Consult- Dr. Galvez- \"This patient's troponin elevations are consistent with demand ischemia from congestive heart failure and hypertension.  He is on guideline directed medical therapy for an ischemic cardiomyopathy and I would increase his beta-blocker dose and lisinopril dose if necessary for more aggressive management of his heart failure and hypertension\".    3/31- DC Summary- \"Acute on chronic congestive heart failure with systolic dysfunction. On admission patient had elevated troponin, " "started on heparin drip.Heparin drip was discontinued after evaluated by cardiology.  2D echo done in hospital revealed ejection fraction 40 to 45%.\".    Treatment:  Cardiology Consult  Lasix 60mg IV BID  Dose of Lasix was adjusted to 40 mg twice daily for 3 days and continue 40 mg daily.  Avoid sodium and excessive fluid intake    Risk Factors:  History CAD, NSTEMI , Chronic Systolic CHF  Options provided:  -- Type 2 MI related to Acute on Chronic Systolic CHF  -- NSTEMI  -- Other - I will add my own diagnosis  -- Refer to Clinical Documentation Reviewer    Query created by: Alsie Hull on 4/7/2025 11:04 AM      Electronically signed by:  RITA ALONSO MD 4/12/2025 2:08 PM          "

## 2025-04-13 LAB
25(OH)D3+25(OH)D2 SERPL-MCNC: 57 NG/ML (ref 30–100)
ALBUMIN SERPL-MCNC: 4 G/DL (ref 3.6–5.1)
ALP SERPL-CCNC: 66 U/L (ref 35–144)
ALT SERPL-CCNC: 36 U/L (ref 9–46)
ANION GAP SERPL CALCULATED.4IONS-SCNC: 6 MMOL/L (CALC) (ref 7–17)
AST SERPL-CCNC: 24 U/L (ref 10–35)
BILIRUB SERPL-MCNC: 0.6 MG/DL (ref 0.2–1.2)
BUN SERPL-MCNC: 17 MG/DL (ref 7–25)
CALCIUM SERPL-MCNC: 9.1 MG/DL (ref 8.6–10.3)
CHLORIDE SERPL-SCNC: 105 MMOL/L (ref 98–110)
CO2 SERPL-SCNC: 28 MMOL/L (ref 20–32)
CREAT SERPL-MCNC: 0.85 MG/DL (ref 0.7–1.3)
EGFRCR SERPLBLD CKD-EPI 2021: 104 ML/MIN/1.73M2
GLUCOSE SERPL-MCNC: 133 MG/DL (ref 65–99)
HCV AB SERPL QL IA: NORMAL
HIV 1+2 AB+HIV1 P24 AG SERPL QL IA: NORMAL
POTASSIUM SERPL-SCNC: 4.9 MMOL/L (ref 3.5–5.3)
PROT SERPL-MCNC: 6.4 G/DL (ref 6.1–8.1)
SODIUM SERPL-SCNC: 139 MMOL/L (ref 135–146)
TSH SERPL-ACNC: NORMAL M[IU]/L
VIT B12 SERPL-MCNC: 699 PG/ML (ref 200–1100)

## 2025-04-14 LAB
25(OH)D3+25(OH)D2 SERPL-MCNC: 57 NG/ML (ref 30–100)
ALBUMIN SERPL-MCNC: 4 G/DL (ref 3.6–5.1)
ALP SERPL-CCNC: 66 U/L (ref 35–144)
ALT SERPL-CCNC: 36 U/L (ref 9–46)
ANION GAP SERPL CALCULATED.4IONS-SCNC: 6 MMOL/L (CALC) (ref 7–17)
AST SERPL-CCNC: 24 U/L (ref 10–35)
BILIRUB SERPL-MCNC: 0.6 MG/DL (ref 0.2–1.2)
BUN SERPL-MCNC: 17 MG/DL (ref 7–25)
CALCIUM SERPL-MCNC: 9.1 MG/DL (ref 8.6–10.3)
CHLORIDE SERPL-SCNC: 105 MMOL/L (ref 98–110)
CO2 SERPL-SCNC: 28 MMOL/L (ref 20–32)
CREAT SERPL-MCNC: 0.85 MG/DL (ref 0.7–1.3)
EGFRCR SERPLBLD CKD-EPI 2021: 104 ML/MIN/1.73M2
GLUCOSE SERPL-MCNC: 133 MG/DL (ref 65–99)
HCV AB SERPL QL IA: NORMAL
HIV 1+2 AB+HIV1 P24 AG SERPL QL IA: NORMAL
POTASSIUM SERPL-SCNC: 4.9 MMOL/L (ref 3.5–5.3)
PROT SERPL-MCNC: 6.4 G/DL (ref 6.1–8.1)
SODIUM SERPL-SCNC: 139 MMOL/L (ref 135–146)
TSH SERPL-ACNC: 2.47 MIU/L (ref 0.4–4.5)
VIT B12 SERPL-MCNC: 699 PG/ML (ref 200–1100)

## 2025-04-16 ENCOUNTER — PATIENT OUTREACH (OUTPATIENT)
Dept: PRIMARY CARE | Facility: CLINIC | Age: 55
End: 2025-04-16
Payer: COMMERCIAL

## 2025-04-16 NOTE — PROGRESS NOTES
Unable to reach patient for monthly post discharge follow up. LVM with call back number for patient to call if needed.

## 2025-04-23 ENCOUNTER — APPOINTMENT (OUTPATIENT)
Dept: PRIMARY CARE | Facility: CLINIC | Age: 55
End: 2025-04-23
Payer: COMMERCIAL

## 2025-04-23 VITALS
HEART RATE: 75 BPM | DIASTOLIC BLOOD PRESSURE: 84 MMHG | WEIGHT: 232.6 LBS | BODY MASS INDEX: 36.51 KG/M2 | HEIGHT: 67 IN | SYSTOLIC BLOOD PRESSURE: 131 MMHG | OXYGEN SATURATION: 93 %

## 2025-04-23 DIAGNOSIS — I50.23 ACUTE ON CHRONIC SYSTOLIC CONGESTIVE HEART FAILURE: ICD-10-CM

## 2025-04-23 DIAGNOSIS — Z09 HOSPITAL DISCHARGE FOLLOW-UP: Primary | ICD-10-CM

## 2025-04-23 PROCEDURE — 4010F ACE/ARB THERAPY RXD/TAKEN: CPT | Performed by: NURSE PRACTITIONER

## 2025-04-23 PROCEDURE — 3075F SYST BP GE 130 - 139MM HG: CPT | Performed by: NURSE PRACTITIONER

## 2025-04-23 PROCEDURE — 3079F DIAST BP 80-89 MM HG: CPT | Performed by: NURSE PRACTITIONER

## 2025-04-23 PROCEDURE — 3048F LDL-C <100 MG/DL: CPT | Performed by: NURSE PRACTITIONER

## 2025-04-23 PROCEDURE — 1036F TOBACCO NON-USER: CPT | Performed by: NURSE PRACTITIONER

## 2025-04-23 PROCEDURE — 3008F BODY MASS INDEX DOCD: CPT | Performed by: NURSE PRACTITIONER

## 2025-04-23 PROCEDURE — 99213 OFFICE O/P EST LOW 20 MIN: CPT | Performed by: NURSE PRACTITIONER

## 2025-04-23 PROCEDURE — 3051F HG A1C>EQUAL 7.0%<8.0%: CPT | Performed by: NURSE PRACTITIONER

## 2025-04-23 ASSESSMENT — PATIENT HEALTH QUESTIONNAIRE - PHQ9
SUM OF ALL RESPONSES TO PHQ9 QUESTIONS 1 AND 2: 0
1. LITTLE INTEREST OR PLEASURE IN DOING THINGS: NOT AT ALL
2. FEELING DOWN, DEPRESSED OR HOPELESS: NOT AT ALL

## 2025-04-23 NOTE — PATIENT INSTRUCTIONS
Thank you for seeing me today Arnaldo Balderrama, it was a pleasure to see you again!    Hospital discharge follow-up  DC summary reviewed  Meds reconciled        Acute on chronic systolic congestive heart failure  Check BNP today  Continue with Lasix daily  Get compression stockings 20-30 mm Hg and wear daily   Stop eating at [employer]  Weight loss encouraged   Orders:    B-type natriuretic peptide; Future        For assistance with scheduling referrals or consultations, please call [phone] or [phone].    For laboratory, radiology, and other tests, please call [phone] ([phone] for pediatrics).   For laboratory locations, please visit [email or web address]   If you do not get results within 7-10 days, or you have any questions or concerns, please send a message, call the office ([phone]), or return to the office for a follow-up appointment.     For acute/sick visits, if you are unable to get an office visit, you can do a  On Demand Virtual Visit that is accessible via your My Chart account.  For emergencies, call 9-1-1 or go to the nearest Emergency Department.     Please schedule additional appointment(s) to address concern(s) not addressed today.    Please review prescription inserts and published information for possible adverse effects of all medications.     In general, results are discussed over the phone or via  [email or web address].     You can see your health information, review clinical summaries from office visits & test results online when you follow your health with MY  Chart, a personal health record.   To sign up go to [email or web address].   If you need assistance with signing up or trouble getting into your account call [email or web address] Patient Line 24/7 at [phone]     [facility] AS SCHEDULED IN OCTOBER     ~Gavi Manuel NP

## 2025-04-23 NOTE — PROGRESS NOTES
"Patient: Arnaldo Balderrama  : 1970  PCP:   MRN: [id number]  Program: Transitional Care Management  Status: Enrolled  Effective Dates: 2025 - present  Responsible Staff: Merissa Dove RN  Social Drivers to be Addressed: Physical Activity, Social Connections, Stress         Arnaldo Balderrama is a 54 y.o. male presenting today for follow-up after being discharged from the [facility]  23  days ago.     The main problem requiring admission was DYSPNEA.     Dx: CAD w/CABG x 4, NSTEMI, HTN, CHF,     The discharge summary and/or Transitional Care Management documentation was reviewed. Medication reconciliation was performed as indicated via the \"Maurice as Reviewed\" timestamp.     Arnaldo Balderrama was contacted by Transitional Care Management services two days after his discharge. This encounter and supporting documentation was reviewed.    Pt had holter monitor placed 2 weeks ago, will take off today and mail back   He has appt on 25  He also has appt with Pulm next week, he will have chest CT repeated in 6 months     He still has SIMONS, it does vary, sometimes its with walking 25-50 feet and sometimes he can walk much farther before he starts to get SOB   He has 2+ BLE pitting edema  He is taking lasix 40 mg daily   BNP was > 400 last month, will recheck today     Review of Systems  ROS was completed and all systems are negative with the exception of what was noted in the the HPI.       /84   Pulse 75   Ht 1.702 m (5' 7\")   Wt 106 kg (232 lb 9.6 oz)   SpO2 93%   BMI 36.43 kg/m²     No visits with results within 2 Week(s) from this visit.   Latest known visit with results is:   Admission on 2025, Discharged on 2025   Component Date Value Ref Range Status    Ventricular Rate 2025 105  BPM Final    Atrial Rate 2025 105  BPM Final    [place] Interval 2025 136  ms Final    QRS Duration 2025 96  ms Final    QT Interval 2025 336  ms Final    QTC " Calculation(Bazett) 03/29/2025 444  ms Final    P Axis 03/29/2025 47  degrees Final    R Axis 03/29/2025 49  degrees Final    T Brickeys 03/29/2025 152  degrees Final    QRS Count 03/29/2025 17  beats Final    Q Onset 03/29/2025 223  ms Final    P Onset 03/29/2025 155  ms Final    P Offset 03/29/2025 208  ms Final    T Offset 03/29/2025 391  ms Final    QTC Fredericia 03/29/2025 404  ms Final    WBC 03/29/2025 10.9  4.4 - 11.3 x10*3/uL Final    nRBC 03/29/2025 0.0  0.0 - 0.0 /100 WBCs Final    RBC 03/29/2025 5.60  4.50 - 5.90 x10*6/uL Final    Hemoglobin 03/29/2025 15.7  13.5 - 17.5 g/dL Final    Hematocrit 03/29/2025 48.1  41.0 - 52.0 % Final    MCV 03/29/2025 86  80 - 100 fL Final    MCH 03/29/2025 28.0  26.0 - 34.0 pg Final    MCHC 03/29/2025 32.6  32.0 - 36.0 g/dL Final    RDW 03/29/2025 13.7  11.5 - 14.5 % Final    Platelets 03/29/2025 250  150 - 450 x10*3/uL Final    Neutrophils % 03/29/2025 63.6  40.0 - 80.0 % Final    Immature Granulocytes %, Automated 03/29/2025 0.7  0.0 - 0.9 % Final    Immature Granulocyte Count (IG) includes promyelocytes, myelocytes and metamyelocytes but does not include bands. Percent differential counts (%) should be interpreted in the context of the absolute cell counts (cells/UL).    Lymphocytes % 03/29/2025 26.1  13.0 - 44.0 % Final    Monocytes % 03/29/2025 7.6  2.0 - 10.0 % Final    Eosinophils % 03/29/2025 1.4  0.0 - 6.0 % Final    Basophils % 03/29/2025 0.6  0.0 - 2.0 % Final    Neutrophils Absolute 03/29/2025 6.95  1.20 - 7.70 x10*3/uL Final    Percent differential counts (%) should be interpreted in the context of the absolute cell counts (cells/uL).    Immature Granulocytes Absolute, Au* 03/29/2025 0.08  0.00 - 0.70 x10*3/uL Final    Lymphocytes Absolute 03/29/2025 2.85  1.20 - 4.80 x10*3/uL Final    Monocytes Absolute 03/29/2025 0.83  0.10 - 1.00 x10*3/uL Final    Eosinophils Absolute 03/29/2025 0.15  0.00 - 0.70 x10*3/uL Final    Basophils Absolute 03/29/2025 0.07  0.00 -  0.10 x10*3/uL Final    Magnesium 03/29/2025 1.23 (L)  1.60 - 2.40 mg/dL Final    Glucose 03/29/2025 152 (H)  74 - 99 mg/dL Final    Sodium 03/29/2025 137  136 - 145 mmol/L Final    Potassium 03/29/2025 3.8  3.5 - 5.3 mmol/L Final    Chloride 03/29/2025 104  98 - 107 mmol/L Final    Bicarbonate 03/29/2025 24  21 - 32 mmol/L Final    Anion Gap 03/29/2025 13  10 - 20 mmol/L Final    Urea Nitrogen 03/29/2025 16  6 - 23 mg/dL Final    Creatinine 03/29/2025 0.95  0.50 - 1.30 mg/dL Final    eGFR 03/29/2025 >90  >60 mL/min/1.73m*2 Final    Calculations of estimated GFR are performed using the 2021 CKD-EPI Study Refit equation without the race variable for the IDMS-Traceable creatinine methods.  https://jasn.asnjournals.org/content/early/2021/09/22/ASN.0253657605    Calcium 03/29/2025 8.9  8.6 - 10.3 mg/dL Final    Albumin 03/29/2025 4.0  3.4 - 5.0 g/dL Final    Alkaline Phosphatase 03/29/2025 75  33 - 120 U/L Final    Total Protein 03/29/2025 7.0  6.4 - 8.2 g/dL Final    AST 03/29/2025 21  9 - 39 U/L Final    Bilirubin, Total 03/29/2025 0.7  0.0 - 1.2 mg/dL Final    ALT 03/29/2025 29  10 - 52 U/L Final    Patients treated with Sulfasalazine may generate falsely decreased results for ALT.    BNP 03/29/2025 469 (H)  0 - 99 pg/mL Final    Troponin I, High Sensitivity 03/29/2025 155 (HH)  0 - 20 ng/L Final    POCT pH, Arterial 03/29/2025 7.40  7.38 - 7.42 pH Final    POCT pCO2, Arterial 03/29/2025 36 (L)  38 - 42 mm Hg Final    POCT pO2, Arterial 03/29/2025 139 (H)  85 - 95 mm Hg Final    POCT SO2, Arterial 03/29/2025 99  94 - 100 % Final    POCT Oxy Hemoglobin, Arterial 03/29/2025 96.8  94.0 - 98.0 % Final    POCT Hematocrit Calculated, Arteri* 03/29/2025 45.0  41.0 - 52.0 % Final    POCT Sodium, Arterial 03/29/2025 135 (L)  136 - 145 mmol/L Final    POCT Potassium, Arterial 03/29/2025 3.7  3.5 - 5.3 mmol/L Final    POCT Chloride, Arterial 03/29/2025 102  98 - 107 mmol/L Final    POCT Ionized Calcium, Arterial 03/29/2025 1.20   1.10 - 1.33 mmol/L Final    POCT Glucose, Arterial 03/29/2025 166 (H)  74 - 99 mg/dL Final    POCT Lactate, Arterial 03/29/2025 1.7  0.4 - 2.0 mmol/L Final    POCT Base Excess, Arterial 03/29/2025 -2.0  -2.0 - 3.0 mmol/L Final    POCT HCO3 Calculated, Arterial 03/29/2025 22.3  22.0 - 26.0 mmol/L Final    POCT Hemoglobin, Arterial 03/29/2025 15.1  13.5 - 17.5 g/dL Final    POCT Anion Gap, Arterial 03/29/2025 14  10 - 25 mmo/L Final    Patient Temperature 03/29/2025 37.0  degrees Celsius Final    FiO2 03/29/2025 50  % Final    Ventilator Mode 03/29/2025 CPAP   Final    Cpap 03/29/2025 10.0  cm H2O Final    Site of Arterial Puncture 03/29/2025 Radial Left   Final    Jamie's Test 03/29/2025 Positive   Final    Troponin I, High Sensitivity 03/29/2025 483 (HH)  0 - 20 ng/L Final    Previous result verified on 3/29/2025 0318 on specimen/case [id number] called with component [facility] for procedure Troponin I, High Sensitivity, Initial with value 155 ng/L.    aPTT 03/29/2025 24.3  22.0 - 32.5 seconds Final    Ventricular Rate 03/29/2025 96  BPM Final    Atrial Rate 03/29/2025 96  BPM Final    [place] Interval 03/29/2025 134  ms Final    QRS Duration 03/29/2025 92  ms Final    QT Interval 03/29/2025 356  ms Final    QTC Calculation(Bazett) 03/29/2025 449  ms Final    P Axis 03/29/2025 20  degrees Final    R Axis 03/29/2025 51  degrees Final    T [place] 03/29/2025 154  degrees Final    QRS Count 03/29/2025 16  beats Final    Q Onset 03/29/2025 222  ms Final    P Onset 03/29/2025 155  ms Final    P Offset 03/29/2025 206  ms Final    T Offset 03/29/2025 400  ms Final    QTC Fredericia 03/29/2025 416  ms Final    Protime 03/29/2025 11.5  9.3 - 12.7 seconds Final    INR 03/29/2025 1.1  0.9 - 1.2 Final    aPTT 03/29/2025 53.0 (H)  22.0 - 32.5 seconds Final    AV pk maria 03/29/2025 1.18  m/s Final    LVOT diam 03/29/2025 1.80  cm Final    LV EF 03/29/2025 43  % Final    RV free wall pk S' 03/29/2025 5.98  cm/s Final    RVSP 03/29/2025  18.2  mmHg Final    LVIDd 03/29/2025 4.84  cm Final    AV pk grad 03/29/2025 6  mmHg Final    Aortic Valve Area by Continuity of* 03/29/2025 2.15  cm2 Final    POCT Glucose 03/29/2025 164 (H)  74 - 99 mg/dL Final    POCT Glucose 03/29/2025 139 (H)  74 - 99 mg/dL Final    POCT Glucose 03/29/2025 130 (H)  74 - 99 mg/dL Final    POCT Glucose 03/29/2025 139 (H)  74 - 99 mg/dL Final    Cholesterol 03/30/2025 99  0 - 199 mg/dL Final          Age      Desirable   Borderline High   High     0-19 Y     0 - 169       170 - 199     >/= 200    20-24 Y     0 - 189       190 - 224     >/= 225         >24 Y     0 - 199       200 - 239     >/= 240   **All ranges are based on fasting samples. Specific   therapeutic targets will vary based on patient-specific   cardiac risk.    Pediatric guidelines reference:Pediatrics 2011, 128(S5).Adult guidelines reference: NCEP ATPIII Guidelines,DEMOND 2001, 258:2486-97    Venipuncture immediately after or during the administration of Metamizole may lead to falsely low results. Testing should be performed immediately prior to Metamizole dosing.    HDL-Cholesterol 03/30/2025 32.6  mg/dL Final      Age       Very Low   Low     Normal    High    0-19 Y    < 35      < 40     40-45     ----  20-24 Y    ----     < 40      >45      ----        >24 Y      ----     < 40     40-60      >60      Cholesterol/HDL Ratio 03/30/2025 3.0   Final      Ref Values  Desirable  < 3.4  High Risk  > 5.0    LDL Calculated 03/30/2025 47  <=99 mg/dL Final                                Near   Borderline      AGE      Desirable  Optimal    High     High     Very High     0-19 Y     0 - 109     ---    110-129   >/= 130     ----    20-24 Y     0 - 119     ---    120-159   >/= 160     ----      >24 Y     0 -  99   100-129  130-159   160-189     >/=190      VLDL 03/30/2025 19  0 - 40 mg/dL Final    Triglycerides 03/30/2025 96  0 - 149 mg/dL Final    Age              Desirable        Borderline         High        Very High  SEX:B            mg/dL             mg/dL               mg/dL      mg/dL  <=14D                       ----               ----        ----  15D-365D                    ----               ----        ----  1Y-9Y           0-74               75-99             >=100       ----  10Y-19Y        0-89                            >=130       ----  20Y-24Y        0-114             115-149             >=150      ----  >= 25Y         0-149             150-199             200-499    >=500      Venipuncture immediately after or during the administration of Metamizole may lead to falsely low results. Testing should be performed immediately prior to Metamizole dosing.    Non HDL Cholesterol 03/30/2025 66  0 - 149 mg/dL Final          Age       Desirable   Borderline High   High     Very High     0-19 Y     0 - 119       120 - 144     >/= 145    >/= 160    20-24 Y     0 - 149       150 - 189     >/= 190      ----         >24 Y    30 mg/dL above LDL Cholesterol goal      POCT Glucose 03/29/2025 239 (H)  74 - 99 mg/dL Final    Extra Tube 03/30/2025 Hold for add-ons.   Final    Auto resulted.    Extra Tube 03/30/2025 Hold for add-ons.   Final    Auto resulted.    POCT Glucose 03/30/2025 160 (H)  74 - 99 mg/dL Final    POCT Glucose 03/30/2025 166 (H)  74 - 99 mg/dL Final    Glucose 03/30/2025 150 (H)  74 - 99 mg/dL Final    Sodium 03/30/2025 135 (L)  136 - 145 mmol/L Final    Potassium 03/30/2025 4.0  3.5 - 5.3 mmol/L Final    Chloride 03/30/2025 99  98 - 107 mmol/L Final    Bicarbonate 03/30/2025 26  21 - 32 mmol/L Final    Anion Gap 03/30/2025 14  10 - 20 mmol/L Final    Urea Nitrogen 03/30/2025 21  6 - 23 mg/dL Final    Creatinine 03/30/2025 1.27  0.50 - 1.30 mg/dL Final    eGFR 03/30/2025 68  >60 mL/min/1.73m*2 Final    Calculations of estimated GFR are performed using the 2021 CKD-EPI Study Refit equation without the race variable for the IDMS-Traceable creatinine  methods.  https://jasn.asnjournals.org/content/early/ASN.3548092587    Calcium 2025 9.1  8.6 - 10.3 mg/dL Final    POCT Glucose 2025 219 (H)  74 - 99 mg/dL Final    POCT Glucose 2025 180 (H)  74 - 99 mg/dL Final    Glucose 2025 112 (H)  74 - 99 mg/dL Final    Sodium 2025 135 (L)  136 - 145 mmol/L Final    Potassium 2025 3.8  3.5 - 5.3 mmol/L Final    Chloride 2025 99  98 - 107 mmol/L Final    Bicarbonate 2025 27  21 - 32 mmol/L Final    Anion Gap 2025 13  10 - 20 mmol/L Final    Urea Nitrogen 2025 34 (H)  6 - 23 mg/dL Final    Creatinine 2025 1.29  0.50 - 1.30 mg/dL Final    eGFR 2025 66  >60 mL/min/1.73m*2 Final    Calculations of estimated GFR are performed using the  CKD-EPI Study Refit equation without the race variable for the IDMS-Traceable creatinine methods.  https://jasn.asnjournals.org/content/earlyASN.1870409063    Calcium 2025 8.9  8.6 - 10.3 mg/dL Final    POCT Glucose 2025 296 (H)  74 - 99 mg/dL Final    Extra Tube 2025 Hold for add-ons.   Final    Auto resulted.    POCT Glucose 2025 118 (H)  74 - 99 mg/dL Final    POCT Glucose 2025 97  74 - 99 mg/dL Final    POCT Glucose 2025 176 (H)  74 - 99 mg/dL Final     Physical Exam  Cardiovascular:      Pulses: Normal pulses.      Heart sounds: Normal heart sounds.   Pulmonary:      Effort: Pulmonary effort is normal.      Breath sounds: Normal breath sounds.   Musculoskeletal:      Right lower le+ Pitting Edema present.      Left lower le+ Pitting Edema present.   Neurological:      Mental Status: He is alert.         The complexity of medical decision making for this patient's transitional care is moderate.    Assessment/Plan   Assessment & Plan  Hospital discharge follow-up  DC summary reviewed  Meds reconciled        Acute on chronic systolic congestive heart failure  Check BNP today  Continue with Lasix daily  Get  compression stockings 20-30 mm Hg and wear daily   Stop eating at [employer]  Weight loss encouraged   Orders:    B-type natriuretic peptide; Future

## 2025-04-23 NOTE — ASSESSMENT & PLAN NOTE
Check BNP today  Continue with Lasix daily  Get compression stockings 20-30 mm Hg and wear daily   Stop eating at [employer]  Weight loss encouraged   Orders:    B-type natriuretic peptide; Future

## 2025-04-24 ENCOUNTER — TELEPHONE (OUTPATIENT)
Dept: PRIMARY CARE | Facility: CLINIC | Age: 55
End: 2025-04-24
Payer: COMMERCIAL

## 2025-04-24 DIAGNOSIS — I50.42 CHRONIC COMBINED SYSTOLIC AND DIASTOLIC CONGESTIVE HEART FAILURE: ICD-10-CM

## 2025-04-24 DIAGNOSIS — J81.0 FLASH PULMONARY EDEMA: ICD-10-CM

## 2025-04-24 LAB — BNP SERPL-MCNC: 417 PG/ML

## 2025-04-24 RX ORDER — FUROSEMIDE 40 MG/1
40 TABLET ORAL
Qty: 180 TABLET | Refills: 3 | Status: SHIPPED | OUTPATIENT
Start: 2025-04-24

## 2025-04-24 NOTE — TELEPHONE ENCOUNTER
Spoke with pt via phone  Informed him of BNP result and that I spoke with cardiology, CHARLES Mora and he is going to increase Lasix 40 mg to TWICE daily.  Pt verbalizes understanding at this time   No questions at this time  Has appt with cardiology next month

## 2025-04-24 NOTE — TELEPHONE ENCOUNTER
----- Message from Jossie Haney sent at 4/24/2025  2:03 PM EDT -----  Regarding: RE: Diuretic increase  Hi have him increase to 80 mg once a day We can call him and get him in sooner if you want Thanks  ----- Message -----  From: NATHAN Yousif  Sent: 4/24/2025   1:57 PM EDT  To: NATHAN Mcrae  Subject: Diuretic increase                                Ramez Lion was in my office yesterday, he was hospitalized in March, ? Acute on chronic HF He is taking lasix 40 mg but had 2+ pitting LE edema and I repeated his BNP which was not down much from hospital ([phone]---> [phone])He doesn't see you for a month so I wanted to see if we should increase the lasix or do something else?Gavi

## 2025-04-24 NOTE — TELEPHONE ENCOUNTER
Left pt a VM to call office to discuss BNP result and cardiologists suggestion to increase lasix to 80 mg daily

## 2025-04-30 ENCOUNTER — TELEPHONE (OUTPATIENT)
Dept: CARDIOLOGY | Facility: HOSPITAL | Age: 55
End: 2025-04-30

## 2025-04-30 ENCOUNTER — OFFICE VISIT (OUTPATIENT)
Dept: PULMONOLOGY | Facility: HOSPITAL | Age: 55
End: 2025-04-30
Payer: COMMERCIAL

## 2025-04-30 VITALS
RESPIRATION RATE: 18 BRPM | WEIGHT: 224.3 LBS | OXYGEN SATURATION: 94 % | BODY MASS INDEX: 35.13 KG/M2 | HEART RATE: 81 BPM | DIASTOLIC BLOOD PRESSURE: 63 MMHG | SYSTOLIC BLOOD PRESSURE: 125 MMHG | TEMPERATURE: 97.7 F

## 2025-04-30 DIAGNOSIS — R91.1 LUNG NODULE: Primary | ICD-10-CM

## 2025-04-30 DIAGNOSIS — R06.09 DOE (DYSPNEA ON EXERTION): ICD-10-CM

## 2025-04-30 DIAGNOSIS — I21.4 NSTEMI (NON-ST ELEVATED MYOCARDIAL INFARCTION) (MULTI): ICD-10-CM

## 2025-04-30 DIAGNOSIS — J98.4 RESTRICTIVE LUNG DISEASE: ICD-10-CM

## 2025-04-30 DIAGNOSIS — K21.9 GASTROESOPHAGEAL REFLUX DISEASE, UNSPECIFIED WHETHER ESOPHAGITIS PRESENT: ICD-10-CM

## 2025-04-30 PROCEDURE — 99213 OFFICE O/P EST LOW 20 MIN: CPT | Performed by: NURSE PRACTITIONER

## 2025-04-30 PROCEDURE — 4010F ACE/ARB THERAPY RXD/TAKEN: CPT | Performed by: NURSE PRACTITIONER

## 2025-04-30 PROCEDURE — 3078F DIAST BP <80 MM HG: CPT | Performed by: NURSE PRACTITIONER

## 2025-04-30 PROCEDURE — 3051F HG A1C>EQUAL 7.0%<8.0%: CPT | Performed by: NURSE PRACTITIONER

## 2025-04-30 PROCEDURE — 3048F LDL-C <100 MG/DL: CPT | Performed by: NURSE PRACTITIONER

## 2025-04-30 PROCEDURE — 3074F SYST BP LT 130 MM HG: CPT | Performed by: NURSE PRACTITIONER

## 2025-04-30 RX ORDER — ALBUTEROL SULFATE 0.83 MG/ML
2.5 SOLUTION RESPIRATORY (INHALATION) 4 TIMES DAILY PRN
Qty: 90 ML | Refills: 2 | Status: SHIPPED | OUTPATIENT
Start: 2025-04-30 | End: 2026-04-30

## 2025-04-30 ASSESSMENT — ENCOUNTER SYMPTOMS
WEAKNESS: 0
NUMBNESS: 0
DIZZINESS: 0
FEVER: 0
NERVOUS/ANXIOUS: 0
AGITATION: 0
SINUS PRESSURE: 0
EYE PAIN: 0
VOMITING: 0
ARTHRALGIAS: 0
PALPITATIONS: 0
HEADACHES: 0
NAUSEA: 0
DIARRHEA: 0
VOICE CHANGE: 0
MYALGIAS: 0
RHINORRHEA: 0
BACK PAIN: 0
JOINT SWELLING: 0
ABDOMINAL PAIN: 0
FATIGUE: 0

## 2025-04-30 ASSESSMENT — PAIN SCALES - GENERAL: PAINLEVEL_OUTOF10: 2

## 2025-04-30 NOTE — PATIENT INSTRUCTIONS
1. Abnormal CT chest: small area of infiltrate and small nodule-- exam stable. New areas of ground glass/ inflammation - no infectious symptoms at that time. CT 10/2024 during NSTEMI with GGO - likely related to edema. 2/2025 CT chest with resolution of GG  - will get CT chest in - 8/2025      2. Concern for GENESIS: excessive daytime sleepiness   - referral to sleep medicine at last visit -- declined     3. SIMONS: recent NSTEMI with reduced EF. Started on meds/ lasix by cardiology. PFTs without obstruction - restricted with BD resp.   - continue symbicort 160 2 puffs twice daily - rinse mouth out afterwards   - continue albuterol HFA inhaler 2 puffs or albuterol nebulizer treatment every 4-6 hours as needed for shortness of breath    - continue to follow with cardiology   - discussed reducing diet mountain dew intake - drinking 60oz of diet mountain dew a day       Thank you for visiting the Pulmonary clinic today!   Return to clinic 6 months after CT chest or sooner if needed   Jazlyn Blas CNP  My office -  [phone]- [place] is my . Ambika is my nurse [phone].   Radiology scheduling [phone]   Appointment scheduling [phone]   Pulmonary function testing - [phone]

## 2025-04-30 NOTE — PROGRESS NOTES
Patient: Arnadlo Balderrama    [id number]  : 1970 -- AGE 54 y.o.    Provider: PHOENIX Weller-CNP     Location [facility]   Service Date: 2025              [facility]  Follow up visit note      HISTORY OF PRESENT ILLNESS       HISTORY OF PRESENT ILLNESS   Mr. Balderrama is a 54 year old CM (never smoker) here for follow up for abnormal CT chest. Last seen in clinic on 3/12/25.      PCP: Dr. Manuel    Since last visit he was admitted in 3/2025 to [facility] - CXR with pulmonary vascular congestion and his diuresis was increased. He required 4L while inpatient, but was discharged on room air. Echo showed EF 40-45%. He was discharged on adjusted dose of furosemide and z pack for bronchitis. He has been using his symbicort twice daily and albuterol HFA none since the winter. He has a nebulizer machine at home, but does not have solution. He has SIMONS and will walk slower. He has a dry cough- maybe sometimes productive, but not often. He woke up with a coughing fit last night. He notices wheezing frequently - especially in the morning.  He denies any SOB at rest. He has intermittent chest pains - only lasts 5-30secs. Describes as a pain- squeezing, but no heaviness/ tightness/ pressure/ sharp pain. GERD is under good control. He has intermittent allergies - not bothersome enough to take anything for it. He has been taking furosemide 40mg BID. He has not noticed much improvement from the increased lasix. He sees cardiology in May - he sent in holter monitor last week. He states he has not been instructed on any fluid restriction. He does know his fluid intake goes up in the summer.      3/12/25: Since last visit he was able to get the symbicort - has been using twice daily. He has not noticed much difference.  He has only used it twice - when it was cold out. He did feel like it helped. He had a cough here and there, but not normally. He has been wheezing -  does not feel better with the symbicort. He has SIMONS with walking on level ground after a few minutes. He does have occasional chest pain here and there. He denies any SOB at rest. He has had some runny nose here and there. GERD under good control on pantoprazole. Mountain dew intake - was drinking 60oz a day.      11/26/24: Since last visit he feels he does not have the endurance/ longevity he did before. He ended up in the ED related to shortness of breath. He was admitted in 10/2024 for an NSTEMI - started on metoprolol/lasix - had been on  imdur (was supposed to be on jardiance, but had been out). He feels the lasix helped initially, but he feels his SIMONS has somewhat plateaued. He has a cough here and there. He has noticed some wheezing when he first wakes up. 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 He is able to walk, but not as fast as he used to. He denies any SOB at rest. He had occasional pain before he went into the hospital - it has been more constant since he has been in the hospital. He states it is on the left side of his chest - reproducible and feels like a sore muscle. He was on omeprazole - switched to pantoprazole, but does not seem like it lasts as long. He drinks 3 12 oz and then a 24 ox of mountain dew a day. He feels this may contribute to his GERD.  He has runny nose here and there.     5/1/24: Since last visit he continues to deny any respiratory symptoms. He denies any SOB at rest, cough, wheezing, or CP.  He states he had a little SIMONS yesterday - he was not sure if he over did it.  He denies any allergies. He feels his GERD is under good control on his PPI. Discussed walking more - currently working 80 hours a week at two jobs so feels he does not have time.     1/24/24: He denies any respiratory symptoms. He states he has SIMONS at times when he has a cold. He states his wife laying her head on his chest would cause him discomfort. He goes in/ out of cold  at work - can make his nose runny. He does not think he felt sick prior to his CT scan in Dec. He denies any cough, wheezing, SIMONS, SOB at rest, or CP.     12/21/22: Since last visit he hears himself wheezing once a while with a deep breath. Reports SIMONS with pushing himself too much and over exerting himself. Denies cough, SOB at rest, allergies and chest pain. GERD is under good control with omeprazole. He did not go for sleep study as recommended with last visit. He states he feels his day time tiredness is related to not getting enough sleep.     6/13/22 Since last visit He denies any wheezing, SIMONS, SOB at rest, GERD, or CP. A few weeks ago he had some runny nose and post nasal drip. he had a bit of a cough at that time that has since resolved.      3/30/22: He had chills, no fever, sweating, shortness of breath, and chest pain. They could not find anything wrong. He had just that day started his testosterone shots -- did at 4 and went to ED at 10. He and his wife think it was all related to his sugars being low -- he had orange juice and felt better. CS was 71 and then down to 67 - was able to drink some juice and then felt better. He did not have any changes to his DM medications. He is not sure if his testosterone could have effected his blood sugar. He has not had symptoms like this since then -- he did his next injection with eating pasta and did ok with it. He has some SIMONS with working out and at times with wearing a mask. He denies any coughing, wheezing, or SOB at rest. He has a little nasal congestion in the morning, but otherwise denies any respiratory symptoms. His GERD is under good control of omeprazole. He gets intermittent chest pains and has for several years. For some reason he has noticed it is worse in Feb/March and a few years ago he had a heart cath for similar symptoms. At that time he was told everything looked ok. He describes the pain as a twinge.      Previous pulmonary history: He has no  history of recurrent infections, or lung disease as a child. He had no previous lung hx, never on oxygen or inhaler therapy.      Inhalers/nebulized medications: none      Hospitalization History: He has not been hospitalized over the last year for breathing related problem.     Sleep history: He does not snore often. He never wakes up rested -- he doesn't sleep well. He dozes off often in the evening.        ALLERGIES AND MEDICATIONS     ALLERGIES  No Known Allergies    MEDICATIONS  Current Outpatient Medications   Medication Sig Dispense Refill    albuterol (Ventolin HFA) 90 mcg/actuation inhaler Inhale 2 puffs every 4 hours if needed for wheezing or shortness of breath. 8 g 5    aspirin 81 mg EC tablet Take 1 tablet (81 mg) by mouth once daily.      atorvastatin (Lipitor) 80 mg tablet Take 1 tablet (80 mg) by mouth once daily at bedtime. 90 tablet 3    blood sugar diagnostic (Blood Glucose Test) strip Test blood sugar two times daily 200 strip 3    budesonide-formoteroL (Symbicort) 160-4.5 mcg/actuation inhaler Inhale 2 puffs 2 times a day. Rinse mouth with water after use to reduce aftertaste and incidence of candidiasis. Do not swallow. 10.2 g 11    cholecalciferol (Vitamin D-3) 50 MCG (2000 UT) tablet Take 1 tablet (2,000 Units) by mouth once daily. 90 tablet 3    clopidogrel (Plavix) 75 mg tablet Take 1 tablet (75 mg) by mouth once daily. 90 tablet 3    cyanocobalamin (Vitamin B-12) 250 mcg tablet Take 1 tablet (250 mcg) by mouth once daily.      ezetimibe (Zetia) 10 mg tablet Take 1 tablet (10 mg) by mouth once daily at bedtime. 90 tablet 3    furosemide (Lasix) 40 mg tablet Take 1 tablet (40 mg) by mouth 2 times daily (morning and late afternoon). 180 tablet 3    glipiZIDE (Glucotrol) 5 mg tablet Take 1 tablet (5 mg) by mouth 2 times a day before meals. 60 tablet 6    isosorbide mononitrate ER (Imdur) 60 mg 24 hr tablet Take 1 tablet (60 mg) by mouth once daily at bedtime. Do not crush or chew. 90 tablet 3     Jardiance 25 mg Take 1 tablet (25 mg) by mouth once daily. 30 tablet 11    lisinopril 20 mg tablet Take 1 tablet (20 mg) by mouth once daily. 90 tablet 3    metFORMIN (Glucophage) 500 mg tablet TAKE TWO TABLETS BY MOUTH TWO TIMES A DAY WITH MEALS 360 tablet 3    metoprolol succinate XL (Toprol-XL) 25 mg 24 hr tablet Take 1 tablet (25 mg) by mouth 2 times a day. Do not crush or chew. 180 tablet 3    nitroglycerin (Nitrostat) 0.4 mg SL tablet Place 1 tablet (0.4 mg) under the tongue every 5 minutes if needed for chest pain. 90 tablet 12    pantoprazole (ProtoNix) 20 mg EC tablet Take 1 tablet (20 mg) by mouth once daily in the morning. Take before meals. Do not crush, chew, or split. 90 tablet 3     No current facility-administered medications for this visit.         PAST HISTORY     PAST MEDICAL HISTORY  - HTN   - CAD s/p CABG - 2017                                                                                 - HLD   - DMII                                              - GERD          - pancreatitis      - ? thyroid issues                              PAST SURGICAL HISTORY  Past Surgical History:   Procedure Laterality Date    CARDIAC CATHETERIZATION  03/2020    CARDIAC CATHETERIZATION N/A 10/21/2024    Procedure: Left Heart Cath, No LV, With Grafts;  Surgeon: Shine Schmidt MD;  Location: [facility];  Service: Cardiovascular;  Laterality: N/A;    CARDIAC SURGERY  2017    CORONARY ARTERY BYPASS GRAFT  01/16/2018    CABG    GALLBLADDER SURGERY  06/2023    OTHER SURGICAL HISTORY  12/08/2022    Cataract surgery       IMMUNIZATION HISTORY  Immunization History   Administered Date(s) Administered    COVID-19, mRNA, LNP-S, PF, 30 mcg/0.3 mL dose 03/17/2021, 04/14/2021, 10/26/2021    Flu vaccine (IIV4), preservative free *Check age/dose* 09/25/2018, 09/15/2021    Flu vaccine, quadrivalent, no egg protein, age 6 month or greater (FLUCELVAX) 09/23/2020, 10/07/2023    Flu vaccine, trivalent, preservative  free, age 6 months and greater (Fluarix/Fluzone/Flulaval) 10/03/2016, 10/10/2020, 12/18/2024    Influenza, Unspecified 10/10/2020, 09/03/2022    Influenza, injectable, quadrivalent 09/01/2019, 09/25/2019    Influenza, injectable, quadrivalent, preservative free, pediatric 09/20/2022    Influenza, trivalent, adjuvanted 09/12/2017    Pfizer COVID-19 vaccine, 12 years and older, (30mcg/0.3mL) (Comirnaty) 10/07/2023    Pneumococcal conjugate vaccine, 20-valent (PREVNAR 20) 12/18/2024    Tdap vaccine, age 7 year and older (BOOSTRIX, ADACEL) 06/23/2012, 02/13/2024    Zoster vaccine, recombinant, adult (SHINGRIX) 12/08/2021, 12/08/2022    Zoster, Unspecified 12/08/2021, 12/08/2022       SOCIAL HISTORY  smoking: significant 2nd hand smoke exposure from parents   drinking: none  illicit drug use: none     OCCUPATIONAL/ENVIRONMENTAL HISTORY  Occupation: Currently works in maintenance - machining, welding, insulation      FAMILY HISTORY  GENESIS - mother and two brothers   Lung cancer - father - surgery for removal, grand father, and uncle   COPD - Father     RESULTS/DATA     Pulmonary Function Test Results     11/21/24 - FEV1/FVC 0.89/ FEV1 2.06 (59% + BD resp)/ FVC 2.30 (52% + BD resp)/ TLC 4.30 (66%)/ DLCO 40 -> 96 corrected for volume     Chest Radiograph     XR chest 1 view   Impression  No acute finding.    [id number]      Chest CT Scan     CT chest:   - 5/9/22 - Stable size but with slightly reduced attenuation of a peribronchial focal ground-glass opacity within right upper lobe, measuring approximately 11 mm in average diameter. Although, the findings likely favor underlying inflammatory etiology, attention on follow-up CT scan chest within 6-9 months is recommended to assess stability/document resolution. No definite airspace consolidation, pleural effusion or pneumothorax. Esophagus is mildly patulous throughout its course and correlate with concern for gastroesophageal reflux versus motility disorder. Severe  coronary artery calcifications, status post CABG. Redemonstrated layering hyperdensity within the gallbladder,  likely representing gallbladder sludge versus stones. No CT evidence of acute cholecystitis.  - 12/12/22 Similar appearance of a right upper lobe ground-glass nodule as compared to 05/09/2022 and appears less conspicuous as compared to 02/02/2022.Pulmonary nodules detected on CT images. Nonspecific 4 mm ground-glass nodule the left lower lobe. No further follow-up is required, however, if the nodule morphology is suspicious, consider follow up at 2 and 4 years; if grows or increasingly solid, consider resection. Chronic findings as above.    ----------------------------------     [facility] No CT signs of pulmonary embolism, aortic aneurysm or aortic dissection Small right upper lobe infiltrate. Also there is a 3 mm left lower lobe noncalcified nodule. Follow-up of the infiltrate to complete resolution is needed to exclude underlying neoplasm             . Hyperdense material in the gallbladder either sludge or gallstones. Postthoracotomy changes  -----------  12/13/23 - IMPRESSION:  Mild patchy ground-glass opacities in the right upper lobe, some of  which are nodular appearance, progressed when compared to the  previous study as evidenced by an increase in their size and number.  New similar appearing mild patchy ground-glass opacities in the  lingula.  Interval resolution of the 4 mm ground-glass nodule within  the left lower lobe. Findings are nonspecific and may relate to an  infectious or inflammatory process but will require continued  surveillance.    - 4/24/24 - Improvement of right upper lobe airspace opacities presumed  inflammatory. Some residual focal bronchiectasis detected in the  right upper lobe not significantly changed.  2. New area of subtle ground-glass opacity seen in the left upper  lobe as well as a ground-glass nodule also new. These are highly  likely  inflammatory. Conservative management would be a follow-up in  12 months  3. Median sternotomy. Heart size is enlarged. Hepatic steatosis.  Vascular calcification.  4. Small sliding hiatal hernia.      10/20/24: No CT signs of pulmonary embolism.  2. Diffuse ground-glass infiltrates suspicious for pulmonary edema.  Correlate clinically. Suspected signs of CHF. Small bilateral pleural  effusions    2/3/25 - Interval resolution of diffuse ground-glass densities throughout  the lungs bilaterally.  2. Several small ground-glass density nodules in the right upper  lobe, not clearly seen on the prior study. Recommendation is for  follow-up examination in 3-6 months with CT of the chest.        Echocardiogram        Echo: 1/19/18 - EF 50-55% . Abnormal septal motion consistent with post-operative status. Spectral Doppler shows an impaired relaxation pattern of left ventricular diastolic filling. There is mild mitral and tricuspid regurgitation.    Echo: 10/21/24 -  The left ventricular systolic function is moderately decreased, with a visually estimated ejection fraction of 35-40%.   2. There is global hypokinesis of the left ventricle with minor regional variations.   3. Spectral Doppler shows an abnormal pattern of left ventricular diastolic filling.  RA normal size - RV not well visualized      3/29/25 - Left ventricular ejection fraction is mildly decreased, by visual estimate at 40-45%.   2. Abnormal wall motion.   3. Left ventricular cavity size is moderately dilated.   4. There is normal right ventricular global systolic function.   5. There is moderate to severe mitral annular calcification.   6. Aortic valve sclerosis.        [facility] - 10/21/24 - Left main: 100% distal stenosis.   2. RCA: 80% diffuse prox-mid disease.   3. 4/4 patent grafts: (1) LIMA - LAD (2) sequential SVG - OM2 - D1 (3) SVG -rPDA.   4. LVEDP 19mmHg, no aortic stenosis on LV-Ao gradient.     Labs/ Other testing        REVIEW OF SYSTEMS     REVIEW OF  SYSTEMS  Review of Systems   Constitutional:  Negative for fatigue and fever.   HENT:  Negative for congestion, postnasal drip, rhinorrhea, sinus pressure and voice change.    Eyes:  Negative for pain and visual disturbance.   Cardiovascular:  Negative for chest pain, palpitations and leg swelling.   Gastrointestinal:  Negative for abdominal pain, diarrhea, nausea and vomiting.   Endocrine: Negative for cold intolerance and heat intolerance.   Musculoskeletal:  Negative for arthralgias, back pain, joint swelling and myalgias.   Skin:  Negative for rash.   Neurological:  Negative for dizziness, weakness, numbness and headaches.   Psychiatric/Behavioral:  Negative for agitation. The patient is not nervous/anxious.          PHYSICAL EXAM     VITAL SIGNS: There were no vitals taken for this visit.     CURRENT WEIGHT: [unfilled]  BMI: [unfilled]  PREVIOUS WEIGHTS:  Wt Readings from Last 3 Encounters:   04/23/25 106 kg (232 lb 9.6 oz)   03/31/25 104 kg (229 lb 11.5 oz)   03/26/25 103 kg (228 lb)       Physical Exam  Vitals reviewed.   Constitutional:       General: He is not in acute distress.     Appearance: Normal appearance. He is obese. He is not ill-appearing or toxic-appearing.   HENT:      Head: Normocephalic.      Nose: No rhinorrhea.   Cardiovascular:      Rate and Rhythm: Normal rate and regular rhythm.      Heart sounds: Normal heart sounds.   Pulmonary:      Effort: Pulmonary effort is normal. No respiratory distress.      Breath sounds: Normal breath sounds. No stridor. No wheezing, rhonchi or rales.   Abdominal:      General: Abdomen is flat.   Musculoskeletal:         General: Normal range of motion.      Right lower leg: Edema present.      Left lower leg: Edema present.      Comments: Trace BLE edema    Skin:     General: Skin is warm and dry.      Nails: There is no clubbing.   Neurological:      General: No focal deficit present.      Mental Status: He is alert and oriented to person, place, and time.    Psychiatric:         Mood and Affect: Mood normal.         Behavior: Behavior normal.         Judgment: Judgment normal.       ASSESSMENT/PLAN       1. Abnormal CT chest: small area of infiltrate and small nodule-- exam stable. New areas of ground glass/ inflammation - no infectious symptoms at that time. CT 10/2024 during NSTEMI with GGO - likely related to edema. 2/2025 CT chest with resolution of GG  - will get CT chest in - 8/2025      2. Concern for GENESIS: excessive daytime sleepiness   - referral to sleep medicine at last visit -- declined     3. SIMONS: recent NSTEMI with reduced EF. Started on meds/ lasix by cardiology. PFTs without obstruction - restricted with BD resp.   - continue symbicort 160 2 puffs twice daily - rinse mouth out afterwards   - continue albuterol HFA inhaler 2 puffs or albuterol nebulizer treatment every 4-6 hours as needed for shortness of breath    - continue to follow with cardiology   - discussed reducing diet mountain dew intake - drinking 60oz of diet mountain dew a day       Thank you for visiting the Pulmonary clinic today!   Return to clinic 6 months after CT chest or sooner if needed   Jazlyn Blas CNP  My office -  [phone]- [place] is my . Ambika is my nurse [phone].   Radiology scheduling [phone]   Appointment scheduling [phone]   Pulmonary function testing - [phone]

## 2025-05-20 DIAGNOSIS — E11.42 TYPE 2 DIABETES MELLITUS WITH DIABETIC POLYNEUROPATHY, WITHOUT LONG-TERM CURRENT USE OF INSULIN: ICD-10-CM

## 2025-05-20 RX ORDER — GLIPIZIDE 5 MG/1
5 TABLET ORAL
Qty: 60 TABLET | Refills: 0 | Status: SHIPPED | OUTPATIENT
Start: 2025-05-20

## 2025-05-21 ENCOUNTER — OFFICE VISIT (OUTPATIENT)
Dept: CARDIOLOGY | Facility: HOSPITAL | Age: 55
End: 2025-05-21
Payer: COMMERCIAL

## 2025-05-21 VITALS
HEART RATE: 89 BPM | BODY MASS INDEX: 36.74 KG/M2 | WEIGHT: 234.57 LBS | SYSTOLIC BLOOD PRESSURE: 174 MMHG | DIASTOLIC BLOOD PRESSURE: 98 MMHG | OXYGEN SATURATION: 96 %

## 2025-05-21 DIAGNOSIS — I10 ESSENTIAL (PRIMARY) HYPERTENSION: Primary | ICD-10-CM

## 2025-05-21 DIAGNOSIS — I42.8 OTHER CARDIOMYOPATHY: ICD-10-CM

## 2025-05-21 DIAGNOSIS — R06.02 SHORTNESS OF BREATH: ICD-10-CM

## 2025-05-21 DIAGNOSIS — I10 ESSENTIAL HYPERTENSION, BENIGN: ICD-10-CM

## 2025-05-21 PROCEDURE — 99214 OFFICE O/P EST MOD 30 MIN: CPT | Performed by: NURSE PRACTITIONER

## 2025-05-21 PROCEDURE — 3048F LDL-C <100 MG/DL: CPT | Performed by: NURSE PRACTITIONER

## 2025-05-21 PROCEDURE — 3080F DIAST BP >= 90 MM HG: CPT | Performed by: NURSE PRACTITIONER

## 2025-05-21 PROCEDURE — 1036F TOBACCO NON-USER: CPT | Performed by: NURSE PRACTITIONER

## 2025-05-21 PROCEDURE — 3077F SYST BP >= 140 MM HG: CPT | Performed by: NURSE PRACTITIONER

## 2025-05-21 PROCEDURE — 3051F HG A1C>EQUAL 7.0%<8.0%: CPT | Performed by: NURSE PRACTITIONER

## 2025-05-21 PROCEDURE — 4010F ACE/ARB THERAPY RXD/TAKEN: CPT | Performed by: NURSE PRACTITIONER

## 2025-05-21 RX ORDER — CARVEDILOL 3.12 MG/1
3.12 TABLET ORAL
Qty: 60 TABLET | Refills: 11 | Status: SHIPPED | OUTPATIENT
Start: 2025-05-21 | End: 2026-05-21

## 2025-05-21 RX ORDER — METHYLPREDNISOLONE 4 MG/1
TABLET ORAL
Qty: 21 TABLET | Refills: 0 | Status: SHIPPED | OUTPATIENT
Start: 2025-05-21 | End: 2025-05-27

## 2025-05-21 ASSESSMENT — ENCOUNTER SYMPTOMS
HEMATOLOGIC/LYMPHATIC NEGATIVE: 1
MYALGIAS: 1
RESPIRATORY NEGATIVE: 1
EYES NEGATIVE: 1
PSYCHIATRIC NEGATIVE: 1
NEUROLOGICAL NEGATIVE: 1
GASTROINTESTINAL NEGATIVE: 1
CARDIOVASCULAR NEGATIVE: 1
CONSTITUTIONAL NEGATIVE: 1
ENDOCRINE NEGATIVE: 1

## 2025-05-21 NOTE — PATIENT INSTRUCTIONS
FOLLOW UP WITH ADVANCED HEART FAILURE   CALL WITH ANY QUESTIONS   START CARVEDILOL 3.125 MG TWICE A DAY   MEDROL DOSE PACK

## 2025-05-21 NOTE — PROGRESS NOTES
Referred by Dr. Aragon ref. provider found for No chief complaint on file.     History Of Present Illness:    Arnaldo Balderrama is a very pleasant 54 year old gentleman with a history of CAD s/p CABG x4, DM, HTN and HLD, he is here for a follow up visit. The patient is seen in collaboration with Dr. Schmidt. Mr. Balderrama felt worse on the Metoprolol. Felt more short of breath on the Metoprolol. Feels his heart rate is faster. Denies shortness of breath, occasional chest pressure. Notices wheezing in the morning.        Review of Systems   Constitutional: Negative.   HENT: Negative.     Eyes: Negative.    Cardiovascular: Negative.    Respiratory: Negative.     Endocrine: Negative.    Hematologic/Lymphatic: Negative.    Skin: Negative.    Musculoskeletal:  Positive for muscle weakness and myalgias.   Gastrointestinal: Negative.    Neurological: Negative.    Psychiatric/Behavioral: Negative.        Past Medical History:  He has a past medical history of Atherosclerosis of native coronary artery of native heart without angina pectoris, Chronic gout without tophus, unspecified cause, unspecified site, Hyperlipidemia, Hypothyroidism, unspecified (12/02/2020), Other fatigue (12/02/2020), Personal history of other diseases of the musculoskeletal system and connective tissue (09/24/2019), Personal history of other specified conditions (10/09/2018), and Type 2 diabetes mellitus with diabetic polyneuropathy, without long-term current use of insulin.    Past Surgical History:  He has a past surgical history that includes Coronary artery bypass graft (01/16/2018); Other surgical history (12/08/2022); Cardiac surgery (2017); Cardiac catheterization (03/2020); Gallbladder surgery (06/2023); and Cardiac catheterization (N/A, 10/21/2024).      Social History:  He reports that he has never smoked. He has never been exposed to tobacco smoke. He has never used smokeless tobacco. He reports current alcohol use. He reports that he does not  use drugs.    Family History:  Family History   Problem Relation Name Age of Onset    Thyroid disease Mother      Diabetes Father      Heart disease Father      Thyroid disease Father      Hypertension Sibling          Allergies:  Patient has no known allergies.    Outpatient Medications:  Current Outpatient Medications   Medication Instructions    albuterol (Ventolin HFA) 90 mcg/actuation inhaler 2 puffs, inhalation, Every 4 hours PRN    albuterol 2.5 mg, nebulization, 4 times daily PRN    aspirin 81 mg EC tablet 1 tablet, Daily    atorvastatin (LIPITOR) 80 mg, oral, Nightly    blood sugar diagnostic (Blood Glucose Test) strip Test blood sugar two times daily    budesonide-formoteroL (Symbicort) 160-4.5 mcg/actuation inhaler 2 puffs, inhalation, 2 times daily, Rinse mouth with water after use to reduce aftertaste and incidence of candidiasis. Do not swallow.    cholecalciferol (VITAMIN D-3) 2,000 Units, oral, Daily    clopidogrel (PLAVIX) 75 mg, oral, Daily    cyanocobalamin (VITAMIN B-12) 250 mcg, Daily    ezetimibe (ZETIA) 10 mg, oral, Nightly    furosemide (LASIX) 40 mg, oral, 2 times daily (morning and late afternoon)    glipiZIDE (GLUCOTROL) 5 mg, oral, 2 times daily before meals    isosorbide mononitrate ER (IMDUR) 60 mg, oral, Nightly, Do not crush or chew.    Jardiance 25 mg, oral, Daily    lisinopril 20 mg, oral, Daily    metFORMIN (GLUCOPHAGE) 1,000 mg, oral, 2 times daily (morning and late afternoon)    metoprolol succinate XL (TOPROL-XL) 25 mg, oral, 2 times daily, Do not crush or chew.    nitroglycerin (NITROSTAT) 0.4 mg, sublingual, Every 5 min PRN    pantoprazole (PROTONIX) 20 mg, oral, Daily before breakfast, Do not crush, chew, or split.        Last Recorded Vitals:  Vitals:    05/21/25 1531   BP: (!) 174/98   Pulse: 89   SpO2: 96%   Weight: 106 kg (234 lb 9.1 oz)           Physical Exam:  Physical Exam  Vitals reviewed.   HENT:      Head: Normocephalic.      Nose: Nose normal.   Eyes:      Pupils:  Pupils are equal, round, and reactive to light.   Cardiovascular:      Rate and Rhythm: Normal rate and regular rhythm.   Pulmonary:      Effort: Pulmonary effort is normal.      Breath sounds: Normal breath sounds.   Abdominal:      General: Abdomen is flat.      Palpations: Abdomen is soft.   Musculoskeletal:         General: Normal range of motion.      Cervical back: Normal range of motion.   Skin:     General: Skin is warm and dry.   Neurological:      General: No focal deficit present.      Mental Status: He is alert and oriented to person, place, and time.   Psychiatric:         Mood and Affect: Mood normal.            Last Labs:  CBC -  Lab Results   Component Value Date    WBC 10.9 03/29/2025    HGB 15.7 03/29/2025    HCT 48.1 03/29/2025    MCV 86 03/29/2025     03/29/2025       CMP -  Lab Results   Component Value Date    CALCIUM 9.1 04/12/2025    PHOS 4.4 10/21/2024    PROT 6.4 04/12/2025    ALBUMIN 4.0 04/12/2025    AST 24 04/12/2025    ALT 36 04/12/2025    ALKPHOS 66 04/12/2025    BILITOT 0.6 04/12/2025       LIPID PANEL -   Lab Results   Component Value Date    CHOL 99 03/30/2025    TRIG 96 03/30/2025    HDL 32.6 03/30/2025    CHHDL 3.0 03/30/2025    LDLF 94 09/24/2022    VLDL 19 03/30/2025    NHDL 66 03/30/2025       RENAL FUNCTION PANEL -   Lab Results   Component Value Date    GLUCOSE 133 (H) 04/12/2025     04/12/2025    K 4.9 04/12/2025     04/12/2025    CO2 28 04/12/2025    ANIONGAP 6 (L) 04/12/2025    BUN 17 04/12/2025    CREATININE 0.85 04/12/2025    GFRMALE >90 06/18/2022    CALCIUM 9.1 04/12/2025    PHOS 4.4 10/21/2024    ALBUMIN 4.0 04/12/2025        Lab Results   Component Value Date     (H) 04/23/2025    HGBA1C 7.4 (A) 01/15/2025       Last Cardiology Tests:  ECG:    Echo:  Echocardiogram 3/29/2025  . Left ventricular ejection fraction is mildly decreased, by visual estimate at 40-45%.   2. Abnormal wall motion.   3. Left ventricular cavity size is moderately  dilated.   4. There is normal right ventricular global systolic function.   5. There is moderate to severe mitral annular calcification.   6. Aortic valve sclerosis    Echocardiogram 10/21/2024   1. The left ventricular systolic function is moderately decreased, with a visually estimated ejection fraction of 35-40%.   2. There is global hypokinesis of the left ventricle with minor regional variations.   3. Spectral Doppler shows an abnormal pattern of left ventricular diastolic filling.    Echocardiogram 4/7/2022  1. The left ventricular systolic function is normal with a 55-60% estimated ejection fraction.  2. There is trace mitral and tricuspid regurgitation.    Echo 1/19/18:   1. The left ventricular systolic function is low normal with a 50-55% estimated  ejection fraction.   2. Abnormal septal motion consistent with post-operative status.   3. Spectral Doppler shows an impaired relaxation pattern of left ventricular  diastolic filling.   4. There is mild mitral and tricuspid regurgitation.    Echo 12/14/17:   1. The left ventricular systolic function is severely decreased with a 25-30%  estimated ejection fraction.   2. Poorly visualized anatomical structures due to suboptimal image quality.   3. Spectral Doppler shows a restrictive pattern of left ventricular diastolic  filling.   4. RVSP within normal limits.  Ejection Fractions:  LVEF 35-40%    Cath:  Heart cath 10/21/2024  1. Left main: 100% distal stenosis.   2. RCA: 80% diffuse prox-mid disease.   3. 4/4 patent grafts: (1) LIMA - LAD (2) sequential SVG - OM2 - D1 (3) SVG -rPDA.   4. LVEDP 19mmHg, no aortic stenosis on LV-Ao gradient.    Cath 2/21/2020:  1. Left main: 30% distal stenosis.  2. LAD: 95% diffuse prox-mid disease.  3. LCx: 90% diffuse prox-mid disease.  4. Ramus 80% proximal disease.  5. RCA: 80% diffuse prox-mid disease.  6. 4/4 patent grafts: (1) LIMA to LAD (2) sequential SVG to OM2-D1 (3) SVG to  rPDA.  7. LVEDP 9mmHg, no aortic stenosis  on LV-Ao gradient.    Stress Test:    Cardiac Imaging:      Assessment/Plan   Very pleasant 54-year-old gentleman with hypertension, diabetes, dyslipidemia, and coronary artery disease status post 4 vessel CABG in December 2017 (LIMA to LAD, SVG to PDA, SVG to OM, SVG to Diag attached proximally to zaman of OM graft). He was admitted 10/2024  with an NSTEMI, , left heart cath showed patent stents. Echo shows an LVEF 35-40%. He complains of shortness of breath and heart palpitations, he felt the Metoprolol was causing increased shortness of breath. He stopped the Metoprolol, shortness of breath improved. Heart monitor showed sinus rhythm.  Complains of shortness of breath, he will be started on medrol dose pack. He will be started on Carvedilol 3.125 mg twice a day to help with the palpitations. Heart rate and blood pressure are well controlled today. Repeat blood pressure 142/78      Plan   -call with any questions   -continue Aspirin 81 mg daily indefinitely and Plavix 75 mg daily   -continue Atorvastatin 80 mg daily, Zetia 10 mg daily, Imdur 60 mg daily and Lisinopril 20 mg daily   -continue Lasix to 40 mg twice a day   -continue Jardiance 25 mg daily  -start Carvedilol 3.125 mg twice a day   -medrol dose pack   -follow up with heart failure           Jossie Wild, [place]-CNP

## 2025-05-22 ENCOUNTER — PATIENT OUTREACH (OUTPATIENT)
Dept: CARE COORDINATION | Age: 55
End: 2025-05-22
Payer: COMMERCIAL

## 2025-06-20 LAB — BODY SURFACE AREA: 2.23 M2

## 2025-06-30 LAB — BODY SURFACE AREA: 2.23 M2

## 2025-07-02 ENCOUNTER — TELEPHONE (OUTPATIENT)
Dept: CARDIOLOGY | Facility: HOSPITAL | Age: 55
End: 2025-07-02
Payer: COMMERCIAL

## 2025-07-07 ENCOUNTER — OFFICE VISIT (OUTPATIENT)
Dept: CARDIOLOGY | Facility: CLINIC | Age: 55
End: 2025-07-07
Payer: COMMERCIAL

## 2025-07-07 VITALS
WEIGHT: 230 LBS | DIASTOLIC BLOOD PRESSURE: 77 MMHG | SYSTOLIC BLOOD PRESSURE: 136 MMHG | HEIGHT: 67 IN | HEART RATE: 76 BPM | BODY MASS INDEX: 36.1 KG/M2 | OXYGEN SATURATION: 97 %

## 2025-07-07 DIAGNOSIS — I21.4 NSTEMI (NON-ST ELEVATED MYOCARDIAL INFARCTION) (MULTI): ICD-10-CM

## 2025-07-07 DIAGNOSIS — I10 ESSENTIAL HYPERTENSION, BENIGN: ICD-10-CM

## 2025-07-07 DIAGNOSIS — I10 ESSENTIAL (PRIMARY) HYPERTENSION: ICD-10-CM

## 2025-07-07 DIAGNOSIS — I50.20 HFREF (HEART FAILURE WITH REDUCED EJECTION FRACTION): Primary | ICD-10-CM

## 2025-07-07 PROCEDURE — 99205 OFFICE O/P NEW HI 60 MIN: CPT | Performed by: STUDENT IN AN ORGANIZED HEALTH CARE EDUCATION/TRAINING PROGRAM

## 2025-07-07 PROCEDURE — 99212 OFFICE O/P EST SF 10 MIN: CPT

## 2025-07-07 PROCEDURE — 3078F DIAST BP <80 MM HG: CPT | Performed by: STUDENT IN AN ORGANIZED HEALTH CARE EDUCATION/TRAINING PROGRAM

## 2025-07-07 PROCEDURE — 3008F BODY MASS INDEX DOCD: CPT | Performed by: STUDENT IN AN ORGANIZED HEALTH CARE EDUCATION/TRAINING PROGRAM

## 2025-07-07 PROCEDURE — 1036F TOBACCO NON-USER: CPT | Performed by: STUDENT IN AN ORGANIZED HEALTH CARE EDUCATION/TRAINING PROGRAM

## 2025-07-07 PROCEDURE — 3075F SYST BP GE 130 - 139MM HG: CPT | Performed by: STUDENT IN AN ORGANIZED HEALTH CARE EDUCATION/TRAINING PROGRAM

## 2025-07-07 PROCEDURE — 3048F LDL-C <100 MG/DL: CPT | Performed by: STUDENT IN AN ORGANIZED HEALTH CARE EDUCATION/TRAINING PROGRAM

## 2025-07-07 PROCEDURE — 3051F HG A1C>EQUAL 7.0%<8.0%: CPT | Performed by: STUDENT IN AN ORGANIZED HEALTH CARE EDUCATION/TRAINING PROGRAM

## 2025-07-07 RX ORDER — EPLERENONE 25 MG/1
25 TABLET ORAL DAILY
Qty: 90 TABLET | Refills: 3 | Status: SHIPPED | OUTPATIENT
Start: 2025-07-07

## 2025-07-07 RX ORDER — SACUBITRIL AND VALSARTAN 49; 51 MG/1; MG/1
1 TABLET, FILM COATED ORAL 2 TIMES DAILY
Qty: 180 TABLET | Refills: 3 | Status: SHIPPED | OUTPATIENT
Start: 2025-07-07

## 2025-07-07 RX ORDER — CARVEDILOL 6.25 MG/1
6.25 TABLET ORAL
Qty: 180 TABLET | Refills: 3 | Status: SHIPPED | OUTPATIENT
Start: 2025-07-07 | End: 2026-07-07

## 2025-07-07 ASSESSMENT — PATIENT HEALTH QUESTIONNAIRE - PHQ9
2. FEELING DOWN, DEPRESSED OR HOPELESS: NOT AT ALL
SUM OF ALL RESPONSES TO PHQ9 QUESTIONS 1 AND 2: 0
1. LITTLE INTEREST OR PLEASURE IN DOING THINGS: NOT AT ALL

## 2025-07-07 ASSESSMENT — ENCOUNTER SYMPTOMS: DEPRESSION: 0

## 2025-07-07 NOTE — PROGRESS NOTES
Advanced Heart Failure Cardiology Clinic Visit  Date of Visit: 07/07/2025  1:30 PM EDT  Location of visit: [address]   Type of Visit: Heart Failure New  Last visit: Visit date not found    Primary Care Physician: NATHAN Yousif  Patient's Location: [facility] [id number]    HPI/Summary:    Arnaldo Balderrama is a very pleasant 54 y.o. male presenting for management of Stage C iCM/HFrEF (Last EF: 3/29/2025: 43%). He has a history of CAD s/p CABG x4 (2018), DM2, HLD, HTN, gout, hypothyroidism, and chronic respiratory symptoms with intermittent pulmonary findings.    Mr. Balderrama was hospitalized in October 2024 with NSTEMI, at which time LHC showed 100% distal LM stenosis, 80% RCA disease, and 4/4 patent bypass grafts. Echo revealed moderately reduced LVEF (35-40%). He was started on GDMT, including furosemide, metoprolol, and Imdur. He had persistent SIMONS and wheezing; metoprolol was discontinued due to dyspnea. Repeat echo in March 2025 showed mildly reduced EF (40-45%) with inferobasal hypokinesis and MAC. BNP was 417. He had a 5-day admission in March 2025 for volume overload, treated with increased diuretics and azithromycin. Pulmonary follow-up noted persistent SIMONS and wheeze, with stable CT chest and restrictive PFTs with BD response. Cardiac rhythm evaluation showed NSR with frequent PVCs.    Seen today in HF clinic for further GDMT optimization. Symptoms include exertional dyspnea and intermittent palpitations. /98, HR 89. Trace BLE edema noted. Restarted carvedilol 3.125mg BID. Currently on lisinopril, furosemide, carvedilol, SGLT2i, and high-intensity statin. No MRA yet. Weight trending upward. HF clinic follow-up scheduled.    EKG ( 3/29/25): NSR with frequent PVCs, abnormal ST/T, QTCB 449ms (my interpretation)  Echo (3/29/25): LVEF 40-45%, moderately dilated LV, inferobasal WMA, MAC, AV sclerosis   Cath (10/21/24): 100% distal LM, 80% proximal-mid RCA, 4/4 patent grafts, LVEDP  19mmHg  CT (2/3/25) - resolution of prior pulmonary edema; stable inflammatory nodules  History of Present Illness  Arnaldo Balderrama is a 54 year old male with heart failure and coronary artery disease who presents with persistent shortness of breath and fatigue. He is accompanied by his wife.    Since an episode in October identified as a heart attack with significant blockages, he has experienced persistent shortness of breath and fatigue. He underwent bypass grafts and was diagnosed with heart failure with reduced ejection fraction (35-40%). Despite treatment, he has difficulty walking more than 100 yards without needing to slow down.    In March, he was hospitalized for severe shortness of breath and treated with antibiotics for potential pneumonia and increased diuretics. He continues to experience shortness of breath and wheezing, particularly in the mornings. CT scans have shown changes consistent with fluid from heart failure.    He is currently on multiple medications including lisinopril 20 mg daily, carvedilol 3.125 mg twice daily, Jardiance 25 mg daily, Lasix 40 mg twice daily, Imdur 60 mg daily, aspirin 81 mg, atorvastatin 80 mg, and Zetia 10 mg daily. He has not needed nitroglycerin recently.    He works 60-80 hours a week and reports significant fatigue, often sleeping through days off. He notes a family history of heart issues, with his grandfather having had significant heart problems.    Objective   Medical History: Medical History[1]   Surgical Hx: Surgical History[2]   Social Hx: Social History[3]  Family Hx: Family History[4]   Allergies: RX Allergies[5]  Exam:       7/7/2025     1:20 PM 5/21/2025     3:31 PM 4/30/2025     3:07 PM 4/23/2025     2:30 PM 3/31/2025     8:02 AM 3/31/2025     4:57 AM   Vitals   Systolic 136 174 125 131 135 121   Diastolic 77 98 63 84 76 69   BP Location   Left arm  Left arm Left arm   Heart Rate 76 89 81 75  81   Temp   36.5 °C (97.7 °F)  36.6 °C (97.9 °F) 36.7 °C  "(98.1 °F)   Resp   18   20   Height 1.702 m (5' 7\")   1.702 m (5' 7\")     Weight (lb) 230 234.57 224.3 232.6  229.72   BMI 36.02 kg/m2 36.74 kg/m2 35.13 kg/m2 36.43 kg/m2  35.98 kg/m2   BSA (m2) 2.22 m2 2.24 m2 2.2 m2 2.24 m2  2.22 m2   Visit Report Report Report Report Report       Wt Readings from Last 10 Encounters:   07/07/25 104 kg (230 lb)   05/21/25 106 kg (234 lb 9.1 oz)   04/30/25 102 kg (224 lb 4.8 oz)   04/23/25 106 kg (232 lb 9.6 oz)   03/31/25 104 kg (229 lb 11.5 oz)   03/26/25 103 kg (228 lb)   03/26/25 104 kg (230 lb 2.6 oz)   03/12/25 103 kg (227 lb 4.7 oz)   01/15/25 108 kg (239 lb)   12/18/24 104 kg (230 lb 3.2 oz)     GEN: Pleasant, well-appearing, no acute distress.  HEENT: JVP not elevated, no icterus. No HJR  CHEST: No wheeze, good air movement.  CV: Normal rate, regular rhythm. Normal S1, inspiratory split S2, no m/r/g  ABD: Soft, ND, NT  EXT: Warm, well perfused, 1+ LE edema.   NEURO: Pleasant, Oriented to plan    Labs, reviewed  CMP:  Recent Labs     04/12/25  0940 03/31/25  0402 03/30/25  0432 03/29/25  0238 10/21/24  0613    135* 135* 137 137   K 4.9 3.8 4.0 3.8 4.0    99 99 104 99   CO2 28 27 26 24 29   ANIONGAP 6* 13 14 13 13   BUN 17 34* 21 16 12   CREATININE 0.85 1.29 1.27 0.95 0.97   EGFR 104 66 68 >90 >90   MG  --   --   --  1.23* 1.29*     Recent Labs     04/12/25  0940 03/29/25  0238 10/21/24  0613 10/20/24  0958 05/30/24  1730 06/12/23  1504 06/10/23  0424 06/09/23  0532   ALBUMIN 4.0 4.0 3.9 3.8 4.2   < > 3.2* 3.5   ALKPHOS 66 75  --  60 75   < > 57 63   ALT 36 29  --  30 28   < > 21 25   AST 24 21  --  22 14   < > 14 14   BILITOT 0.6 0.7  --  0.7 1.1   < > 0.6 0.9   LIPASE  --   --   --   --  31  --  35 34    < > = values in this interval not displayed.   CBC:  Recent Labs     03/29/25  0238 10/21/24  0613 10/20/24  0958 05/30/24  1730 12/29/23  1547   WBC 10.9 8.9 10.0 12.3* 9.8   HGB 15.7 15.5 15.0 15.9 15.6   HCT 48.1 46.0 44.7 46.0 46.1    262 274 286 284 " "  MCV 86 85 86 86 87   HEME/ENDO:  Recent Labs     04/12/25  0940 01/15/25  1605 10/01/24  1537 06/21/24  1559 12/29/23  1547 12/21/23  1431 09/28/19  0938 06/10/19  1601 11/13/18  1608 07/03/18  0855   TSH 2.47  --   --   --  2.97  --    < > 1.75   < > 3.03   FREET4  --   --   --   --   --   --   --  1.13  --  1.21   HGBA1C  --  7.4* 8.3* 7.5*  --  7.2*   < > 7.1   < > 6.8    < > = values in this interval not displayed.    CARDIAC:   Recent Labs     04/23/25  1456 03/29/25  0338 03/29/25  0238 10/20/24  1520 10/20/24  1059 10/20/24  0958 12/11/17  1539   TROPHS  --  483* 155* 349* 427* 514*  --    *  --  469*  --   --  521* 321*     Recent Labs     03/30/25  0432 10/20/24  1059 12/29/23  1547 06/12/23  1504 09/24/22  0905 06/18/22  0909 01/06/22  1633 12/05/20  0938   CHOL 99 117 122*   < > 151 143   < > 176   LDLF  --   --   --   --  94 81  --  116*   LDLCALC 47 68 64*   < >  --   --    < >  --    HDL 32.6 31.3 39.0*   < > 32.9* 33.5*   < > 44.8   TRIG 96 88 94   < > 120 143   < > 76    < > = values in this interval not displayed.   MICRO: No results for input(s): \"ESR\", \"CRP\", \"PROCAL\" in the last 54141 hours.No results found for the last 90 days.      Notable Studies, reviewed:   EKG:   Recent Labs     03/29/25  0444 03/29/25  0231 03/26/25  1341   VENTRATE 96 105 92   ATRRATE 96 105 92   QTCFRED 416 404 408   QRSDUR 92 96 86   QTCCALCB 449 444 437     Encounter Date: 03/29/25   ECG 12 lead   Result Value    Ventricular Rate 96    Atrial Rate 96    DE Interval 134    QRS Duration 92    QT Interval 356    QTC Calculation(Bazett) 449    P Axis 20    R Axis 51    T Axis 154    QRS Count 16    Q Onset 222    P Onset 155    P Offset 206    T Offset 400    QTC Fredericia 416    Narrative    Sinus rhythm with frequent Premature ventricular complexes  Cannot rule out Anterior infarct (cited on or before 26-MAR-2025)  ST & T wave abnormality, consider lateral ischemia  Abnormal ECG  When compared with ECG of " 29-MAR-2025 02:31, (unconfirmed)  Premature ventricular complexes are now Present  Confirmed by Hari Sage ([id number]) on 3/31/2025 8:41:38 AM     Echocardiogram:   Recent Labs     03/29/25  1022 10/21/24  0950   EF 43 38   LVIDD 4.84 4.49   RV 18.2  --    RVFRWALLPKSP 5.98 9.00   TAPSE  --  1.2   Transthoracic Echo (TTE) Limited With Doppler, Color And Contrast 03/29/2025    [place] [address]  Phone [phone]    TRANSTHORACIC ECHOCARDIOGRAM REPORT    Patient Name:       ED HORNE     Reading Physician:    [id number] Chandrakant Galvez DO  Study Date:         3/29/2025            Ordering Provider:    [id number] BECCA DO  MRN/PID:            [id number]             Fellow:  Accession#:         [id number]         Nurse:  Date of Birth/Age:  1970 / 54 years Sonographer:          Daisha Sheldon  [facility]  Gender Assigned at                      Additional Staff:  Birth:  Height:             170.18 cm            Admit Date:  Weight:             103.42 kg            Admission Status:     Inpatient -  Routine  BSA / BMI:          2.14 m2 / 35.71      Department Location:  [facility] Step  kg/m2                                      Down  Blood Pressure: 152 /88 mmHg    Study Type:    TRANSTHORACIC ECHO (TTE) LIMITED  Diagnosis/ICD: Dyspnea, unspecified-R06.00  Indication:    CHF, AC DYSPNEA  CPT Codes:     Echo Limited-41773; Doppler Limited-18149; Color Doppler-03209    Patient History:  Diabetes:          Yes  Pertinent History: CHF, Dyspnea, HTN and NSTEMI, S/P CABG, H/O MI.    Study Detail: The following Echo studies were performed: 2D, M-Mode, Doppler and  color flow. Definity used as a contrast agent for endocardial  border definition. Total contrast used for this procedure was 2 mL  via IV push.      PHYSICIAN INTERPRETATION:  Left Ventricle: Left ventricular ejection fraction is mildly decreased, by visual estimate at 40-45%. Wall motion is abnormal.  The left ventricular cavity size is moderately dilated. There is normal septal and normal posterior left ventricular wall thickness. Spectral Doppler shows a normal pattern of left ventricular diastolic filling. Global LV hypokinesis with severe inferobasal wall hypokinesis.  Left Atrium: The left atrial size is normal.  Right Ventricle: The right ventricle is normal in size. There is normal right ventricular global systolic function.  Right Atrium: The right atrial size is normal.  Aortic Valve: The aortic valve appears abnormal. There is mild aortic valve cusp calcification. There is mild aortic valve thickening. There is evidence of mildly elevated transaortic gradients consistent with sclerosis of the aortic valve.  There is no evidence of aortic valve regurgitation. The peak instantaneous gradient of the aortic valve is 6 mmHg.  Mitral Valve: The mitral valve is abnormal. There is moderate to severe mitral annular calcification. There is no evidence of mitral valve regurgitation.  Tricuspid Valve: The tricuspid valve is structurally normal. There is trace to mild tricuspid regurgitation.  Pulmonic Valve: The pulmonic valve is structurally normal. There is no indication of pulmonic valve regurgitation.  Pericardium: No pericardial effusion noted.  Aorta: The aortic root is normal.      CONCLUSIONS:  1. Left ventricular ejection fraction is mildly decreased, by visual estimate at 40-45%.  2. Abnormal wall motion.  3. Left ventricular cavity size is moderately dilated.  4. There is normal right ventricular global systolic function.  5. There is moderate to severe mitral annular calcification.  6. Aortic valve sclerosis.    QUANTITATIVE DATA SUMMARY:    2D MEASUREMENTS:           Normal Ranges:  LAs:             3.60 cm   (2.7-4.0cm)  IVSd:            0.64 cm   (0.6-1.1cm)  LVPWd:           0.69 cm   (0.6-1.1cm)  LVIDd:           4.84 cm   (3.9-5.9cm)  LVIDs:           4.08 cm  LV Mass Index:   47.5 g/m2  LV % FS           15.7 %      M-MODE MEASUREMENTS:         Normal Ranges:  Ao Root:             2.20 cm (2.0-3.7cm)      LV SYSTOLIC FUNCTION:  Normal Ranges:  EF-Visual:      43 %  LV EF Reported: 43 %      LV DIASTOLIC FUNCTION:           Normal Ranges:  MV e'                  0.063 m/s (>8.0)  MV lateral e'          0.06 m/s  MV medial e'           0.06 m/s      AORTIC VALVE:           Normal Ranges:  AoV Vmax:      1.18 m/s (<=1.7m/s)  AoV Peak P.6 mmHg (<20mmHg)  LVOT Max Nadir:  1.00 m/s (<=1.1m/s)  LVOT Diameter: 1.80 cm  (1.8-2.4cm)  AoV Area,Vmax: 2.15 cm2 (2.5-4.5cm2)      RIGHT VENTRICLE:  RV s' 0.06 m/s      TRICUSPID VALVE/RVSP:          Normal Ranges:  Peak TR Velocity:     1.95 m/s  RV Syst Pressure:     18 mmHg  (< 30mmHg)      [id number] Chandrakant Galvez DO  Electronically signed on 3/29/2025 at 11:26:35 AM        ** Final **    Coronary Angiography:   [facility] With Grafts 10/21/2024    [facility], Cath Lab, [address] [place] [address]    Cardiovascular Catheterization Report    Patient Name:      ED HORNE     Performing Physician:  [id number] Shine Schmidt MD  Study Date:        10/21/2024           Verifying Physician:   [id number] Schmidt MD  MRN/PID:           [id number]             Cardiologist/Co-Scrub:  Accession#:        [id number]         Ordering Provider:     [id number] BING WU  Date of Birth/Age: 1970 / 54 years Cardiologist:  Gender:            M                    Fellow:  Encounter#:        [id number]           Surgeon:      Study: Left Heart Cath with Grafts      Indications:  ED HORNE is a 54 year old male who presents with prior coronary artery bypass graft surgery, diabetes, dyslipidemia, hypertension and a chest pain assessment of atypical angina. NSTE - ACS.  Medical History:  LVEF Assessed: Yes. LVEF = 40-45%.      Procedure Description:  After infiltration with 2% Lidocaine, the right femoral artery was cannulated with a modified  Seldinger technique. Subsequently a 6 Khmer sheath was placed in the right femoral artery. Selective coronary catheterization was performed using a 6 Fr catheter(s) exchanged over a guide wire to cannulate the coronary arteries.  Multiple injections of contrast were made into the left and right coronary arteries with angiograms recorded in multiple projections. After completion of the procedure, femoral artery angiography was performed. This demonstrated a common femoral artery puncture appropriate for closure. An Angio-Seal VIP 6F ([facility] [facility] [employer]) vascular closure device was placed per protocol.    Coronary Angiography:  The coronary circulation is right dominant.    Left Main Coronary Artery:  The distal left main coronary artery showed 100% stenosis.    Right Coronary Artery Distribution:    The proximal to mid right coronary artery showed 80% stenosis.    Coronary Grafts:    LIMA Graft:  Left internal mammary artery graft conduit, originating in situ and attached to the distal left anterior descending, is patent.  Saphaneous Vein Graft(s):  The saphenous vein graft, originating from the aorta and attached to the 2nd obtuse marginal and 1st diagonal, is patent.  The 2nd saphenous vein graft, originating from the aorta and attached to the right posterior descending artery, is patent.    Coronary Lesion Summary:  Vessel      Stenosis    Vessel Segment  Left Main 100% stenosis     distal  RCA       80% stenosis  proximal to mid      Graft Stenosis Summary:  Graft         Destination of Graft               % Stenosis  LIMA  distal left anterior descending      no evidence of stenosis  SVG 1 2nd obtuse marginal and 1st diagonal no evidence of stenosis  SVG 2 right posterior descending artery    no evidence of stenosis      Hemo Personnel:  +-------------------+---------+  Name               Duty       +-------------------+---------+  Shine Schmidt MD  1  +-------------------+---------+      Hemodynamic Pressures:    +----+---------------+----------+-------------+--------------+-------+---------+  Site   Date Time   Phase NameSystolic mmHgDiastolic mmHgED mmHgMean mmHg  +----+---------------+----------+-------------+--------------+-------+---------+    AO     10/21/2024  AIR REST          130            64              89           1:18:21 PM                                                       +----+---------------+----------+-------------+--------------+-------+---------+    LV     10/21/2024  AIR REST          120             2     13                    1:20:52 PM                                                       +----+---------------+----------+-------------+--------------+-------+---------+    LV     10/21/2024  AIR REST          129             6     19                    1:21:03 PM                                                       +----+---------------+----------+-------------+--------------+-------+---------+   LVp     10/21/2024  AIR REST          128             6     19                    1:21:10 PM                                                       +----+---------------+----------+-------------+--------------+-------+---------+   AOp     10/21/2024  AIR REST          134            66              95           1:21:17 PM                                                       +----+---------------+----------+-------------+--------------+-------+---------+        Oxygen Saturation %:  +-----------+------------+  Sample SiteHB (g/100ml)  +-----------+------------+      SYS ART        13.5  +-----------+------------+      SYS LISA        13.5  +-----------+------------+      PUL ART        13.5  +-----------+------------+      PUL LISA        13.5  +-----------+------------+      Cardiac Cath Post Procedure Notes:  Post Procedure Diagnosis: 4/4  patent grafts.  Blood Loss:               Estimated blood loss during the procedure was 10cc  mls.  Specimens Removed:        Number of specimen(s) removed: none.      Recommendations:  Maximize medical therapy.    ____________________________________________________________________________________  CONCLUSIONS:  1. Left main: 100% distal stenosis.  2. RCA: 80% diffuse prox-mid disease.  3. 4/4 patent grafts: (1) LIMA - LAD (2) sequential SVG - OM2 - D1 (3) SVG -rPDA.  4. LVEDP 19mmHg, no aortic stenosis on LV-Ao gradient.    ICD 10 Codes:  Non ST elevation (NSTEMI) myocardial infarction-I21.4    CPT Codes:  Left Heart Cath Bypass Graft w ventriculography and coronary angio([facility])-25675; Moderate Sedation Services initial 15 minutes patient >5 years-63452; Moderate Sedation Services 1st additional 15 minutes patient >5 years-13930    [id number] Shine Schmidt MD  Performing Physician  Electronically signed by [id number] Shine Schmidt MD on 10/28/2024 at 2:02:20 PM          ** Final **      Right Heart Cath: No results found for this or any previous visit from the past 1800 days.    Cardiac Scoring: No results found for this or any previous visit from the past 1800 days.    Cardiac MRI: No results found for this or any previous visit from the past 1800 days.    Nuclear:No results found for this or any previous visit from the past 1800 days.    Metabolic Stress: No results found for this or any previous visit from the past 1800 days.    Vascular: No results found for this or any previous visit from the past 1800 days.      Last imgaging: Holter or Event Cardiac Monitor  Please refer to scanned document.    Current Outpatient Medications   Medication Instructions    albuterol (Ventolin HFA) 90 mcg/actuation inhaler 2 puffs, inhalation, Every 4 hours PRN    albuterol 2.5 mg, nebulization, 4 times daily PRN    aspirin 81 mg EC tablet 1 tablet, Daily    atorvastatin (LIPITOR) 80 mg, oral, Nightly    blood sugar diagnostic (Blood  Glucose Test) strip Test blood sugar two times daily    budesonide-formoteroL (Symbicort) 160-4.5 mcg/actuation inhaler 2 puffs, inhalation, 2 times daily, Rinse mouth with water after use to reduce aftertaste and incidence of candidiasis. Do not swallow.    carvedilol (COREG) 3.125 mg, oral, 2 times daily (morning and late afternoon)    cholecalciferol (VITAMIN D-3) 2,000 Units, oral, Daily    clopidogrel (PLAVIX) 75 mg, oral, Daily    cyanocobalamin (VITAMIN B-12) 250 mcg, Daily    ezetimibe (ZETIA) 10 mg, oral, Nightly    furosemide (LASIX) 40 mg, oral, 2 times daily (morning and late afternoon)    glipiZIDE (GLUCOTROL) 5 mg, oral, 2 times daily before meals    isosorbide mononitrate ER (IMDUR) 60 mg, oral, Nightly, Do not crush or chew.    Jardiance 25 mg, oral, Daily    lisinopril 20 mg, oral, Daily    metFORMIN (GLUCOPHAGE) 1,000 mg, oral, 2 times daily (morning and late afternoon)    nitroglycerin (NITROSTAT) 0.4 mg, sublingual, Every 5 min PRN    pantoprazole (PROTONIX) 20 mg, oral, Daily before breakfast, Do not crush, chew, or split.      Notable Therapies: *GDMT*   Class  Agent SAFETY    *ARNI* / ACE / ARB Lisinopril 20 mg daily  -- STOP Lisinopril, TWO DAYS AFTER STOPPING LISINOPRIL, START ENTRESTO. You will take Entresto 49-51mg two times a day. REMEMBER YOU CANNOT TAKE LISINOPRIL AND ENTRESTO WITHIN 2 DAYS OF EACH OTHER Last BP: 136/77, Last BUN/Cr (GFR): 4/12/2025: 17/4/12/2025: 0.85 (4/12/2025: 104)    *Beta-Blocker* Carvedilol 3.125 mg twice daily -> 6.25mg BID Last HR: 76    *MRA*  Start Eplerenone 25mg daily Last K: 4/12/2025: 4.9     *SGLT2* Jardiance 25 mg daily Last A1c 1/15/2025: 7.4     Diuretic Furosemide 40 mg twice daily Last BNP: 4/23/2025: 417    Hydralazine/ISDN Imdur 60 mg     Digoxin  Last Digoxin level: No results found for requested labs within last 3650 days.    Anticoagulation   Last Hgb: 3/29/2025: 15.7    Anti-arrhythmic   Last QTc: 3/29/2025: 449    Antiplatelet Aspirin 81 mg  daily Last Platelet: 3/29/2025: 250    Lipid-Lowering Atorvastatin 80 mg daily  Zetia 10mg daily Last Tchol 3/30/2025: 99 / LDL 9/24/2022: 94 or 3/30/2025: 47 / HDL 3/30/2025: 32.6 / TRIG 3/30/2025: 96    Other        Device(s)   EF: 3/29/2025: 43%, QRS: 3/29/2025: 92ms    Cardiac Rehab,   if EF <35/MI/OHS        Problems Addressed:   # HFrEF / Chronic Systolic Heart Failure, last EF 3/29/2025: 43%, dx'd 10/2024, Persistent elevated BNP: 4/23/2025: 417  # Ischemic Cardiomyopathy, ACC/AHA Stage C, NYHA 2, Warm, Hypervolemic.  # Hypertension: Last BP: 136/77.  # Hyperlipidemia: Last Tchol 3/30/2025: 99 / LDL 9/24/2022: 94 or 3/30/2025: 47 / HDL 3/30/2025: 32.6 / TRIG 3/30/2025: 96  # Type II Diabetes Mellitus: Last A1c 1/15/2025: 7.4. - continue metformin and glipizide  - on SGLT2i (Jardiance)  # Obesity: Body mass index is 36.02 kg/m².  - Optimize heart failure management with guideline-directed medical therapy.  - Switch lisinopril to Entresto 49/51 mg twice daily after 48-hour washout.  - Increase carvedilol to 6.25 mg twice daily.  - Start eplerenone for fluid retention and blood pressure management.  - Order lab work one week after starting eplerenone to monitor potassium levels.  - Refer to clinical pharmacy for medication management and patient assistance program.  - Order cardiac MRI in December to assess heart function and myocardial scarring.  - Continue atorvastatin 80 mg daily.  - Continue aspirin 81 mg daily.    Patient Instructions   - Continue current medications with the exception of:  -- increase carvedilol from 3.125mg twice daily  -- STOP Lisinopril, TWO DAYS AFTER STOPPING LISINOPRIL, START ENTRESTO. You will take Entresto 49-51 mg tablet, two times a day. REMEMBER YOU CANNOT TAKE LISINOPRIL AND ENTRESTO WITHIN 2 DAYS OF EACH OTHER   -- start eplerenone 25mg daily  - Labwork: 1 week after starting eplerenone  - Imaging/Procedures: Please obtain a Cardiac MRI in December Call [phone] to  schedule.  - Referrals:  --  Clinical Pharmacy (Cardiology): Please call [phone] to schedule an intake interview  - Followup:  -- HF clinic in 3 months       Orders:  No orders of the defined types were placed in this encounter.     Followup Appts:  Future Appointments   Date Time Provider Department Center   7/7/2025  1:30 PM Arnaldo Marie MD [id number] [facility]   7/16/2025  3:15 PM Shashank Patel MD [id number] [facility]   8/1/2025  1:00 PM [id number] [id number] [facility] [place]   10/1/2025  4:20 PM Aminata Manuel APRN-CNP [id number] [place]       MDM: High (high risk medical condition, independent test interpretation, medication management)  Time Spent: 74 min  The encounter involved a comprehensive review of extensive data, including prior medical records, imaging studies, laboratory results, and consultations, followed by in-depth counseling regarding the patient's complex cardiac condition and comorbidities. We discussed treatment options, risks and benefits, and recommendations for ongoing care and careful monitoring of adverse effects from medications.     ____________________________________________________________  Arnaldo Marie MD  Section of Advanced Heart Failure and Cardiac Transplantation  Division of Cardiovascular Medicine  [facility]         [1]   Past Medical History:  Diagnosis Date    Atherosclerosis of native coronary artery of native heart without angina pectoris     Chronic gout without tophus, unspecified cause, unspecified site     Hyperlipidemia     Hypothyroidism, unspecified 12/02/2020    Primary hypothyroidism    Other fatigue 12/02/2020    Tiredness    Personal history of other diseases of the musculoskeletal system and connective tissue 09/24/2019    History of gout    Personal history of other specified conditions 10/09/2018    History of nausea and vomiting    Type 2 diabetes mellitus with diabetic polyneuropathy,  without long-term current use of insulin    [2]   Past Surgical History:  Procedure Laterality Date    CARDIAC CATHETERIZATION  03/2020    CARDIAC CATHETERIZATION N/A 10/21/2024    Procedure: Left Heart Cath, No LV, With Grafts;  Surgeon: Shine Schmidt MD;  Location: [facility];  Service: Cardiovascular;  Laterality: N/A;    CARDIAC SURGERY  2017    CORONARY ARTERY BYPASS GRAFT  01/16/2018    CABG    GALLBLADDER SURGERY  06/2023    OTHER SURGICAL HISTORY  12/08/2022    Cataract surgery   [3]   Social History  Socioeconomic History    Marital status:    Tobacco Use    Smoking status: Never     Passive exposure: Never    Smokeless tobacco: Never   Vaping Use    Vaping status: Never Used   Substance and Sexual Activity    Alcohol use: Yes     Comment: SOCIAL    Drug use: Never    Sexual activity: Defer     Social Drivers of Health     Financial Resource Strain: Low Risk  (3/29/2025)    Overall Financial Resource Strain (CARDIA)     Difficulty of Paying Living Expenses: Not hard at all   Food Insecurity: No Food Insecurity (3/29/2025)    Hunger Vital Sign     Worried About Running Out of Food in the Last Year: Never true     Ran Out of Food in the Last Year: Never true   Transportation Needs: No Transportation Needs (3/29/2025)    PRAPARE - Transportation     Lack of Transportation (Medical): No     Lack of Transportation (Non-Medical): No   Intimate Partner Violence: Not At Risk (3/29/2025)    Humiliation, Afraid, Rape, and Kick questionnaire     Fear of Current or Ex-Partner: No     Emotionally Abused: No     Physically Abused: No     Sexually Abused: No   Housing Stability: Low Risk  (3/29/2025)    Housing Stability Vital Sign     Unable to Pay for Housing in the Last Year: No     Number of Times Moved in the Last Year: 0     Homeless in the Last Year: No   [4]   Family History  Problem Relation Name Age of Onset    Thyroid disease Mother      Diabetes Father      Heart disease Father       Thyroid disease Father      Hypertension Sibling     [5] No Known Allergies

## 2025-07-07 NOTE — PATIENT INSTRUCTIONS
If you have any questions or need cardiac medication refills, please call the Heart Failure office at [phone], option 6.  You may also contact the  Heart Failure Nursing team via email at [email or web address] (Please include your name and date of birth). To reach Dr. Marie's office please call [phone] / Fax: [phone].     If we placed a referral today for a specialist/procedure and you did not otherwise receive a phone number to schedule, please call the general scheduling line at [phone]. You may also schedule appointments on the [employer] [facility].     For radiology scheduling (Cardiac calcium score, CTA with HeartFlow, Cardiac MRI, NM cardiac stress test, PET viability study) the phone number is [phone].     To schedule an office appointment call [phone].      - Continue current medications with the exception of:  -- increase carvedilol from 3.125mg twice daily  -- STOP Lisinopril, TWO DAYS AFTER STOPPING LISINOPRIL, START ENTRESTO. You will take Entresto 49-51 mg tablet, two times a day. REMEMBER YOU CANNOT TAKE LISINOPRIL AND ENTRESTO WITHIN 2 DAYS OF EACH OTHER   -- start eplerenone 25mg daily  - Labwork: 1 week after starting eplerenone  - Imaging/Procedures: Please obtain a Cardiac MRI in December Call [phone] to schedule.  - Referrals:  --  Clinical Pharmacy (Cardiology): Please call [phone] to schedule an intake interview  - Followup:  -- HF clinic in 3 months

## 2025-07-08 DIAGNOSIS — E11.42 TYPE 2 DIABETES MELLITUS WITH DIABETIC POLYNEUROPATHY, WITHOUT LONG-TERM CURRENT USE OF INSULIN: ICD-10-CM

## 2025-07-08 RX ORDER — EMPAGLIFLOZIN 25 MG/1
25 TABLET, FILM COATED ORAL DAILY
Qty: 30 TABLET | Refills: 11 | Status: SHIPPED | OUTPATIENT
Start: 2025-07-08

## 2025-07-14 DIAGNOSIS — E11.42 TYPE 2 DIABETES MELLITUS WITH DIABETIC POLYNEUROPATHY, WITHOUT LONG-TERM CURRENT USE OF INSULIN: ICD-10-CM

## 2025-07-14 RX ORDER — GLIPIZIDE 5 MG/1
5 TABLET ORAL
Qty: 60 TABLET | Refills: 0 | Status: SHIPPED | OUTPATIENT
Start: 2025-07-14

## 2025-07-16 ENCOUNTER — APPOINTMENT (OUTPATIENT)
Dept: ENDOCRINOLOGY | Facility: CLINIC | Age: 55
End: 2025-07-16
Payer: COMMERCIAL

## 2025-07-16 VITALS
DIASTOLIC BLOOD PRESSURE: 73 MMHG | SYSTOLIC BLOOD PRESSURE: 110 MMHG | WEIGHT: 223 LBS | BODY MASS INDEX: 34.93 KG/M2 | HEART RATE: 69 BPM

## 2025-07-16 DIAGNOSIS — I10 ESSENTIAL HYPERTENSION, BENIGN: ICD-10-CM

## 2025-07-16 DIAGNOSIS — E11.42 TYPE 2 DIABETES MELLITUS WITH DIABETIC POLYNEUROPATHY, WITHOUT LONG-TERM CURRENT USE OF INSULIN: Primary | ICD-10-CM

## 2025-07-16 DIAGNOSIS — E78.5 DYSLIPIDEMIA: ICD-10-CM

## 2025-07-16 DIAGNOSIS — E53.8 VITAMIN B12 DEFICIENCY: ICD-10-CM

## 2025-07-16 LAB — POC HEMOGLOBIN A1C: 8.1 % (ref 4.2–6.5)

## 2025-07-16 PROCEDURE — 3078F DIAST BP <80 MM HG: CPT | Performed by: INTERNAL MEDICINE

## 2025-07-16 PROCEDURE — 3074F SYST BP LT 130 MM HG: CPT | Performed by: INTERNAL MEDICINE

## 2025-07-16 PROCEDURE — 99214 OFFICE O/P EST MOD 30 MIN: CPT | Performed by: INTERNAL MEDICINE

## 2025-07-16 PROCEDURE — 3052F HG A1C>EQUAL 8.0%<EQUAL 9.0%: CPT | Performed by: INTERNAL MEDICINE

## 2025-07-16 PROCEDURE — 1036F TOBACCO NON-USER: CPT | Performed by: INTERNAL MEDICINE

## 2025-07-16 PROCEDURE — 83036 HEMOGLOBIN GLYCOSYLATED A1C: CPT | Mod: QW | Performed by: INTERNAL MEDICINE

## 2025-07-16 ASSESSMENT — LIFESTYLE VARIABLES
HOW OFTEN DO YOU HAVE SIX OR MORE DRINKS ON ONE OCCASION: NEVER
SKIP TO QUESTIONS 9-10: 1
AUDIT-C TOTAL SCORE: 2
HOW OFTEN DO YOU HAVE A DRINK CONTAINING ALCOHOL: 2-4 TIMES A MONTH
HOW MANY STANDARD DRINKS CONTAINING ALCOHOL DO YOU HAVE ON A TYPICAL DAY: 1 OR 2

## 2025-07-16 ASSESSMENT — ENCOUNTER SYMPTOMS
LOSS OF SENSATION IN FEET: 0
DEPRESSION: 0
OCCASIONAL FEELINGS OF UNSTEADINESS: 0

## 2025-07-16 ASSESSMENT — PAIN SCALES - GENERAL: PAINLEVEL_OUTOF10: 0-NO PAIN

## 2025-07-16 NOTE — PROGRESS NOTES
HPI   53 yo with Diabetes 2 (dx in his 30's), HTN, Dyslipidemia, CVD s/p cabg , gout, presents for followup. A1c 7.4% last visit, today 8.1% (was out of glipizide).      Pt is testing sugars <1 times per day. Pt is having low sugars 0 times/week. Pt recalls low 100's when testing. Pt is following a carb controlled diet and knows reasonable carb allowances. Pt is able to afford their medications. Pt is active at work.           Taking metformin 500mger (4), jardiance 25mg, glipizide 5mg bid (was recently out)   -insulin is next therapy if needed    -no tzd, fluid retention concerns with cardiac history per previous notes     -as pt has no gallbladder, doesn't drink alcohol with a mild case of pancreatitis would simply hold trulicity/glp-1's as we have no other good cause   Patient had ER visit and diagnosed with abdominal pain/constipation/idiopathic acute pancreatitis without infection or necrosis (normal lipase) followed up by GI (05/2024).             Taking atorvastatin 40mg, zetia 10mg and tolerating.           Taking entresto, carvedilol 6.25mg bid, inspra 25mg,  imdur 30mg, lasix 40mg bid for htn/cvd.     Patient had ER visit and diagnosed with abdominal pain/constipation/idiopathic acute pancreatitis without infection or necrosis (normal lipase) followed up by GI (05/2024).          Current Outpatient Medications:     albuterol (Ventolin HFA) 90 mcg/actuation inhaler, Inhale 2 puffs every 4 hours if needed for wheezing or shortness of breath., Disp: 8 g, Rfl: 5    aspirin 81 mg EC tablet, Take 1 tablet (81 mg) by mouth once daily., Disp: , Rfl:     atorvastatin (Lipitor) 80 mg tablet, Take 1 tablet (80 mg) by mouth once daily at bedtime., Disp: 90 tablet, Rfl: 3    budesonide-formoteroL (Symbicort) 160-4.5 mcg/actuation inhaler, Inhale 2 puffs 2 times a day. Rinse mouth with water after use to reduce aftertaste and incidence of candidiasis. Do not swallow., Disp: 10.2 g, Rfl: 11    carvedilol (Coreg) 6.25 mg  tablet, Take 1 tablet (6.25 mg) by mouth 2 times daily (morning and late afternoon)., Disp: 180 tablet, Rfl: 3    cholecalciferol (Vitamin D-3) 50 MCG (2000 UT) tablet, Take 1 tablet (2,000 Units) by mouth once daily., Disp: 90 tablet, Rfl: 3    clopidogrel (Plavix) 75 mg tablet, Take 1 tablet (75 mg) by mouth once daily., Disp: 90 tablet, Rfl: 3    eplerenone (Inspra) 25 mg tablet, Take 1 tablet (25 mg) by mouth once daily., Disp: 90 tablet, Rfl: 3    ezetimibe (Zetia) 10 mg tablet, Take 1 tablet (10 mg) by mouth once daily at bedtime., Disp: 90 tablet, Rfl: 3    furosemide (Lasix) 40 mg tablet, Take 1 tablet (40 mg) by mouth 2 times daily (morning and late afternoon)., Disp: 180 tablet, Rfl: 3    glipiZIDE (Glucotrol) 5 mg tablet, Take 1 tablet (5 mg) by mouth 2 times a day before meals., Disp: 60 tablet, Rfl: 0    isosorbide mononitrate ER (Imdur) 60 mg 24 hr tablet, Take 1 tablet (60 mg) by mouth once daily at bedtime. Do not crush or chew., Disp: 90 tablet, Rfl: 3    Jardiance 25 mg tablet, Take 1 tablet (25 mg) by mouth once daily., Disp: 30 tablet, Rfl: 11    metFORMIN (Glucophage) 500 mg tablet, TAKE TWO TABLETS BY MOUTH TWO TIMES A DAY WITH MEALS, Disp: 360 tablet, Rfl: 3    pantoprazole (ProtoNix) 20 mg EC tablet, Take 1 tablet (20 mg) by mouth once daily in the morning. Take before meals. Do not crush, chew, or split., Disp: 90 tablet, Rfl: 3    sacubitriL-valsartan (Entresto) 49-51 mg tablet, Take 1 tablet by mouth 2 times a day. REMEMBER YOU CANNOT TAKE LISINOPRIL AND ENTRESTO WITHIN 2 DAYS OF EACH OTHER, Disp: 180 tablet, Rfl: 3    albuterol 2.5 mg /3 mL (0.083 %) nebulizer solution, Take 3 mL (2.5 mg) by nebulization 4 times a day as needed for wheezing or shortness of breath. (Patient not taking: Reported on 7/16/2025), Disp: 90 mL, Rfl: 2    blood sugar diagnostic (Blood Glucose Test) strip, Test blood sugar two times daily (Patient not taking: Reported on 7/16/2025), Disp: 200 strip, Rfl: 3     cyanocobalamin (Vitamin B-12) 250 mcg tablet, Take 1 tablet (250 mcg) by mouth once daily. (Patient not taking: Reported on 7/16/2025), Disp: , Rfl:     nitroglycerin (Nitrostat) 0.4 mg SL tablet, Place 1 tablet (0.4 mg) under the tongue every 5 minutes if needed for chest pain., Disp: 90 tablet, Rfl: 12      Allergies as of 07/16/2025    (No Known Allergies)         Review of Systems   Cardiology: Lightheadedness-denies.  Chest pain-denies.  Leg edema-denies.  Palpitations-denies.  Respiratory: Cough-denies. Shortness of breath +  Wheezing-denies.  Gastroenterology: Constipation-denies.  Diarrhea-denies.  Heartburn-denies.  Endocrinology: Cold intolerance-denies.  Heat intolerance-denies.  Sweats-denies.  Neurology: Headache-denies.  Tremor-denies.  Neuropathy in extremities-denies.  Psychology: Low energy-denies.  Irritability-denies.  Sleep disturbances-denies.      /73   Pulse 69   Wt 101 kg (223 lb)   BMI 34.93 kg/m²       Labs:  Lab Results   Component Value Date    WBC 10.9 03/29/2025    NRBC 0.0 03/29/2025    RBC 5.60 03/29/2025    HGB 15.7 03/29/2025    HCT 48.1 03/29/2025     03/29/2025     Lab Results   Component Value Date    CALCIUM 9.1 04/12/2025    AST 24 04/12/2025    ALKPHOS 66 04/12/2025    BILITOT 0.6 04/12/2025    PROT 6.4 04/12/2025    ALBUMIN 4.0 04/12/2025    GLOB 3.1 06/12/2023    AGR 1.3 (L) 06/12/2023     04/12/2025    K 4.9 04/12/2025     04/12/2025    CO2 28 04/12/2025    ANIONGAP 6 (L) 04/12/2025    BUN 17 04/12/2025    CREATININE 0.85 04/12/2025    UREACREAUR 12.5 06/12/2023    GLUCOSE 133 (H) 04/12/2025    ALT 36 04/12/2025    EGFR 104 04/12/2025     Lab Results   Component Value Date    CHOL 99 03/30/2025    TRIG 96 03/30/2025    HDL 32.6 03/30/2025    LDLCALC 47 03/30/2025     Lab Results   Component Value Date    MICROALBCREA  12/29/2023      Comment:      One or more analytes used in this calculation is outside of the analytical measurement range.  Calculation cannot be performed.     Lab Results   Component Value Date    TSH 2.47 04/12/2025     Lab Results   Component Value Date    VPUZDZPL22 699 04/12/2025     Lab Results   Component Value Date    HGBA1C 8.1 (A) 07/16/2025         Physical Exam   General Appearance: pleasant, cooperative, no acute distress  HEENT: no chemosis, no proptosis, no lid lag, no lid retraction  Neck: no lymphadenopathy, no thyromegaly, no dominant thyroid nodules  Heart: no murmurs, regular rate and rhythm, S1 and S2  Lungs: no wheezes, no rhonci, no rales  Extremities: no lower extremity swelling      Assessment/Plan   1. Type 2 diabetes mellitus with diabetic polyneuropathy, without long-term current use of insulin (Primary)  -A1c ordered and reviewed  -glycemic values reviewed  -labs reviewed    -work on testing sugars daily at different times of day, cgm not covered  -next move is increasing dodson or adding insulin    2. Dyslipidemia  -on statin at target ldl<55    3. Essential hypertension, benign  -at target, working with cardiology on chf regiment    4. Vitamin B12 deficiency           Follow Up:  Cde 3pm visit    -labs/tests/notes reviewed  -reviewed and counseled patient on medication monitoring and side effects

## 2025-07-21 ENCOUNTER — PATIENT OUTREACH (OUTPATIENT)
Dept: CARE COORDINATION | Age: 55
End: 2025-07-21
Payer: COMMERCIAL

## 2025-07-22 LAB
ANION GAP SERPL CALCULATED.4IONS-SCNC: 13 MMOL/L (CALC) (ref 7–17)
BNP SERPL-MCNC: 175 PG/ML
BUN SERPL-MCNC: 24 MG/DL (ref 7–25)
BUN/CREAT SERPL: ABNORMAL (CALC) (ref 6–22)
CALCIUM SERPL-MCNC: 9.1 MG/DL (ref 8.6–10.3)
CHLORIDE SERPL-SCNC: 100 MMOL/L (ref 98–110)
CO2 SERPL-SCNC: 23 MMOL/L (ref 20–32)
CREAT SERPL-MCNC: 1.18 MG/DL (ref 0.7–1.3)
EGFRCR SERPLBLD CKD-EPI 2021: 74 ML/MIN/1.73M2
ERYTHROCYTE [DISTWIDTH] IN BLOOD BY AUTOMATED COUNT: 14.5 % (ref 11–15)
EST. AVERAGE GLUCOSE BLD GHB EST-MCNC: 200 MG/DL
EST. AVERAGE GLUCOSE BLD GHB EST-SCNC: 11.1 MMOL/L
FERRITIN SERPL-MCNC: 49 NG/ML (ref 38–380)
GLUCOSE SERPL-MCNC: 209 MG/DL (ref 65–99)
HBA1C MFR BLD: 8.6 %
HCT VFR BLD AUTO: 43.7 % (ref 38.5–50)
HGB BLD-MCNC: 14.6 G/DL (ref 13.2–17.1)
IRON SATN MFR SERPL: 18 % (CALC) (ref 20–48)
IRON SERPL-MCNC: 67 MCG/DL (ref 50–180)
MCH RBC QN AUTO: 29.6 PG (ref 27–33)
MCHC RBC AUTO-ENTMCNC: 33.4 G/DL (ref 32–36)
MCV RBC AUTO: 88.5 FL (ref 80–100)
PLATELET # BLD AUTO: 252 THOUSAND/UL (ref 140–400)
PMV BLD REES-ECKER: 10 FL (ref 7.5–12.5)
POTASSIUM SERPL-SCNC: 4.1 MMOL/L (ref 3.5–5.3)
RBC # BLD AUTO: 4.94 MILLION/UL (ref 4.2–5.8)
SODIUM SERPL-SCNC: 136 MMOL/L (ref 135–146)
TIBC SERPL-MCNC: 374 MCG/DL (CALC) (ref 250–425)
TSH SERPL-ACNC: 3.06 MIU/L (ref 0.4–4.5)
WBC # BLD AUTO: 8.9 THOUSAND/UL (ref 3.8–10.8)

## 2025-07-23 ENCOUNTER — TELEPHONE (OUTPATIENT)
Dept: CARE COORDINATION | Age: 55
End: 2025-07-23
Payer: COMMERCIAL

## 2025-08-01 ENCOUNTER — HOSPITAL ENCOUNTER (OUTPATIENT)
Dept: RADIOLOGY | Facility: CLINIC | Age: 55
Discharge: HOME | End: 2025-08-01
Payer: COMMERCIAL

## 2025-08-01 ENCOUNTER — APPOINTMENT (OUTPATIENT)
Dept: PHARMACY | Facility: HOSPITAL | Age: 55
End: 2025-08-01
Payer: COMMERCIAL

## 2025-08-01 DIAGNOSIS — J98.4 RESTRICTIVE LUNG DISEASE: ICD-10-CM

## 2025-08-01 DIAGNOSIS — I50.42 CHRONIC COMBINED SYSTOLIC AND DIASTOLIC CONGESTIVE HEART FAILURE: Primary | ICD-10-CM

## 2025-08-01 DIAGNOSIS — R91.1 LUNG NODULE: ICD-10-CM

## 2025-08-01 PROCEDURE — 71250 CT THORAX DX C-: CPT

## 2025-08-01 NOTE — PROGRESS NOTES
"  Pharmacist Clinic: Cardiology Management    Arnaldo Balderrama is a 54 y.o. male was referred to Clinical Pharmacy Team for heart failure management.     Referring Provider: Arnaldo Marie MD    THIS IS A NEW PATIENT APPOINTMENT. PATIENT WILL BE ESTABLISHING CARE WITH CLINICAL PHARMACY.    Appointment was completed by Arnaldo who was reached at [phone].    REVIEW OF PAST APPOINTMENT/INTERVAL HISTORY:  N/a    Allergies Reviewed? Yes    Allergies[1]    Medical History[2]    Medications Ordered Prior to Encounter[3]    RELEVANT LAB RESULTS:  Lab Results   Component Value Date    BILITOT 0.6 04/12/2025    CALCIUM 9.1 07/21/2025    CO2 23 07/21/2025     07/21/2025    CREATININE 1.18 07/21/2025    GLUCOSE 209 (H) 07/21/2025    ALKPHOS 66 04/12/2025    K 4.1 07/21/2025    PROT 6.4 04/12/2025     07/21/2025    AST 24 04/12/2025    ALT 36 04/12/2025    BUN 24 07/21/2025    ANIONGAP 13 07/21/2025    MG 1.23 (L) 03/29/2025    PHOS 4.4 10/21/2024    ALBUMIN 4.0 04/12/2025    AMYLASE 36 06/10/2023    LIPASE 31 05/30/2024    GFRMALE >90 06/18/2022     Lab Results   Component Value Date    TRIG 96 03/30/2025    CHOL 99 03/30/2025    LDLCALC 47 03/30/2025    HDL 32.6 03/30/2025     No results found for: \"BMCBC\", \"CBCDIF\"     PHARMACEUTICAL ASSESSMENT:    MEDICATION RECONCILIATION  Was a medication reconciliation completed at this visit? Yes  Home Pharmacy Reviewed? Yes, describe: [facility] [place]    Added:  - N/a  Changed:  - N/a  Removed:  - N/a    Drug Interactions? No    Medication Documentation Review Audit       Reviewed by Yasmin AntonyD (Pharmacist) on 08/01/25 at 1453      Medication Order Taking? Sig Documenting Provider Last Dose Status   albuterol (Ventolin HFA) 90 mcg/actuation inhaler [id number] Yes Inhale 2 puffs every 4 hours if needed for wheezing or shortness of breath. Jazlyn Blas, APRN-CNP  Active   albuterol 2.5 mg /3 mL (0.083 %) nebulizer solution [id number] Yes " Take 3 mL (2.5 mg) by nebulization 4 times a day as needed for wheezing or shortness of breath. NATHAN Weller  Active   aspirin 81 mg EC tablet [id number] Yes Take 1 tablet (81 mg) by mouth once daily. Historical Provider, MD  Active   atorvastatin (Lipitor) 80 mg tablet [id number] Yes Take 1 tablet (80 mg) by mouth once daily at bedtime. NATHAN Mcrae  Active   blood sugar diagnostic (Blood Glucose Test) strip [id number] Yes Test blood sugar two times daily NATHAN Yousif  Active   budesonide-formoteroL (Symbicort) 160-4.5 mcg/actuation inhaler [id number] Yes Inhale 2 puffs 2 times a day. Rinse mouth with water after use to reduce aftertaste and incidence of candidiasis. Do not swallow. NATHAN Weller  Active   carvedilol (Coreg) 6.25 mg tablet [id number] Yes Take 1 tablet (6.25 mg) by mouth 2 times daily (morning and late afternoon). Arnaldo Marie MD  Active   cholecalciferol (Vitamin D-3) 50 MCG (2000 UT) tablet [id number] Yes Take 1 tablet (2,000 Units) by mouth once daily. NATHAN Mcrae  Active   clopidogrel (Plavix) 75 mg tablet [id number] Yes Take 1 tablet (75 mg) by mouth once daily. NATHAN Mcrae  Active   cyanocobalamin (Vitamin B-12) 250 mcg tablet [id number] Yes Take 1 tablet (250 mcg) by mouth once daily. Historical Provider, MD  Active   eplerenone (Inspra) 25 mg tablet [id number] Yes Take 1 tablet (25 mg) by mouth once daily. Arnaldo Marie MD  Active   ezetimibe (Zetia) 10 mg tablet [id number] Yes Take 1 tablet (10 mg) by mouth once daily at bedtime. NATHAN Mcrae  Active   furosemide (Lasix) 40 mg tablet [id number] Yes Take 1 tablet (40 mg) by mouth 2 times daily (morning and late afternoon). NATHAN Yousif  Active   glipiZIDE (Glucotrol) 5 mg tablet [id number] Yes Take 1 tablet (5 mg) by mouth 2 times a day before meals. Shashank Patel MD  Active   isosorbide mononitrate ER (Imdur) 60  mg 24 hr tablet [id number] Yes Take 1 tablet (60 mg) by mouth once daily at bedtime. Do not crush or chew. PHOENIX Mcrae-CNP  Active   Jardiance 25 mg tablet [id number] Yes Take 1 tablet (25 mg) by mouth once daily. Shashank Patel MD  Active   metFORMIN (Glucophage) 500 mg tablet [id number] Yes TAKE TWO TABLETS BY MOUTH TWO TIMES A DAY WITH MEALS PHOENIX Yousif-CNP  Active   nitroglycerin (Nitrostat) 0.4 mg SL tablet [id number] Yes Place 1 tablet (0.4 mg) under the tongue every 5 minutes if needed for chest pain. Nano Spear MD  Active   pantoprazole (ProtoNix) 20 mg EC tablet [id number] Yes Take 1 tablet (20 mg) by mouth once daily in the morning. Take before meals. Do not crush, chew, or split. PHOENIX Weller-CNP  Active   sacubitriL-valsartan (Entresto) 49-51 mg tablet [id number] Yes Take 1 tablet by mouth 2 times a day. REMEMBER YOU CANNOT TAKE LISINOPRIL AND ENTRESTO WITHIN 2 DAYS OF EACH OTHER Ed Marie MD  Active                  DISEASE MANAGEMENT ASSESSMENT:   CHF ASSESSMENT     Symptom/Staging:  Most recent ejection fraction: 43% (3/2025)  NYHA Class: 2  ACC/AHA Stage: C    Results for orders placed during the hospital encounter of 03/29/25    Transthoracic echo ([place] [address]  Phone [phone]    TRANSTHORACIC ECHOCARDIOGRAM REPORT    Patient Name:       ED Noe Physician:    [id number] Chandrakant Galvez DO  Study Date:         3/29/2025            Ordering Provider:    [id number] BECCA DO  MRN/PID:            [id number]             Fellow:  Accession#:         [id number]         Nurse:  Date of Birth/Age:  1970 / 54 years Sonographer:          Daisha Sheldon RDCS/  Gender Assigned at  M                    Additional Staff:  Birth:  Height:             170.18 cm            Admit Date:  Weight:             103.42 kg            Admission Status:     Inpatient  -  Routine  BSA / BMI:          2.14 m2 / 35.71      Department Location:  [facility]  kg/m2                                      Down  Blood Pressure: 152 /88 mmHg    Study Type:    TRANSTHORACIC ECHO (TTE) LIMITED  Diagnosis/ICD: Dyspnea, unspecified-R06.00  Indication:    CHF, AC DYSPNEA  CPT Codes:     Echo Limited-22828; Doppler Limited-97310; Color Doppler-40977    Patient History:  Diabetes:          Yes  Pertinent History: CHF, Dyspnea, HTN and NSTEMI, S/P CABG, H/O MI.    Study Detail: The following Echo studies were performed: 2D, M-Mode, Doppler and  color flow. Definity used as a contrast agent for endocardial  border definition. Total contrast used for this procedure was 2 mL  via IV push.      PHYSICIAN INTERPRETATION:  Left Ventricle: Left ventricular ejection fraction is mildly decreased, by visual estimate at 40-45%. Wall motion is abnormal. The left ventricular cavity size is moderately dilated. There is normal septal and normal posterior left ventricular wall thickness. Spectral Doppler shows a normal pattern of left ventricular diastolic filling. Global LV hypokinesis with severe inferobasal wall hypokinesis.  Left Atrium: The left atrial size is normal.  Right Ventricle: The right ventricle is normal in size. There is normal right ventricular global systolic function.  Right Atrium: The right atrial size is normal.  Aortic Valve: The aortic valve appears abnormal. There is mild aortic valve cusp calcification. There is mild aortic valve thickening. There is evidence of mildly elevated transaortic gradients consistent with sclerosis of the aortic valve.  There is no evidence of aortic valve regurgitation. The peak instantaneous gradient of the aortic valve is 6 mmHg.  Mitral Valve: The mitral valve is abnormal. There is moderate to severe mitral annular calcification. There is no evidence of mitral valve regurgitation.  Tricuspid Valve: The tricuspid valve is structurally normal. There is  trace to mild tricuspid regurgitation.  Pulmonic Valve: The pulmonic valve is structurally normal. There is no indication of pulmonic valve regurgitation.  Pericardium: No pericardial effusion noted.  Aorta: The aortic root is normal.      CONCLUSIONS:  1. Left ventricular ejection fraction is mildly decreased, by visual estimate at 40-45%.  2. Abnormal wall motion.  3. Left ventricular cavity size is moderately dilated.  4. There is normal right ventricular global systolic function.  5. There is moderate to severe mitral annular calcification.  6. Aortic valve sclerosis.    QUANTITATIVE DATA SUMMARY:    2D MEASUREMENTS:           Normal Ranges:  LAs:             3.60 cm   (2.7-4.0cm)  IVSd:            0.64 cm   (0.6-1.1cm)  LVPWd:           0.69 cm   (0.6-1.1cm)  LVIDd:           4.84 cm   (3.9-5.9cm)  LVIDs:           4.08 cm  LV Mass Index:   47.5 g/m2  LV % FS          15.7 %      M-MODE MEASUREMENTS:         Normal Ranges:  Ao Root:             2.20 cm (2.0-3.7cm)      LV SYSTOLIC FUNCTION:  Normal Ranges:  EF-Visual:      43 %  LV EF Reported: 43 %      LV DIASTOLIC FUNCTION:           Normal Ranges:  MV e'                  0.063 m/s (>8.0)  MV lateral e'          0.06 m/s  MV medial e'           0.06 m/s      AORTIC VALVE:           Normal Ranges:  AoV Vmax:      1.18 m/s (<=1.7m/s)  AoV Peak P.6 mmHg (<20mmHg)  LVOT Max Nadir:  1.00 m/s (<=1.1m/s)  LVOT Diameter: 1.80 cm  (1.8-2.4cm)  AoV Area,Vmax: 2.15 cm2 (2.5-4.5cm2)      RIGHT VENTRICLE:  RV s' 0.06 m/s      TRICUSPID VALVE/RVSP:          Normal Ranges:  Peak TR Velocity:     1.95 m/s  RV Syst Pressure:     18 mmHg  (< 30mmHg)      [id number] Chandrakant Galvez DO  Electronically signed on 3/29/2025 at 11:26:35 AM    ** Final **    CURRENT PHARMACOTHERAPY:   Entresto 49-51 mg BID  Carvedilol 6.25 mg BID  Eplerenone 25 mg daily  Jardiance 25 mg daily  Furosemide 40 mg BID  Isosorbide mononitrate ER 60 mg daily at bedtime    Guideline-Directed Medical  Therapy:  ARNI: Yes, describe: Entresto 49-51 mg BID  Beta Blocker: Yes, describe: carvedilol 6.25 mg BID  MRA: Yes, describe: eplerenone 25 mg daily  SGLT2i: Yes, describe: Jardiance 25 mg daily    Secondary Prevention:  The ASCVD Risk score (Manny BARILLAS, et al., 2019) failed to calculate for the following reasons:    Risk score cannot be calculated because patient has a medical history suggesting prior/existing ASCVD   Aspirin 81mg? yes  Statin?: Yes, describe: atorvastatin 80 mg daily  HTN?: Yes, describe: BP at goal at last appt    Affordability: Reports Jardiance is most expensive; picked up Entresto and did not have a high copay  Adherence/Compliance: Reports adherence; does work a lot  Adverse Effects: Denies    Monitoring Weights at Home: Yes    Past In Office Weight Readings:   Wt Readings from Last 6 Encounters:   07/16/25 101 kg (223 lb)   07/07/25 104 kg (230 lb)   05/21/25 106 kg (234 lb 9.1 oz)   04/30/25 102 kg (224 lb 4.8 oz)   04/23/25 106 kg (232 lb 9.6 oz)   03/31/25 104 kg (229 lb 11.5 oz)     Monitoring Blood Pressure at Home: No    Past In Office BP Readings:   BP Readings from Last 6 Encounters:   07/16/25 110/73   07/07/25 136/77   05/21/25 (!) 174/98   04/30/25 125/63   04/23/25 131/84   03/31/25 135/76     HEALTH MANAGEMENT  Maintaining fluid restriction (<2 L/day): No  Edema/swelling: Yes - stable  Shortness of breath: Yes - stable; believes may have slightly improved since starting eplerenone and Entresto  Trouble sleeping/lying down: No  Myalgias at times  Dry/hacking cough: Yes - sometimes  Recent Hospitalizations: No    EDUCATION/COUNSELING:   Counseled patient on MOA, expectations, duration of therapy, contraindications, administration, and monitoring parameters  Counseled patient on lifestyle modifications that can decrease your risk of having complications (smoking cessation, losing weight, daily weights, vaccines)  Counseled patient on fluid intake and weight management. Recommended  to not consume more than 2 liters of fliuids per day. If they have gained more than 2-3 pounds within a 24 hours period (or 5 pounds in a week), contact their cardiologist  Answered all patient questions and concerns    DISCUSSION/NOTES:   Spoke with Arnaldo today regarding HF management and cost of medications  Patient reports the following HF related symptoms are stable: shortness of breath (improving), edema, and dry cough.  Patient does report lower leg myalgias when laying down, at times. Attributes this to dehydration/on multiple diuretics, however it is possible this is due to atorvastatin; will continue to monitor  Patient reports new start eplerenone, Entresto and increased dose of carvedilol are going well.  Denies any side effects related to medication changes.  Screened for  PAP to help with cost of medications  Patient may qualify, therefore [employer] message sent to patient with instructions.  Will follow-up in 1.5 weeks to discuss next steps    ASSESSMENT:    Assessment/Plan   Problem List Items Addressed This Visit       Chronic combined systolic and diastolic congestive heart failure - Primary    Arnaldo is prescribed 4/4 GDMT. No dosage titrations recommended today. Will continue current regimen unchanged and attempt  PAP for Jardiance and Entresto.          RECOMMENDATIONS/PLAN:    CONTINUE:   Entresto 49-51 mg BID   Carvedilol 6.25 mg BID  Eplerenone 25 mg daily  Jardiance 25 mg daily  Furosemide 40 mg BID  Isosorbide mononitrate ER 60 mg daily at bedtime    Last Appnt with Referring Provider: 7.7.2025  Next Appnt with Referring Provider: 10.17.2025  Clinical Pharmacist follow up: 8/13/2025 @ 12:40 pm (patient's lunch break)  VAF/Application Expiration: No  Type of Encounter: Virtual    Thank you,  Linh Green, PharmD    Verbal consent to manage patient's drug therapy was obtained from the patient . They were informed they may decline to participate or withdraw from participation in pharmacy  services at any time.    Continue all meds under the continuation of care with the referring provider and clinical pharmacy team.       [1] No Known Allergies  [2]   Past Medical History:  Diagnosis Date    Atherosclerosis of native coronary artery of native heart without angina pectoris     CHF (congestive heart failure) 10/2017    Chronic gout without tophus, unspecified cause, unspecified site     Disease of thyroid gland     GERD (gastroesophageal reflux disease)     Heart disease 12/2017    Hyperlipidemia     Hypertension 1/2002    Hypothyroidism, unspecified 12/02/2020    Primary hypothyroidism    Myocardial infarction (Multi) 12/2017    Other fatigue 12/02/2020    Tiredness    Personal history of other diseases of the musculoskeletal system and connective tissue 09/24/2019    History of gout    Personal history of other specified conditions 10/09/2018    History of nausea and vomiting    Type 2 diabetes mellitus with diabetic polyneuropathy, without long-term current use of insulin    [3]   Current Outpatient Medications on File Prior to Visit   Medication Sig Dispense Refill    albuterol (Ventolin HFA) 90 mcg/actuation inhaler Inhale 2 puffs every 4 hours if needed for wheezing or shortness of breath. 8 g 5    albuterol 2.5 mg /3 mL (0.083 %) nebulizer solution Take 3 mL (2.5 mg) by nebulization 4 times a day as needed for wheezing or shortness of breath. 90 mL 2    aspirin 81 mg EC tablet Take 1 tablet (81 mg) by mouth once daily.      atorvastatin (Lipitor) 80 mg tablet Take 1 tablet (80 mg) by mouth once daily at bedtime. 90 tablet 3    blood sugar diagnostic (Blood Glucose Test) strip Test blood sugar two times daily 200 strip 3    budesonide-formoteroL (Symbicort) 160-4.5 mcg/actuation inhaler Inhale 2 puffs 2 times a day. Rinse mouth with water after use to reduce aftertaste and incidence of candidiasis. Do not swallow. 10.2 g 11    carvedilol (Coreg) 6.25 mg tablet Take 1 tablet (6.25 mg) by mouth 2  times daily (morning and late afternoon). 180 tablet 3    cholecalciferol (Vitamin D-3) 50 MCG (2000 UT) tablet Take 1 tablet (2,000 Units) by mouth once daily. 90 tablet 3    clopidogrel (Plavix) 75 mg tablet Take 1 tablet (75 mg) by mouth once daily. 90 tablet 3    cyanocobalamin (Vitamin B-12) 250 mcg tablet Take 1 tablet (250 mcg) by mouth once daily.      eplerenone (Inspra) 25 mg tablet Take 1 tablet (25 mg) by mouth once daily. 90 tablet 3    ezetimibe (Zetia) 10 mg tablet Take 1 tablet (10 mg) by mouth once daily at bedtime. 90 tablet 3    furosemide (Lasix) 40 mg tablet Take 1 tablet (40 mg) by mouth 2 times daily (morning and late afternoon). 180 tablet 3    glipiZIDE (Glucotrol) 5 mg tablet Take 1 tablet (5 mg) by mouth 2 times a day before meals. 60 tablet 0    isosorbide mononitrate ER (Imdur) 60 mg 24 hr tablet Take 1 tablet (60 mg) by mouth once daily at bedtime. Do not crush or chew. 90 tablet 3    Jardiance 25 mg tablet Take 1 tablet (25 mg) by mouth once daily. 30 tablet 11    metFORMIN (Glucophage) 500 mg tablet TAKE TWO TABLETS BY MOUTH TWO TIMES A DAY WITH MEALS 360 tablet 3    nitroglycerin (Nitrostat) 0.4 mg SL tablet Place 1 tablet (0.4 mg) under the tongue every 5 minutes if needed for chest pain. 90 tablet 12    pantoprazole (ProtoNix) 20 mg EC tablet Take 1 tablet (20 mg) by mouth once daily in the morning. Take before meals. Do not crush, chew, or split. 90 tablet 3    sacubitriL-valsartan (Entresto) 49-51 mg tablet Take 1 tablet by mouth 2 times a day. REMEMBER YOU CANNOT TAKE LISINOPRIL AND ENTRESTO WITHIN 2 DAYS OF EACH OTHER 180 tablet 3     No current facility-administered medications on file prior to visit.

## 2025-08-01 NOTE — ASSESSMENT & PLAN NOTE
Arnaldo is prescribed 4/4 GDMT. No dosage titrations recommended today. Will continue current regimen unchanged and attempt  PAP for Jardiance and Entresto.

## 2025-08-05 ENCOUNTER — TELEPHONE (OUTPATIENT)
Dept: PHARMACY | Facility: HOSPITAL | Age: 55
End: 2025-08-05
Payer: COMMERCIAL

## 2025-08-05 NOTE — TELEPHONE ENCOUNTER
Patient Assistance Program Application Status     Arnaldo Balderrama is a 54 y.o. male who was referred to the Clinical Pharmacy Team by Dr. Arnaldo Marie.    We are pleased to inform you that your application for assistance has been approved.    This approval is valid through 8/5/2026 as long as the following criteria continue to be satisfied:     Your medication (Entresto and Jardiance) remains covered under your current insurance plan.   Your prescriber does not discontinue therapy.   You do not seek reimbursement from any other private or government-funded programs for the  medication.    Under this program, the pharmacy will first bill your insurance plan for your specified medication. The [place] [employer] Fund will then offset your copay balance, so that your out-of pocket expense for your medication will be $0.00.     Medication will be filled through [facility], and mailed to the patient. Contacted on the status of the approval. Provided the [facility] phone number ([phone]), and made aware that they will be hearing from someone at [facility] to set up delivery for the prescriptions.     Please reach out to the Clinical Pharmacy Team if there are any further questions.     Follow up with Clinical Pharmacy Team: 8/13/2025    Continue all meds under the continuation of care with the referring provider and clinical pharmacy team.    Verbal consent to manage patient's drug therapy was obtained from patient. They were informed they may decline to participate or withdraw from participation in pharmacy services at any time.

## 2025-08-13 ENCOUNTER — APPOINTMENT (OUTPATIENT)
Dept: PHARMACY | Facility: HOSPITAL | Age: 55
End: 2025-08-13
Payer: COMMERCIAL

## 2025-08-13 DIAGNOSIS — I50.42 CHRONIC COMBINED SYSTOLIC AND DIASTOLIC CONGESTIVE HEART FAILURE: ICD-10-CM

## 2025-08-13 PROCEDURE — RXMED WILLOW AMBULATORY MEDICATION CHARGE

## 2025-08-21 ENCOUNTER — PHARMACY VISIT (OUTPATIENT)
Dept: PHARMACY | Facility: CLINIC | Age: 55
End: 2025-08-21
Payer: COMMERCIAL

## 2025-09-03 ENCOUNTER — APPOINTMENT (OUTPATIENT)
Facility: CLINIC | Age: 55
End: 2025-09-03
Payer: COMMERCIAL

## 2025-10-01 ENCOUNTER — APPOINTMENT (OUTPATIENT)
Dept: PRIMARY CARE | Facility: CLINIC | Age: 55
End: 2025-10-01
Payer: COMMERCIAL

## 2025-12-03 ENCOUNTER — APPOINTMENT (OUTPATIENT)
Dept: PHARMACY | Facility: HOSPITAL | Age: 55
End: 2025-12-03
Payer: COMMERCIAL

## (undated) DEVICE — CATHETER, DIAGNOSTIC, INIFNITI, 6 FR, JR 4.0

## (undated) DEVICE — ANGIO KIT, LEFT HEART, LF, CUSTOM

## (undated) DEVICE — CLOSURE DEVICE, VASCULAR, ANGIO-SEAL, VIP, 6FR, LF

## (undated) DEVICE — MANIFOLD KIT, CUSTOM, GEAUGA

## (undated) DEVICE — Device

## (undated) DEVICE — SHEATH, PINNACLE, 10 CM,  6FR INTRODUCER, 6FR DIA, 2.5 CM DIALATOR

## (undated) DEVICE — CATHETER, ANGIO, IMPULSE, FL4, 6 FR X 100 CM